# Patient Record
Sex: FEMALE | Race: BLACK OR AFRICAN AMERICAN | NOT HISPANIC OR LATINO | Employment: FULL TIME | URBAN - METROPOLITAN AREA
[De-identification: names, ages, dates, MRNs, and addresses within clinical notes are randomized per-mention and may not be internally consistent; named-entity substitution may affect disease eponyms.]

---

## 2017-03-09 PROCEDURE — 99283 EMERGENCY DEPT VISIT LOW MDM: CPT

## 2017-03-09 PROCEDURE — 99283 EMERGENCY DEPT VISIT LOW MDM: CPT | Mod: ,,, | Performed by: EMERGENCY MEDICINE

## 2017-03-10 ENCOUNTER — HOSPITAL ENCOUNTER (EMERGENCY)
Facility: HOSPITAL | Age: 57
Discharge: HOME | End: 2017-03-10
Attending: EMERGENCY MEDICINE
Payer: COMMERCIAL

## 2017-03-10 VITALS
WEIGHT: 184 LBS | OXYGEN SATURATION: 100 % | RESPIRATION RATE: 18 BRPM | HEART RATE: 86 BPM | TEMPERATURE: 99 F | BODY MASS INDEX: 36.12 KG/M2 | HEIGHT: 60 IN | DIASTOLIC BLOOD PRESSURE: 77 MMHG | SYSTOLIC BLOOD PRESSURE: 152 MMHG

## 2017-03-10 DIAGNOSIS — G44.219 EPISODIC TENSION-TYPE HEADACHE, NOT INTRACTABLE: ICD-10-CM

## 2017-03-10 DIAGNOSIS — R03.0 ELEVATED BLOOD PRESSURE READING: Primary | ICD-10-CM

## 2017-03-10 NOTE — DISCHARGE INSTRUCTIONS
Tension Headache    A muscle tension headache is a very common cause of head pain. Its also called a stress headache. When some people are under stress, they tense the muscles of their shoulder, neck, and scalp without knowing it. If this tension lasts long enough, a headache can occur. A tension headache can be quite painful. It can last for hours or even days.  Home care  Follow these tips when caring for yourself at home:  · Dont drive yourself home if you were given pain medicine for your headache. Instead, have someone else drive you home. Try to sleep when you get home. You should feel much better when you wake up.  · Put heat on the back of your neck to help ease neck spasm.  · Drink only clear liquids or eat a light diet until your symptoms get better. This will help you avoid nausea or vomiting.  How to prevent headaches  · Figure out what is causing stress in your life. Learn new ways to handle your stress. Ideas include regular exercise, biofeedback, self-hypnosis, yoga, and meditation. Talk with your healthcare provider to find out more information about managing stress. Many books and digital media are also available on this subject.  · Take time out at the first sign of a tension headache, if possible. Take yourself out of the stressful situation. Find a quiet, comfortable place to sit or lie down and let yourself relax. Heat and deep massage of the tight areas in the neck and shoulders may help ease muscle spasm. You may also get relief from a medicine like ibuprofen or a prescribed muscle relaxant.  Follow-up care  Follow up with your healthcare provider, or as advised. Talk with your provider if you have frequent headaches. He or she can figure out a treatment plan. Ask if you can have medicine to take at home the next time you get a bad headache. This may keep you from having to visit the emergency department in the future. You may need to see a headache specialist (neurologist) if you continue  to have headaches.  When to seek medical advice  Call your healthcare provider right away if any of these occur:  · Your head pain gets worse during sexual intercourse or strenuous activity  · Your head pain doesnt get better within 24 hours  · You arent able to keep liquids down (repeated vomiting)  · Fever of 100.4ºF (38ºC) or higher, or as directed by your healthcare provider  · Stiff neck  · Extreme drowsiness, confusion, or fainting  · Dizziness or dizziness with spinning sensation (vertigo)  · Weakness in an arm or leg or one side of your face  · You have difficulty speaking  · Your vision changes  Date Last Reviewed: 8/1/2016  © 6320-7679 [employer]. [address] [place] [address], [place] [address]. All rights reserved. This information is not intended as a substitute for professional medical care. Always follow your healthcare professional's instructions.

## 2017-03-10 NOTE — ED TRIAGE NOTES
"Pt presents to ed c/o " head pressure" and htn at home. She reports bp at home of 155/122 and 187/87. She denies h/a, n/v/d, dizziness, and fatigue.    "

## 2017-03-10 NOTE — ED AVS SNAPSHOT
[facility]  [address]  [facility] [address]               Jyoti Blue   3/10/2017 12:29 AM   ED    Description:  Female : 1960   Department:  [facility]           Your Care was Coordinated By:     Provider Role From To    Olaf Gonzalez III, MD Attending Provider 03/10/17 0041 --      Reason for Visit     Hypertension           Diagnoses this Visit        Comments    Elevated blood pressure reading    -  Primary     Episodic tension-type headache, not intractable           ED Disposition     None           To Do List           Follow-up Information     Follow up with Hawa Sullivan MD. Schedule an appointment as soon as possible for a visit in 3 days.    Specialty:  Internal Medicine    Contact information:    [address]  [facility] [address]  [phone]        [facility]     [facility] Nurse Care Line -  Assistance  Registered nurses in the [facility] provide clinical advisement, health education, appointment booking, and other advisory services.  Call for this free service at [phone].             Medications           Message regarding Medications     Verify the changes and/or additions to your medication regime listed below are the same as discussed with your clinician today.  If any of these changes or additions are incorrect, please notify your healthcare provider.             Verify that the below list of medications is an accurate representation of the medications you are currently taking.  If none reported, the list may be blank. If incorrect, please contact your healthcare provider. Carry this list with you in case of emergency.           Current Medications     pravastatin (PRAVACHOL) 40 MG tablet Take 1 tablet (40 mg total) by mouth once daily.    tramadol (ULTRAM) 50 mg tablet Take 1 tablet (50 mg total) by mouth daily as needed for Pain.           Clinical Reference  Information           Your Vitals Were     BP Pulse Temp Resp Height Weight    186/80 74 98.2 °F (36.8 °C) (Oral) 18 5' (1.524 m) 83.5 kg (184 lb)    SpO2 BMI             100% 35.94 kg/m2         Allergies as of 3/10/2017     No Known Allergies      Immunizations Administered on Date of Encounter - 3/10/2017     None      ED Micro, Lab, POCT     None      ED Imaging Orders     None        Discharge Instructions         Tension Headache    A muscle tension headache is a very common cause of head pain. Its also called a stress headache. When some people are under stress, they tense the muscles of their shoulder, neck, and scalp without knowing it. If this tension lasts long enough, a headache can occur. A tension headache can be quite painful. It can last for hours or even days.  Home care  Follow these tips when caring for yourself at home:  · Dont drive yourself home if you were given pain medicine for your headache. Instead, have someone else drive you home. Try to sleep when you get home. You should feel much better when you wake up.  · Put heat on the back of your neck to help ease neck spasm.  · Drink only clear liquids or eat a light diet until your symptoms get better. This will help you avoid nausea or vomiting.  How to prevent headaches  · Figure out what is causing stress in your life. Learn new ways to handle your stress. Ideas include regular exercise, biofeedback, self-hypnosis, yoga, and meditation. Talk with your healthcare provider to find out more information about managing stress. Many books and digital media are also available on this subject.  · Take time out at the first sign of a tension headache, if possible. Take yourself out of the stressful situation. Find a quiet, comfortable place to sit or lie down and let yourself relax. Heat and deep massage of the tight areas in the neck and shoulders may help ease muscle spasm. You may also get relief from a medicine like ibuprofen or a prescribed  muscle relaxant.  Follow-up care  Follow up with your healthcare provider, or as advised. Talk with your provider if you have frequent headaches. He or she can figure out a treatment plan. Ask if you can have medicine to take at home the next time you get a bad headache. This may keep you from having to visit the emergency department in the future. You may need to see a headache specialist (neurologist) if you continue to have headaches.  When to seek medical advice  Call your healthcare provider right away if any of these occur:  · Your head pain gets worse during sexual intercourse or strenuous activity  · Your head pain doesnt get better within 24 hours  · You arent able to keep liquids down (repeated vomiting)  · Fever of 100.4ºF (38ºC) or higher, or as directed by your healthcare provider  · Stiff neck  · Extreme drowsiness, confusion, or fainting  · Dizziness or dizziness with spinning sensation (vertigo)  · Weakness in an arm or leg or one side of your face  · You have difficulty speaking  · Your vision changes  Date Last Reviewed: 8/1/2016  © 8544-6971 [employer]. [address] [place] [address]. All rights reserved. This information is not intended as a substitute for professional medical care. Always follow your healthcare professional's instructions.          Discharge References/Attachments     HIGH BLOOD PRESSURE (HYPERTENSION), DISCHARGE INSTRUCTIONS (ENGLISH)      [facility] Sign-Up     Activating your [facility] account is as easy as 1-2-3!     1) Visit [email or web address], select Sign Up Now, enter this activation code and your date of birth, then select Next.  [email or web address]  Expires: 4/24/2017 12:57 AM      2) Create a username and password to use when you visit [facility] in the future and select a security question in case you lose your password and select Next.    3) Enter your e-mail address and click Sign Up!    Additional Information  If you have questions, please  e-mail [email or web address] or call [phone] to talk to our [facility] staff. Remember, [facility] is NOT to be used for urgent needs. For medical emergencies, dial 911.          [facility] complies with applicable Federal civil rights laws and does not discriminate on the basis of race, color, national origin, age, disability, or sex.        Language Assistance Services     ATTENTION: Language assistance services are available, free of charge. Please call [phone].      ATENCIÓN: Si habla español, tiene a easley disposición servicios gratuitos de asistencia lingüística. Llame al [phone].     CHÚ Ý: N?u b?n nói Ti?ng Vi?t, có các d?ch v? h? tr? ngôn ng? mi?n phí dành cho b?n. G?i s? [phone].

## 2017-03-10 NOTE — ED PROVIDER NOTES
"Encounter Date: 3/9/2017    SCRIBE #1 NOTE: I, Karolinatessa Yanez, am scribing for, and in the presence of, Dr. Gonzalez.       History     Chief Complaint   Patient presents with    Hypertension     pt states taking BP at home and there were 3 numbers on top and bottom, denies HTN, CP, SOB     Review of patient's allergies indicates:  No Known Allergies  HPI Comments: Time seen by provider: 12:49 AM    This is a 56 y.o. female with a PMHx of HLD, anemia, and allergy and a PSHx of breast surgery and ovary surgery who presents with complaint of hypertension with a mild frontal headache earlier today (now resolved). She states she recorded her blood pressure to be around 155/122, whereas usually it is around 120/88. On arrival to ED, patient's blood pressure was 186/80. She is not on blood pressure medication. Patient notes she ate crawfish and gumbo recently. Patient denies visual changes, chest pain, shortness of breath, extremity weakness, dysuria. She additionally denies a history of HTN.    The history is provided by the patient.     Past Medical History:   Diagnosis Date    Allergy     Anemia     Hyperlipidemia      Past Surgical History:   Procedure Laterality Date    BREAST SURGERY      DILATION AND CURETTAGE OF UTERUS      ovary removed      OVARY SURGERY       Family History   Problem Relation Age of Onset    Stroke Mother     Heart disease Father     Acne Neg Hx     Eczema Neg Hx     Psoriasis Neg Hx      Social History   Substance Use Topics    Smoking status: Former Smoker    Smokeless tobacco: Never Used      Comment: quit smoking in 1980s    Alcohol use 0.0 oz/week     0 Standard drinks or equivalent per week      Comment: "every now and then"     Review of Systems   Eyes: Negative for visual disturbance.   Respiratory: Negative for shortness of breath.    Cardiovascular: Negative for chest pain.   Genitourinary: Negative for dysuria.   Neurological: Positive for headaches (resolved). Negative " for weakness.       Physical Exam   Initial Vitals   BP Pulse Resp Temp SpO2   03/09/17 2051 03/09/17 2051 03/09/17 2051 03/09/17 2051 03/09/17 2051   186/80 74 18 98.2 °F (36.8 °C) 100 %     Physical Exam    Nursing note and vitals reviewed.  Constitutional: She appears well-developed and well-nourished.   Cardiovascular: Normal rate and regular rhythm. Exam reveals no gallop and no friction rub.    No murmur heard.  Pulmonary/Chest: Breath sounds normal. No respiratory distress. She has no wheezes. She has no rhonchi. She has no rales.   Abdominal: Soft. She exhibits no distension. There is no tenderness. There is no rebound and no guarding.   Neurological: She is alert and oriented to person, place, and time. She has normal strength. No cranial nerve deficit or sensory deficit.         ED Course   Procedures  Labs Reviewed - No data to display          Medical Decision Making:   History:   Old Medical Records: I decided to obtain old medical records.  Initial Assessment:   Patient with a very mild frontal headache that sounds like a tension headache that has completely resolved. Patient found to be hypertensive at home. Her usual blood pressures are completely normal. I will discharge home with no intervention and monitoring of blood pressure. She will follow up with her primary care.                    ED Course     Clinical Impression:   The primary encounter diagnosis was Elevated blood pressure reading. A diagnosis of Episodic tension-type headache, not intractable was also pertinent to this visit.    Disposition:   Disposition: Discharged  Condition: Stable       Olaf Gonzalez III, MD  03/10/17 0149

## 2017-04-24 ENCOUNTER — LAB VISIT (OUTPATIENT)
Dept: LAB | Facility: HOSPITAL | Age: 57
End: 2017-04-24
Attending: FAMILY MEDICINE
Payer: COMMERCIAL

## 2017-04-24 ENCOUNTER — TELEPHONE (OUTPATIENT)
Dept: INTERNAL MEDICINE | Facility: CLINIC | Age: 57
End: 2017-04-24

## 2017-04-24 ENCOUNTER — OFFICE VISIT (OUTPATIENT)
Dept: INTERNAL MEDICINE | Facility: CLINIC | Age: 57
End: 2017-04-24
Payer: COMMERCIAL

## 2017-04-24 VITALS
DIASTOLIC BLOOD PRESSURE: 66 MMHG | BODY MASS INDEX: 37.44 KG/M2 | WEIGHT: 190.69 LBS | HEIGHT: 60 IN | OXYGEN SATURATION: 99 % | HEART RATE: 61 BPM | SYSTOLIC BLOOD PRESSURE: 124 MMHG

## 2017-04-24 DIAGNOSIS — M17.11 OSTEOARTHRITIS OF RIGHT KNEE, UNSPECIFIED OSTEOARTHRITIS TYPE: ICD-10-CM

## 2017-04-24 DIAGNOSIS — R73.03 PREDIABETES: ICD-10-CM

## 2017-04-24 DIAGNOSIS — Z13.21 ENCOUNTER FOR VITAMIN DEFICIENCY SCREENING: ICD-10-CM

## 2017-04-24 DIAGNOSIS — Z00.00 ANNUAL PHYSICAL EXAM: Primary | ICD-10-CM

## 2017-04-24 DIAGNOSIS — H61.23 BILATERAL IMPACTED CERUMEN: ICD-10-CM

## 2017-04-24 DIAGNOSIS — E78.5 HYPERLIPIDEMIA, UNSPECIFIED HYPERLIPIDEMIA TYPE: ICD-10-CM

## 2017-04-24 DIAGNOSIS — Z00.00 ANNUAL PHYSICAL EXAM: ICD-10-CM

## 2017-04-24 LAB
25(OH)D3+25(OH)D2 SERPL-MCNC: 12 NG/ML
ALBUMIN SERPL BCP-MCNC: 3.8 G/DL
ALP SERPL-CCNC: 87 U/L
ALT SERPL W/O P-5'-P-CCNC: 16 U/L
ANION GAP SERPL CALC-SCNC: 10 MMOL/L
AST SERPL-CCNC: 17 U/L
BASOPHILS # BLD AUTO: 0.02 K/UL
BASOPHILS NFR BLD: 0.5 %
BILIRUB SERPL-MCNC: 0.3 MG/DL
BUN SERPL-MCNC: 14 MG/DL
CALCIUM SERPL-MCNC: 9.6 MG/DL
CHLORIDE SERPL-SCNC: 107 MMOL/L
CHOLEST/HDLC SERPL: 3.7 {RATIO}
CO2 SERPL-SCNC: 24 MMOL/L
CREAT SERPL-MCNC: 0.7 MG/DL
DIFFERENTIAL METHOD: ABNORMAL
EOSINOPHIL # BLD AUTO: 0.1 K/UL
EOSINOPHIL NFR BLD: 1.4 %
ERYTHROCYTE [DISTWIDTH] IN BLOOD BY AUTOMATED COUNT: 15.2 %
EST. GFR  (AFRICAN AMERICAN): >60 ML/MIN/1.73 M^2
EST. GFR  (NON AFRICAN AMERICAN): >60 ML/MIN/1.73 M^2
GLUCOSE SERPL-MCNC: 94 MG/DL
HCT VFR BLD AUTO: 40.2 %
HDL/CHOLESTEROL RATIO: 26.9 %
HDLC SERPL-MCNC: 227 MG/DL
HDLC SERPL-MCNC: 61 MG/DL
HGB BLD-MCNC: 13.1 G/DL
LDLC SERPL CALC-MCNC: 154.4 MG/DL
LYMPHOCYTES # BLD AUTO: 1.5 K/UL
LYMPHOCYTES NFR BLD: 34.5 %
MCH RBC QN AUTO: 28.3 PG
MCHC RBC AUTO-ENTMCNC: 32.6 %
MCV RBC AUTO: 87 FL
MONOCYTES # BLD AUTO: 0.3 K/UL
MONOCYTES NFR BLD: 7.9 %
NEUTROPHILS # BLD AUTO: 2.4 K/UL
NEUTROPHILS NFR BLD: 55.7 %
NONHDLC SERPL-MCNC: 166 MG/DL
PLATELET # BLD AUTO: 273 K/UL
PMV BLD AUTO: 10.6 FL
POTASSIUM SERPL-SCNC: 4.6 MMOL/L
PROT SERPL-MCNC: 7.7 G/DL
RBC # BLD AUTO: 4.63 M/UL
SODIUM SERPL-SCNC: 141 MMOL/L
TRIGL SERPL-MCNC: 58 MG/DL
TSH SERPL DL<=0.005 MIU/L-ACNC: 0.97 UIU/ML
WBC # BLD AUTO: 4.32 K/UL

## 2017-04-24 PROCEDURE — 82306 VITAMIN D 25 HYDROXY: CPT

## 2017-04-24 PROCEDURE — 84443 ASSAY THYROID STIM HORMONE: CPT

## 2017-04-24 PROCEDURE — 80053 COMPREHEN METABOLIC PANEL: CPT

## 2017-04-24 PROCEDURE — 36415 COLL VENOUS BLD VENIPUNCTURE: CPT

## 2017-04-24 PROCEDURE — 83036 HEMOGLOBIN GLYCOSYLATED A1C: CPT

## 2017-04-24 PROCEDURE — 80061 LIPID PANEL: CPT

## 2017-04-24 PROCEDURE — 85025 COMPLETE CBC W/AUTO DIFF WBC: CPT

## 2017-04-24 PROCEDURE — 99396 PREV VISIT EST AGE 40-64: CPT | Mod: S$GLB,,, | Performed by: FAMILY MEDICINE

## 2017-04-24 PROCEDURE — 99999 PR PBB SHADOW E&M-EST. PATIENT-LVL IV: CPT | Mod: PBBFAC,,, | Performed by: FAMILY MEDICINE

## 2017-04-24 RX ORDER — CYCLOBENZAPRINE HCL 10 MG
TABLET ORAL
COMMUNITY
Start: 2017-01-21 | End: 2020-08-12 | Stop reason: SDUPTHER

## 2017-04-24 RX ORDER — MELOXICAM 7.5 MG/1
7.5 TABLET ORAL DAILY
Qty: 30 TABLET | Refills: 0 | Status: SHIPPED | OUTPATIENT
Start: 2017-04-24 | End: 2017-04-24 | Stop reason: SDUPTHER

## 2017-04-24 RX ORDER — PRAVASTATIN SODIUM 40 MG/1
40 TABLET ORAL DAILY
Qty: 90 TABLET | Refills: 3 | Status: SHIPPED | OUTPATIENT
Start: 2017-04-24 | End: 2018-06-12 | Stop reason: SDUPTHER

## 2017-04-24 RX ORDER — MELOXICAM 7.5 MG/1
TABLET ORAL
Qty: 90 TABLET | Refills: 0 | Status: SHIPPED | OUTPATIENT
Start: 2017-04-24 | End: 2019-09-03

## 2017-04-24 NOTE — MR AVS SNAPSHOT
[facility]  [address]  [place] [address]  Phone: [phone]  Fax: [phone]                  Jyoti Blue   2017 11:30 AM   Office Visit    Description:  Female : 1960   Provider:  Francie Yip MD   Department:  [facility]           Reason for Visit     Establish Care     Annual Exam           Diagnoses this Visit        Comments    Annual physical exam    -  Primary     Hyperlipidemia, unspecified hyperlipidemia type         Prediabetes         Encounter for vitamin deficiency screening         Osteoarthritis of right knee, unspecified osteoarthritis type         Bilateral impacted cerumen                To Do List           Goals (5 Years of Data)              Today    3/10/17    3/9/17    Blood Pressure < 140/90   124/66  124/66  124/66      Follow-Up and Disposition     Return in about 6 months (around 10/24/2017), or if symptoms worsen or fail to improve.       These Medications        Disp Refills Start End    pravastatin (PRAVACHOL) 40 MG tablet 90 tablet 3 2017     Take 1 tablet (40 mg total) by mouth once daily. - Oral    Pharmacy: [facility] [address] [place] [address] [place] [facility] Ph #: [phone]       Notes to Pharmacy: **Patient requests 90 days supply**    meloxicam (MOBIC) 7.5 MG tablet 30 tablet 0 2017     Take 1 tablet (7.5 mg total) by mouth once daily. - Oral    Ph[facility] [address] [facility] Ph #: [phone]         [facility]     [facility] Call Nurse Care Line -  Assistance  Unless otherwise directed by your provider, please contact [facility], our nurse care line that is available for  assistance.     Registered nurses in the [facility] provide: appointment scheduling, clinical advisement, health education, and other advisory  services.  Call: [phone] (toll free)               Medications           Message regarding Medications     Verify the changes and/or additions to your medication regime listed below are the same as discussed with your clinician today.  If any of these changes or additions are incorrect, please notify your healthcare provider.        START taking these NEW medications        Refills    meloxicam (MOBIC) 7.5 MG tablet 0    Sig: Take 1 tablet (7.5 mg total) by mouth once daily.    Class: Normal    Route: Oral      STOP taking these medications     tramadol (ULTRAM) 50 mg tablet Take 1 tablet (50 mg total) by mouth daily as needed for Pain.           Verify that the below list of medications is an accurate representation of the medications you are currently taking.  If none reported, the list may be blank. If incorrect, please contact your healthcare provider. Carry this list with you in case of emergency.           Current Medications     pravastatin (PRAVACHOL) 40 MG tablet Take 1 tablet (40 mg total) by mouth once daily.    cyclobenzaprine (FLEXERIL) 10 MG tablet     meloxicam (MOBIC) 7.5 MG tablet Take 1 tablet (7.5 mg total) by mouth once daily.           Clinical Reference Information           Your Vitals Were     BP Pulse Height Weight SpO2 BMI    124/66 61 5' (1.524 m) 86.5 kg (190 lb 11.2 oz) 99% 37.24 kg/m2      Blood Pressure          Most Recent Value    BP  124/66      Allergies as of 4/24/2017     No Known Allergies      Immunizations Administered on Date of Encounter - 4/24/2017     None      Orders Placed During Today's Visit      Normal Orders This Visit    Ambulatory Referral to ENT     Ambulatory referral to Nutrition Services     Ambulatory Referral to Optometry     Ambulatory Referral to Orthopedics     Future Labs/Procedures Expected by Expires    CBC auto differential  4/24/2017 6/23/2018    Comprehensive metabolic panel  4/24/2017 6/23/2018    Lipid panel  4/24/2017 6/23/2018    TSH   4/24/2017 6/23/2018    Vitamin D  4/24/2017 6/23/2018    Hemoglobin A1c  3/25/2018 (Approximate) 6/23/2018      [facility] Sign-Up     Activating your [facility] account is as easy as 1-2-3!     1) Visit [email or web address], select Sign Up Now, enter this activation code and your date of birth, then select Next.  [email or web address]  Expires: 6/8/2017 12:33 PM      2) Create a username and password to use when you visit [facility] in the future and select a security question in case you lose your password and select Next.    3) Enter your e-mail address and click Sign Up!    Additional Information  If you have questions, please e-mail [email or web address] or call [phone] to talk to our [facility] staff. Remember, [facility] is NOT to be used for urgent needs. For medical emergencies, dial 911.         Language Assistance Services     ATTENTION: Language assistance services are available, free of charge. Please call [phone].      ATENCIÓN: Si habla español, tiene a easley disposición servicios gratuitos de asistencia lingüística. Llame al [phone].     SAÚL Ý: N?u b?n nói Ti?ng Vi?t, có các d?ch v? h? tr? ngôn ng? mi?n phí dành cho b?n. G?i s? [phone].         [facility] complies with applicable Federal civil rights laws and does not discriminate on the basis of race, color, national origin, age, disability, or sex.

## 2017-04-24 NOTE — LETTER
April 27, 2017    Jyoti Kaylie Mirza [address]  [place] [address]             [facility]  [address]  [place] [address]  Phone: [phone]  Fax: [phone] Dear Mrs Blue:    Please give us a call at the office at your convenience. We have been unable to reach you with your results.     If you have any questions or concerns, please don't hesitate to call.    Sincerely,        Kristie Peterson

## 2017-04-24 NOTE — PROGRESS NOTES
Subjective:       Patient ID: Jyoti Blue is a 56 y.o. female.    Chief Complaint: Establish Care and Annual Exam    HPI 57 y/o female with HLPD, OA of R knee, is here to establish care.  She is doing well, denies she is not doing any dedicated exercise, she plans to restart walking, she does have a seated bike in storage.  Denies f/n/v, occasional indigestion takes prn Tums, denies d/constipation/cp/sob/urinary sx.  GYN: followed by Dr. Quita Bess, mmg and pelvic utd and normal  R knee pain: started hurting about 2014, has mild DJD on previous x-ray, she had one injection in the past, not done PT, she had been given tramadol in the past and was taking it 1-2 times a month, she reports she takes Ibuprofen or Aleve for mild flare ups, takes about 2 pills about 2-3 times a month, the pain in her knee is in the front, cannot describe the pain  OTC: aleve or ibuprofen  Colonoscopy done in 2011 was normal  Eye exam due  Dental utd  Vaccines: tdap due    Review of Systems   Constitutional: Negative for activity change, appetite change, fatigue and fever.   Respiratory: Negative for cough and shortness of breath.    Cardiovascular: Negative for chest pain, palpitations and leg swelling.   Gastrointestinal: Negative for constipation, diarrhea, nausea and vomiting.   Genitourinary: Negative for difficulty urinating and dysuria.   Skin: Negative for rash.   Neurological: Negative for dizziness and light-headedness.   Psychiatric/Behavioral: Negative for sleep disturbance.       Objective:      /66  Pulse 61  Ht 5' (1.524 m)  Wt 86.5 kg (190 lb 11.2 oz)  SpO2 99%  BMI 37.24 kg/m2  Physical Exam   Constitutional: She appears well-developed and well-nourished.   HENT:   Head: Normocephalic and atraumatic.   Mouth/Throat: No oropharyngeal exudate.   Neck: Normal range of motion. Neck supple. No thyromegaly present.   Cardiovascular: Normal rate, regular rhythm and normal heart sounds.    Pulmonary/Chest:  Effort normal and breath sounds normal. No respiratory distress.   Abdominal: Soft. Bowel sounds are normal. She exhibits distension.   Musculoskeletal: Normal range of motion. She exhibits no edema, tenderness or deformity.   Crepitus bilateral knees   Lymphadenopathy:     She has no cervical adenopathy.   Neurological: She is alert.   Skin: Skin is warm and dry.   Psychiatric: She has a normal mood and affect.   Nursing note and vitals reviewed.      Assessment:       1. Annual physical exam    2. Hyperlipidemia, unspecified hyperlipidemia type    3. Prediabetes    4. Encounter for vitamin deficiency screening    5. Osteoarthritis of right knee, unspecified osteoarthritis type    6. Bilateral impacted cerumen        Plan:   Jyoti was seen today for establish care and annual exam.    Diagnoses and all orders for this visit:    Annual physical exam  -     TSH; Future    Hyperlipidemia, unspecified hyperlipidemia type  -     pravastatin (PRAVACHOL) 40 MG tablet; Take 1 tablet (40 mg total) by mouth once daily.  -     CBC auto differential; Future  -     Comprehensive metabolic panel; Future  -     Lipid panel; Future  -     Ambulatory Referral to Optometry    Prediabetes  -     Ambulatory referral to Nutrition Services  -     Hemoglobin A1c; Future  -     Ambulatory Referral to Optometry    Encounter for vitamin deficiency screening  -     Vitamin D; Future    Osteoarthritis of right knee, unspecified osteoarthritis type  -     Ambulatory Referral to Orthopedics  -     meloxicam (MOBIC) 7.5 MG tablet; Take 1 tablet (7.5 mg total) by mouth once daily.    Bilateral impacted cerumen  -     Ambulatory Referral to ENT

## 2017-04-24 NOTE — LETTER
04/24/2017                 [facility]  [address]  [facility] [address]  Phone: [phone]  Fax: [phone]   04/24/2017    Patient: Jyoti Blue   YOB: 1960   Date of Visit: 4/24/2017       To Whom it May Concern:    Jyoti Blue was seen in my clinic on 4/24/2017, she completed labs and vaccines as well.     If you have any questions or concerns, please don't hesitate to call.    Sincerely,         Francie Yip MD

## 2017-04-25 LAB
ESTIMATED AVG GLUCOSE: 134 MG/DL
HBA1C MFR BLD HPLC: 6.3 %

## 2017-04-25 NOTE — TELEPHONE ENCOUNTER
Please let her know that her blood sugar is still in the prediabetic range.  At this point we could start a medication called metformin to prevent diabetes.  Also, I encouraged her to attend are prediabetes education class.  Her vitamin D level is deficient, I recommend over-the-counter vit D3 2000 international units daily.  Please verify if she has been taking her Pravachol daily, her cholesterol has increased a little from last year.  Find out if she had tried any other cholesterol meds.

## 2017-05-05 ENCOUNTER — HOSPITAL ENCOUNTER (OUTPATIENT)
Dept: RADIOLOGY | Facility: HOSPITAL | Age: 57
Discharge: HOME | End: 2017-05-05
Attending: NURSE PRACTITIONER
Payer: COMMERCIAL

## 2017-05-05 ENCOUNTER — NUTRITION (OUTPATIENT)
Dept: NUTRITION | Facility: CLINIC | Age: 57
End: 2017-05-05
Payer: COMMERCIAL

## 2017-05-05 ENCOUNTER — OFFICE VISIT (OUTPATIENT)
Dept: ORTHOPEDICS | Facility: CLINIC | Age: 57
End: 2017-05-05
Payer: COMMERCIAL

## 2017-05-05 ENCOUNTER — OFFICE VISIT (OUTPATIENT)
Dept: OPTOMETRY | Facility: CLINIC | Age: 57
End: 2017-05-05
Payer: COMMERCIAL

## 2017-05-05 VITALS — HEIGHT: 60 IN | WEIGHT: 192 LBS | BODY MASS INDEX: 37.69 KG/M2

## 2017-05-05 VITALS — HEIGHT: 60 IN | BODY MASS INDEX: 37.69 KG/M2 | WEIGHT: 192 LBS

## 2017-05-05 DIAGNOSIS — M25.561 CHRONIC PAIN OF RIGHT KNEE: Primary | ICD-10-CM

## 2017-05-05 DIAGNOSIS — M17.11 PRIMARY OSTEOARTHRITIS OF RIGHT KNEE: ICD-10-CM

## 2017-05-05 DIAGNOSIS — H52.4 BILATERAL PRESBYOPIA: ICD-10-CM

## 2017-05-05 DIAGNOSIS — R73.03 PREDIABETES: Primary | ICD-10-CM

## 2017-05-05 DIAGNOSIS — Z71.3 DIETARY COUNSELING: ICD-10-CM

## 2017-05-05 DIAGNOSIS — E78.5 HYPERLIPIDEMIA, UNSPECIFIED HYPERLIPIDEMIA TYPE: ICD-10-CM

## 2017-05-05 DIAGNOSIS — H26.9 CORTICAL CATARACT OF BOTH EYES: ICD-10-CM

## 2017-05-05 DIAGNOSIS — G89.29 CHRONIC PAIN OF RIGHT KNEE: Primary | ICD-10-CM

## 2017-05-05 DIAGNOSIS — M25.561 CHRONIC PAIN OF RIGHT KNEE: ICD-10-CM

## 2017-05-05 DIAGNOSIS — G89.29 CHRONIC PAIN OF RIGHT KNEE: ICD-10-CM

## 2017-05-05 DIAGNOSIS — E66.9 OBESITY (BMI 30-39.9): ICD-10-CM

## 2017-05-05 PROCEDURE — 1160F RVW MEDS BY RX/DR IN RCRD: CPT | Mod: S$GLB,,, | Performed by: NURSE PRACTITIONER

## 2017-05-05 PROCEDURE — 73564 X-RAY EXAM KNEE 4 OR MORE: CPT | Mod: TC,RT

## 2017-05-05 PROCEDURE — 73562 X-RAY EXAM OF KNEE 3: CPT | Mod: 26,59,LT, | Performed by: RADIOLOGY

## 2017-05-05 PROCEDURE — 99999 PR PBB SHADOW E&M-EST. PATIENT-LVL III: CPT | Mod: PBBFAC,,, | Performed by: NURSE PRACTITIONER

## 2017-05-05 PROCEDURE — 99999 PR PBB SHADOW E&M-EST. PATIENT-LVL II: CPT | Mod: 25,PBBFAC,, | Performed by: OPTOMETRIST

## 2017-05-05 PROCEDURE — 97802 MEDICAL NUTRITION INDIV IN: CPT | Mod: S$GLB,,, | Performed by: DIETITIAN, REGISTERED

## 2017-05-05 PROCEDURE — 73564 X-RAY EXAM KNEE 4 OR MORE: CPT | Mod: 26,RT,, | Performed by: RADIOLOGY

## 2017-05-05 PROCEDURE — 92015 DETERMINE REFRACTIVE STATE: CPT | Mod: S$GLB,,, | Performed by: OPTOMETRIST

## 2017-05-05 PROCEDURE — 92004 COMPRE OPH EXAM NEW PT 1/>: CPT | Mod: S$GLB,,, | Performed by: OPTOMETRIST

## 2017-05-05 PROCEDURE — 99213 OFFICE O/P EST LOW 20 MIN: CPT | Mod: S$GLB,,, | Performed by: NURSE PRACTITIONER

## 2017-05-05 PROCEDURE — 99999 PR PBB SHADOW E&M-EST. PATIENT-LVL II: CPT | Mod: PBBFAC,,,

## 2017-05-05 RX ORDER — HYALURONATE SODIUM 20 MG/2 ML
20 SYRINGE (ML) INTRAARTICULAR WEEKLY
Status: COMPLETED | OUTPATIENT
Start: 2017-05-15 | End: 2017-05-29

## 2017-05-05 NOTE — PATIENT INSTRUCTIONS
CATARACT    Symptoms and Signs:  A cataract starts out small, and at first has little effect on your vision. You may notice that your vision is blurred a little, like looking through a cloudy piece of glass or viewing an impressionist painting. A cataract may make light from the sun or a lamp seem too bright or glaring. Or you may notice when you drive at night that the oncoming headlights cause more glare than before. Colors may not appear as bright as they once did.  The type of cataract you have will affect exactly which symptoms you experience and how soon they will occur. When a nuclear cataract first develops it can bring about a temporary improvement in your near vision, called second sight. Unfortunately, the improved vision is short-lived and will disappear as the cataract worsens. Meanwhile, a sub-capsular cataract may not produce any symptoms until it's well-developed.    Causes:  No one knows for sure why the eye's lens changes as we age, forming cataracts. Researchers are gradually identifying factors that may cause cataracts - and information that may help to prevent them.  Many studies suggest that exposure to ultraviolet light is associated with cataracts, so eye care practitioners recommend wearing sunglasses and a wide-brimmed hat to lessen your exposure.  Other studies suggest people with diabetes are at risk for developing a cataract.   Some eye care practitioners believe that a diet high in antioxidants, such as beta-carotene (vitamin A), selenium and vitamins C and E, may forestall cataracts.  The most important of these is probably vitamin C; it might be helpful to supplement the diet with an extra Vitamin C tablet.  Meanwhile, eating a lot of salt may increase your risk.  Other risk factors include cigarette smoke, air pollution and heavy alcohol consumption.  We simply recommend that you be careful to use sunglasses and to take Vitamin C.    Treatment:  When symptoms begin to appear, we can  improve your vision for a while using new glasses, strong bifocals, magnification, appropriate lighting or other visual aids.  This is true in your case; your cataract does not impact your vision very much at this time. If you experience any of the symptoms we described you can return at any time. Otherwise it is fine to see you in 1 year.    Amanda Brower, OD

## 2017-05-05 NOTE — LETTER
May 5, 2017      Francie Yip MD  [address]  [place] [address]           [facility]  [address]  [facility] [address]  Phone: [phone]          Patient: Jyoti Blue   MR Number: [id number]   YOB: 1960   Date of Visit: 5/5/2017       Dear Dr. Francie Yip:    Thank you for referring Jyoti Blue to me for evaluation. Attached you will find relevant portions of my assessment and plan of care.    If you have questions, please do not hesitate to call me. I look forward to following Jyoti Blue along with you.    Sincerely,    Kesha Hendrix, NP    Enclosure  CC:  No Recipients    If you would like to receive this communication electronically, please contact [email or web address] or [phone] to request more information on [employer] Link access.    For providers and/or their staff who would like to refer a patient to [facility], please contact us through our one-stop-shop provider referral line, [facility], at [phone].    If you feel you have received this communication in error or would no longer like to receive these types of communications, please e-mail [email or web address]

## 2017-05-05 NOTE — PATIENT INSTRUCTIONS
1200 calorie, low fat, low cholesterol, high fiber diet.  Exercise 30 minutes per day, 3-5 days per week.

## 2017-05-05 NOTE — MR AVS SNAPSHOT
[facility]  [address]  [facility] [address]  Phone: [phone]                  Jyoti Blue   2017 8:40 AM   Office Visit    Description:  Female : 1960   Provider:  Amanda Brower OD   Department:  [facility]           Reason for Visit     Diabetic Eye Exam                To Do List           Future Appointments        Provider Department Dept Phone    2017 9:30 AM DIETITIAN, GENERAL INT MED [facility] Nutrition [phone]    2017 11:00 AM Kesha Hendrix NP [facility] Orthopedics [phone]    2017 1:00 PM AUDIOGRAM, AUDIO [facility] Audiology [phone]    2017 2:15 PM Donald May III, MD [facility] Otorhinolaryngology [phone]      Goals (5 Years of Data)              4/24/17    3/10/17    3/9/17    Blood Pressure < 140/90   124/66  124/66  124/66      Follow-Up and Disposition     Return in about 1 year (around 2018) for annual eye examination.      [facility]     [facility] On Call Nurse Care Line -  Assistance  Unless otherwise directed by your provider, please contact [facility], our nurse care line that is available for  assistance.     Registered nurses in the [facility] On Call Center provide: appointment scheduling, clinical advisement, health education, and other advisory services.  Call: [phone] (toll free)               Medications           Message regarding Medications     Verify the changes and/or additions to your medication regime listed below are the same as discussed with your clinician today.  If any of these changes or additions are incorrect, please notify your healthcare provider.             Verify that the below list of medications is an accurate representation of the medications you are currently taking.  If none reported, the list may be blank. If incorrect, please contact your healthcare provider. Carry this list with you in case of emergency.            Current Medications     cyclobenzaprine (FLEXERIL) 10 MG tablet     meloxicam (MOBIC) 7.5 MG tablet TAKE 1 TABLET(7.5 MG) BY MOUTH EVERY DAY    pravastatin (PRAVACHOL) 40 MG tablet Take 1 tablet (40 mg total) by mouth once daily.           Clinical Reference Information           Allergies as of 5/5/2017     No Known Allergies      Immunizations Administered on Date of Encounter - 5/5/2017     None      [facility] Sign-Up     Activating your [facility] account is as easy as 1-2-3!     1) Visit [email or web address], select Sign Up Now, enter this activation code and your date of birth, then select Next.  [email or web address]  Expires: 6/8/2017 12:33 PM      2) Create a username and password to use when you visit [facility] in the future and select a security question in case you lose your password and select Next.    3) Enter your e-mail address and click Sign Up!    Additional Information  If you have questions, please e-mail [email or web address] or call [phone] to talk to our [facility] staff. Remember, [facility] is NOT to be used for urgent needs. For medical emergencies, dial 911.         Instructions    CATARACT    Symptoms and Signs:  A cataract starts out small, and at first has little effect on your vision. You may notice that your vision is blurred a little, like looking through a cloudy piece of glass or viewing an impressionist painting. A cataract may make light from the sun or a lamp seem too bright or glaring. Or you may notice when you drive at night that the oncoming headlights cause more glare than before. Colors may not appear as bright as they once did.  The type of cataract you have will affect exactly which symptoms you experience and how soon they will occur. When a nuclear cataract first develops it can bring about a temporary improvement in your near vision, called second sight. Unfortunately, the improved vision is short-lived and will disappear as the cataract worsens. Meanwhile, a  sub-capsular cataract may not produce any symptoms until it's well-developed.    Causes:  No one knows for sure why the eye's lens changes as we age, forming cataracts. Researchers are gradually identifying factors that may cause cataracts - and information that may help to prevent them.  Many studies suggest that exposure to ultraviolet light is associated with cataracts, so eye care practitioners recommend wearing sunglasses and a wide-brimmed hat to lessen your exposure.  Other studies suggest people with diabetes are at risk for developing a cataract.   Some eye care practitioners believe that a diet high in antioxidants, such as beta-carotene (vitamin A), selenium and vitamins C and E, may forestall cataracts.  The most important of these is probably vitamin C; it might be helpful to supplement the diet with an extra Vitamin C tablet.  Meanwhile, eating a lot of salt may increase your risk.  Other risk factors include cigarette smoke, air pollution and heavy alcohol consumption.  We simply recommend that you be careful to use sunglasses and to take Vitamin C.    Treatment:  When symptoms begin to appear, we can improve your vision for a while using new glasses, strong bifocals, magnification, appropriate lighting or other visual aids.  This is true in your case; your cataract does not impact your vision very much at this time. If you experience any of the symptoms we described you can return at any time. Otherwise it is fine to see you in 1 year.    Amanda Brower OD        Language Assistance Services     ATTENTION: Language assistance services are available, free of charge. Please call [phone].      ATENCIÓN: Si habla yocastaañol, tiene a easley disposición servicios gratuitos de asistencia lingüística. Llame al [phone].     [facility] Ý: N?u b?n nói Ti?ng Vi?t, có các d?ch v? h? tr? ngôn ng? mi?n phí dành cho b?n. G?i s? [phone].         [facility] complies with applicable Federal civil  rights laws and does not discriminate on the basis of race, color, national origin, age, disability, or sex.

## 2017-05-05 NOTE — PROGRESS NOTES
Referring Physician:Francie Yip MD     Reason for visit:  Chief Complaint   Patient presents with    Hyperglycemia    Obesity    Hyperlipidemia    Nutrition Counseling        :1960     Allergies Reviewed  Meds Reviewed    Anthropometrics  Weight:87.1 kg (192 lb 0.3 oz)  Height:5' (1.524 m)  BMI:Body mass index is 37.5 kg/(m^2).   IBW:   45.5 kg    Meds:  Outpatient Medications Prior to Visit   Medication Sig Dispense Refill    cyclobenzaprine (FLEXERIL) 10 MG tablet       meloxicam (MOBIC) 7.5 MG tablet TAKE 1 TABLET(7.5 MG) BY MOUTH EVERY DAY 90 tablet 0    pravastatin (PRAVACHOL) 40 MG tablet Take 1 tablet (40 mg total) by mouth once daily. 90 tablet 3     No facility-administered medications prior to visit.          Labs:   HgbA1c  6.3   Chol  227       Estimated Nutrition Needs:   1365 Kcals/day( 30 kcal/kg IBW),    45 g protein( 1.0 g/kg IBW)     Diet Hx:   Note:  Pt seen by Optometry this am prior to this encounter, and had eyes dilated.  Pt states she follows regular diet; likes fried chicken, uses real butter/half and half.  Snacks:  Nuts, popcorn, avoids sweets.    Exercise:  None at this time; states she plans to resume walking regimen at [facility].      Breakfast:   Grits, sausage neva or oatmeal or Egg White Delight.  Decaf coffee with blue pack sweetener and Pet milk or half & half.  On weekends may drink fruit juice.  Lunch:   Fried chicken; [employer] Italian (salami, ham, pastrami) sandwich; tuna or crab salad; [employer] grilled chicken sandwich.  Lemon water or sweet tea or tea with blue pack sweetener.  Dinner:   Last night:  BBQ shrimp in butter with Slovenian bread slices.  Crystal Light.    Assessment:   Pt attentive and asked relevant questions about foods recommended & to avoid; sample meal plan and menus.  Pt states she will review handouts/information in more detail once her eyesight returns to normal after eye exam this morning, and will call me if she has any nutrition-related  questions/concerns at that time.    Nutrition Diagnosis:   Food- and nutrition-related knowledge deficit RT lack of prior exposure to information AEB dx Prediabetes/elevated cholesterol.    Recommendations:   1200 calorie, low fat, low cholesterol, high fiber diet.  Exercise 30 minutes per day, 3-5 days per week.  Handouts provided and reviewed:  Cardiac Nutrition Therapy; 1200 Calorie Sample 5-Day Menus; Weight Loss Tips; Label Reading Tips for Weight Management; Cooking Tips for Weight Management; Get Fit Shopping List; Eating Right for a Healthy Weight; Eat Fit Plan...Anytime/Anywhere; Health and Nutrition-Related Websites; Servings of Carbohydrates for Meal Planning; Walking Works; Building a Healthy Plate;  Heart Healthy Eating:  Shopping Tips and Label Reading Tips; Prediabetes Management Guide    Consultation Time:20 minutes.    Follow Up:  Pt provided with dietitian contact number and advised to call with questions or make future appointment if further intervention needed.

## 2017-05-05 NOTE — PROGRESS NOTES
SUBJECTIVE:     Chief Complaint & History of Present Illness:  Jyoti Blue is a 56 y.o. year old female here with a history of intermittent right knee pain which started years ago.  There is not a history of trauma.  The pain is located in the medial aspect of the knee.  The pain is described as achy.  There is not radiation.  There is not catching or locking.  Aggravating factors include rising after sitting and prolonged weight bearing.  Associated symptoms include crepitus sensation.  There is not numbness or tingling of the lower extremity.   Previous treatments include Mobic and steroid inj which have provided adequate relief.  There is not a history of previous injury or surgery to the knee.  The patient does not use an assistive device.    Review of patient's allergies indicates:  No Known Allergies      Current Outpatient Prescriptions   Medication Sig Dispense Refill    meloxicam (MOBIC) 7.5 MG tablet TAKE 1 TABLET(7.5 MG) BY MOUTH EVERY DAY 90 tablet 0    pravastatin (PRAVACHOL) 40 MG tablet Take 1 tablet (40 mg total) by mouth once daily. 90 tablet 3    cyclobenzaprine (FLEXERIL) 10 MG tablet        Current Facility-Administered Medications   Medication Dose Route Frequency Provider Last Rate Last Dose    [START ON 5/15/2017] EUFLEXXA 10 mg/mL(mw 2.4 -3.6 million) injection Syrg 20 mg  20 mg Intra-articular Weekly Kesha Hendrix NP           Past Medical History:   Diagnosis Date    Allergy     Anemia     Hyperlipidemia        Past Surgical History:   Procedure Laterality Date    BREAST SURGERY      L sided lumpectomy, was told it was benign    DILATION AND CURETTAGE OF UTERUS      ovary removed      OVARY SURGERY         Review of Systems:  Review of Systems   Constitution: Negative for chills, fever, weakness and malaise/fatigue.   Cardiovascular: Negative for chest pain.   Hematologic/Lymphatic: Negative for bleeding problem.   Skin: Negative for color change, flushing, poor  wound healing and rash.   Musculoskeletal: Positive for arthritis and joint pain. Negative for falls, gout, joint swelling, muscle cramps, muscle weakness, myalgias and stiffness.   Neurological: Negative for dizziness, light-headedness, loss of balance, numbness and paresthesias.   Psychiatric/Behavioral: Negative for altered mental status.         OBJECTIVE:     PHYSICAL EXAM:  Height: 5' (152.4 cm) Weight: 87.1 kg (192 lb 0.3 oz)  General    Nursing note reviewed.  Constitutional: She is oriented to person, place, and time. She appears well-developed and well-nourished. No distress.   HENT:   Head: Atraumatic.   Nose: Nose normal.   Eyes: Conjunctivae and EOM are normal. Right eye exhibits no discharge. Left eye exhibits no discharge.   Pulmonary/Chest: Effort normal. No respiratory distress.   Neurological: She is alert and oriented to person, place, and time.   Psychiatric: She has a normal mood and affect. Her behavior is normal. Judgment and thought content normal.           Right Knee Exam     Inspection   Erythema: absent  Swelling: absent  Effusion: effusion  Deformity: deformity  Bruising: absent    Tenderness   The patient is experiencing no tenderness.         Crepitus   The patient has crepitus of the patella.    Range of Motion   Extension: normal   Flexion: normal     Other   Meniscal Cyst: absent  Popliteal (Baker's) Cyst: absent  Sensation: normal    Muscle Strength   Right Lower Extremity   Quadriceps:  5/5   Hamstrin/5     Vascular Exam     Right Pulses    Posterior Tibial:      2+        Edema  Right Lower Leg: absent     RADIOGRAPHS:  Xray obtained of the right knee and reviewed by myself shows moderate DJD, medial joint space with osteophyte formation. No fracture or dislocation.     ASSESSMENT/PLAN:     Plan: We discussed with the patient at length all the different treatment options available for arthrosis of the knee including anti-inflammatories, acetaminophen, rest, ice, knee  strengthening exercise, occasional cortisone injections for temporary relief, Viscosupplimentation injections, arthroscopic debridement osteotomy, and finally knee arthroplasty. I discussed with the risks vs benefits of treatment options as well as explained to the patient knee anatomy and physiology. She states she doesn't like to take medicine, so she only does Mobic if she really needs it. She states she was told by her PCP about a gel injection. I discussed Euflexxa with her and she wishes to try this.     Instructed pt to call office if they have any future questions/concerns or to schedule apt. She will return for the 1st Euflexxa injection.

## 2017-05-05 NOTE — PROGRESS NOTES
HPI     Diabetic Eye Exam    Additional comments: pre diabetic            Comments   Ms. Jyoti Blue was referred by Francie Yip MD for   prediabetes and hyperlipidemia.  Patient states no complaints about eyes, vision, or +1.50 to +2.00 OTC   reading glasses today. She requests refraction today.    (-)drops  (-)flashes  (-)floaters  (-)diplopia    Diabetic no pre diabetic   Hemoglobin A1C       Date                     Value               Ref Range             Status                04/24/2017               6.3 (H)             4.5 - 6.2 %           Final                 08/08/2016               6.2                 4.5 - 6.2 %           Final                 12/05/2015               6.2                 4.5 - 6.2 %           Final              OCULAR HISTORY  Last Eye Exam over 10 years ago   (-)eye surgery   (-)diagnosed or treated for any eye conditions or diseases none     FAMILY HISTORY  (-)Glaucoma none          Last edited by Amanda Brower, OD on 5/5/2017  9:31 AM. (History)            Assessment /Plan     For exam results, see Encounter Report.    Prediabetes  Hyperlipidemia, unspecified hyperlipidemia type   No ocular manifestations. Monitor yearly.    Cortical cataract of both eyes   Asymptomatic. Not visually significant OU. Monitor.      Bilateral presbyopia   New glasses prescription released, adaptation expected.  Distance glasses optional. Okay to continue OTC readers or order prescription reading glasses. Discussed lens options.  Eyeglass Final Rx     Eyeglass Final Rx      Sphere Cylinder Axis Add   Right -0.25 Sphere  +2.00   Left -0.50 +1.00 070 +2.00       Expiration Date:  5/6/2018                 RTC 1 year

## 2017-05-15 ENCOUNTER — OFFICE VISIT (OUTPATIENT)
Dept: ORTHOPEDICS | Facility: CLINIC | Age: 57
End: 2017-05-15
Payer: COMMERCIAL

## 2017-05-15 VITALS
BODY MASS INDEX: 37.62 KG/M2 | HEART RATE: 74 BPM | HEIGHT: 60 IN | WEIGHT: 191.63 LBS | DIASTOLIC BLOOD PRESSURE: 70 MMHG | SYSTOLIC BLOOD PRESSURE: 133 MMHG

## 2017-05-15 DIAGNOSIS — G89.29 CHRONIC PAIN OF RIGHT KNEE: Primary | ICD-10-CM

## 2017-05-15 DIAGNOSIS — M17.11 PRIMARY OSTEOARTHRITIS OF RIGHT KNEE: ICD-10-CM

## 2017-05-15 DIAGNOSIS — M25.561 CHRONIC PAIN OF RIGHT KNEE: Primary | ICD-10-CM

## 2017-05-15 PROCEDURE — 99499 UNLISTED E&M SERVICE: CPT | Mod: S$GLB,,, | Performed by: NURSE PRACTITIONER

## 2017-05-15 PROCEDURE — 99999 PR PBB SHADOW E&M-EST. PATIENT-LVL III: CPT | Mod: PBBFAC,,, | Performed by: NURSE PRACTITIONER

## 2017-05-15 PROCEDURE — 20610 DRAIN/INJ JOINT/BURSA W/O US: CPT | Mod: RT,S$GLB,, | Performed by: NURSE PRACTITIONER

## 2017-05-15 RX ADMIN — Medication 20 MG: at 03:05

## 2017-05-15 NOTE — PROGRESS NOTES
Jyoti Blue is a 55 yo female who is known to me in clinic. She has right knee DJD and chronic right knee pain. She is present today for Euflexxa infection #1. She has tried steroid previously. She denies any changes in medical status from last visit. She denies any questions or concerns.     PROCEDURE:  I have explained the risks, benefits, and alternatives of the procedure in detail.  The patient voices understanding and all questions have been answered. Allergies, Medications, past medical and surgical history were reviewed .    Examination shows no fever, malaise, erythema, edema, warmth or ecchymosis.   The patient agrees to proceed as planned. So after I performed a sterile prep of the skin in the normal fashion the right knee is injected using a 22 gauge needle from the anterolateral approach with 2cc of Euflexxa solution. The patient is reminded that it can take 6 - 8 weeks to see all the affects of this treatment, they must complete all three injections to see all the affects and the treatment can not be repeated any earlier than six months. Recommended pt rest knee for rest of today and should use ice to help alleviate flair up of pain. Patient verbalized understanding.    Instructed pt to call office if they have any future questions/concerns or to schedule apt. Patient will return to see next week for Euflexxa #2

## 2017-05-22 ENCOUNTER — OFFICE VISIT (OUTPATIENT)
Dept: ORTHOPEDICS | Facility: CLINIC | Age: 57
End: 2017-05-22
Payer: COMMERCIAL

## 2017-05-22 VITALS
WEIGHT: 191.69 LBS | BODY MASS INDEX: 37.63 KG/M2 | HEIGHT: 60 IN | SYSTOLIC BLOOD PRESSURE: 124 MMHG | HEART RATE: 78 BPM | DIASTOLIC BLOOD PRESSURE: 78 MMHG

## 2017-05-22 DIAGNOSIS — G89.29 CHRONIC PAIN OF RIGHT KNEE: Primary | ICD-10-CM

## 2017-05-22 DIAGNOSIS — M17.11 PRIMARY OSTEOARTHRITIS OF RIGHT KNEE: ICD-10-CM

## 2017-05-22 DIAGNOSIS — M25.561 CHRONIC PAIN OF RIGHT KNEE: Primary | ICD-10-CM

## 2017-05-22 PROCEDURE — 20610 DRAIN/INJ JOINT/BURSA W/O US: CPT | Mod: RT,S$GLB,, | Performed by: NURSE PRACTITIONER

## 2017-05-22 PROCEDURE — 99499 UNLISTED E&M SERVICE: CPT | Mod: S$GLB,,, | Performed by: NURSE PRACTITIONER

## 2017-05-22 PROCEDURE — 99999 PR PBB SHADOW E&M-EST. PATIENT-LVL III: CPT | Mod: PBBFAC,,, | Performed by: NURSE PRACTITIONER

## 2017-05-22 RX ADMIN — Medication 20 MG: at 09:05

## 2017-05-29 ENCOUNTER — OFFICE VISIT (OUTPATIENT)
Dept: ORTHOPEDICS | Facility: CLINIC | Age: 57
End: 2017-05-29
Payer: COMMERCIAL

## 2017-05-29 VITALS
BODY MASS INDEX: 37.66 KG/M2 | DIASTOLIC BLOOD PRESSURE: 70 MMHG | SYSTOLIC BLOOD PRESSURE: 131 MMHG | HEART RATE: 74 BPM | HEIGHT: 60 IN | WEIGHT: 191.81 LBS

## 2017-05-29 DIAGNOSIS — M25.561 CHRONIC PAIN OF RIGHT KNEE: ICD-10-CM

## 2017-05-29 DIAGNOSIS — G89.29 CHRONIC PAIN OF RIGHT KNEE: ICD-10-CM

## 2017-05-29 DIAGNOSIS — M17.11 PRIMARY OSTEOARTHRITIS OF RIGHT KNEE: Primary | ICD-10-CM

## 2017-05-29 PROCEDURE — 20610 DRAIN/INJ JOINT/BURSA W/O US: CPT | Mod: RT,S$GLB,, | Performed by: NURSE PRACTITIONER

## 2017-05-29 PROCEDURE — 99499 UNLISTED E&M SERVICE: CPT | Mod: S$GLB,,, | Performed by: NURSE PRACTITIONER

## 2017-05-29 PROCEDURE — 99999 PR PBB SHADOW E&M-EST. PATIENT-LVL III: CPT | Mod: PBBFAC,,, | Performed by: NURSE PRACTITIONER

## 2017-05-29 RX ADMIN — Medication 20 MG: at 03:05

## 2017-05-29 NOTE — PROGRESS NOTES
"Jyoti Blue is a 55 yo female who is known to me in clinic. She has right knee DJD and chronic right knee pain. She is present today for Euflexxa infection #2. She has tried steroid previously. She denies any changes in medical status from last visit. She denies any questions or concerns.  She states the right knee has no pain now. She states she still has joint crepitus. I discussed with her that this is not something that will go away, she says "maybe a need a screw in my knee", I discussed that this is not something to fix surgically. She verbalized understanding. I discussed that we want to relieve pain in the knee and that crepitus is normal with arthritic changes.      PROCEDURE:  I have explained the risks, benefits, and alternatives of the procedure in detail.  The patient voices understanding and all questions have been answered. Allergies, Medications, past medical and surgical history were reviewed .    Examination shows no fever, malaise, erythema, edema, warmth or ecchymosis.   The patient agrees to proceed as planned. So after I performed a sterile prep of the skin in the normal fashion the right knee is injected using a 22 gauge needle from the anterolateral approach with 2cc of Euflexxa solution. The patient is reminded that it can take 6 - 8 weeks to see all the affects of this treatment, they must complete all three injections to see all the affects and the treatment can not be repeated any earlier than six months. Recommended pt rest knee for rest of today and should use ice to help alleviate flair up of pain. Patient verbalized understanding.    Instructed pt to call office if they have any future questions/concerns or to schedule apt. Patient will return PRN.  "

## 2017-12-18 ENCOUNTER — OFFICE VISIT (OUTPATIENT)
Dept: INTERNAL MEDICINE | Facility: CLINIC | Age: 57
End: 2017-12-18
Payer: COMMERCIAL

## 2017-12-18 VITALS
BODY MASS INDEX: 44.49 KG/M2 | WEIGHT: 197.75 LBS | HEART RATE: 96 BPM | SYSTOLIC BLOOD PRESSURE: 120 MMHG | TEMPERATURE: 98 F | DIASTOLIC BLOOD PRESSURE: 78 MMHG | HEIGHT: 56 IN

## 2017-12-18 DIAGNOSIS — J40 BRONCHITIS: Primary | ICD-10-CM

## 2017-12-18 PROCEDURE — 99214 OFFICE O/P EST MOD 30 MIN: CPT | Mod: 25,S$GLB,, | Performed by: FAMILY MEDICINE

## 2017-12-18 PROCEDURE — 99999 PR PBB SHADOW E&M-EST. PATIENT-LVL III: CPT | Mod: PBBFAC,,, | Performed by: FAMILY MEDICINE

## 2017-12-18 PROCEDURE — 96372 THER/PROPH/DIAG INJ SC/IM: CPT | Mod: S$GLB,,, | Performed by: FAMILY MEDICINE

## 2017-12-18 RX ORDER — AMOXICILLIN AND CLAVULANATE POTASSIUM 875; 125 MG/1; MG/1
1 TABLET, FILM COATED ORAL EVERY 12 HOURS
Qty: 20 TABLET | Refills: 0 | Status: SHIPPED | OUTPATIENT
Start: 2017-12-18 | End: 2017-12-28

## 2017-12-18 RX ORDER — BENZONATATE 200 MG/1
200 CAPSULE ORAL 3 TIMES DAILY PRN
Qty: 30 CAPSULE | Refills: 0 | Status: SHIPPED | OUTPATIENT
Start: 2017-12-18 | End: 2017-12-28

## 2017-12-18 RX ORDER — TRIAMCINOLONE ACETONIDE 40 MG/ML
40 INJECTION, SUSPENSION INTRA-ARTICULAR; INTRAMUSCULAR
Status: COMPLETED | OUTPATIENT
Start: 2017-12-18 | End: 2017-12-18

## 2017-12-18 RX ORDER — ALBUTEROL SULFATE 90 UG/1
2 AEROSOL, METERED RESPIRATORY (INHALATION) EVERY 6 HOURS PRN
Qty: 18 G | Refills: 0 | Status: SHIPPED | OUTPATIENT
Start: 2017-12-18 | End: 2019-09-03

## 2017-12-18 RX ORDER — FLUTICASONE PROPIONATE 50 MCG
2 SPRAY, SUSPENSION (ML) NASAL DAILY
Qty: 16 G | Refills: 11 | Status: SHIPPED | OUTPATIENT
Start: 2017-12-18 | End: 2018-01-17

## 2017-12-18 RX ADMIN — TRIAMCINOLONE ACETONIDE 40 MG: 40 INJECTION, SUSPENSION INTRA-ARTICULAR; INTRAMUSCULAR at 04:12

## 2017-12-18 NOTE — PROGRESS NOTES
Subjective:       Patient ID: Jyoti Blue is a 57 y.o. female.    Chief Complaint: Nasal Congestion; Cough; Fever; Chest Congestion; and Generalized Body Aches    HPI 57-year-old -American female presents to clinic today accompanied by her daughter secondary to a complaint of subjective fever and chills, fatigue, nasal congestion, postnasal drip, runny nose, sinus pressure, sore throat, chest congestion, cough, nausea, generalized body aches, and headaches worsening since Saturday.  She has been using Allegra without relief.  Review of Systems   Constitutional: Positive for chills, fatigue and fever. Negative for appetite change.   HENT: Positive for congestion, postnasal drip, rhinorrhea, sinus pressure and sore throat. Negative for ear pain, hearing loss and tinnitus.    Eyes: Negative for redness, itching and visual disturbance.   Respiratory: Positive for cough and chest tightness. Negative for shortness of breath.    Cardiovascular: Negative for chest pain and palpitations.   Gastrointestinal: Positive for nausea. Negative for abdominal pain, constipation, diarrhea and vomiting.   Genitourinary: Negative for decreased urine volume, difficulty urinating, dysuria, frequency, hematuria and urgency.   Musculoskeletal: Positive for myalgias. Negative for back pain, neck pain and neck stiffness.   Skin: Negative for rash.   Neurological: Positive for headaches. Negative for dizziness and light-headedness.   Psychiatric/Behavioral: Negative.        Objective:      Physical Exam   Constitutional: She is oriented to person, place, and time. She appears well-developed and well-nourished. No distress.   HENT:   Head: Normocephalic and atraumatic.   Right Ear: External ear normal. A middle ear effusion is present.   Left Ear: External ear normal. A middle ear effusion is present.   Nose: Mucosal edema and rhinorrhea present.   Mouth/Throat: Oropharynx is clear and moist. No oropharyngeal exudate.   Eyes:  Conjunctivae and EOM are normal. Pupils are equal, round, and reactive to light. Right eye exhibits no discharge. Left eye exhibits no discharge. No scleral icterus.   Neck: Normal range of motion. Neck supple. No JVD present. No tracheal deviation present. No thyromegaly present.   Cardiovascular: Normal rate, regular rhythm, normal heart sounds and intact distal pulses.  Exam reveals no gallop and no friction rub.    No murmur heard.  Pulmonary/Chest: Effort normal. No stridor. No respiratory distress. She has wheezes. She has no rales.   Abdominal: Soft. Bowel sounds are normal. She exhibits no distension and no mass. There is no tenderness. There is no rebound and no guarding.   Musculoskeletal: Normal range of motion. She exhibits no edema or tenderness.   Lymphadenopathy:     She has no cervical adenopathy.   Neurological: She is alert and oriented to person, place, and time.   Skin: Skin is warm and dry. No rash noted. She is not diaphoretic. No erythema. No pallor.   Psychiatric: She has a normal mood and affect. Her behavior is normal. Judgment and thought content normal.   Nursing note and vitals reviewed.      Assessment:       1. Bronchitis        Plan:       Bronchitis  -     triamcinolone acetonide injection 40 mg; Inject 1 mL (40 mg total) into the muscle one time.  -     amoxicillin-clavulanate 875-125mg (AUGMENTIN) 875-125 mg per tablet; Take 1 tablet by mouth every 12 (twelve) hours.  Dispense: 20 tablet; Refill: 0  -     fluticasone (FLONASE) 50 mcg/actuation nasal spray; 2 sprays by Each Nare route once daily.  Dispense: 16 g; Refill: 11  -     benzonatate (TESSALON) 200 MG capsule; Take 1 capsule (200 mg total) by mouth 3 (three) times daily as needed for Cough.  Dispense: 30 capsule; Refill: 0  -     albuterol 90 mcg/actuation inhaler; Inhale 2 puffs into the lungs every 6 (six) hours as needed for Wheezing or Shortness of Breath. Rescue  Dispense: 18 g; Refill: 0     Tylenol and ibuprofen as  needed for fever or pain.  Saltwater or Listerine gargle as needed for sore throat.  Over-the-counter Claritin nightly.  Return to clinic as needed if symptoms persist or worsen.

## 2018-06-12 DIAGNOSIS — Z00.00 ANNUAL PHYSICAL EXAM: ICD-10-CM

## 2018-06-12 DIAGNOSIS — R73.03 PREDIABETES: ICD-10-CM

## 2018-06-12 DIAGNOSIS — E78.5 HYPERLIPIDEMIA, UNSPECIFIED HYPERLIPIDEMIA TYPE: ICD-10-CM

## 2018-06-13 DIAGNOSIS — E78.5 HYPERLIPIDEMIA, UNSPECIFIED HYPERLIPIDEMIA TYPE: ICD-10-CM

## 2018-06-13 RX ORDER — PRAVASTATIN SODIUM 40 MG/1
TABLET ORAL
Qty: 30 TABLET | Refills: 0 | Status: SHIPPED | OUTPATIENT
Start: 2018-06-13 | End: 2018-07-23 | Stop reason: SDUPTHER

## 2018-06-13 RX ORDER — PRAVASTATIN SODIUM 40 MG/1
TABLET ORAL
Qty: 30 TABLET | Refills: 0 | OUTPATIENT
Start: 2018-06-13

## 2018-07-10 ENCOUNTER — PATIENT OUTREACH (OUTPATIENT)
Dept: ADMINISTRATIVE | Facility: HOSPITAL | Age: 58
End: 2018-07-10

## 2018-07-10 NOTE — PROGRESS NOTES
Patient mailed a Release of Information to be signed in efforts to obtain medical records completed outside [facility]. Instructions provided to be signed and completed with information regarding where records are located.     Health Maintenance Due   Topic Date Due    Mammogram  07/13/2016    Pap Smear with HPV Cotest  08/27/2017

## 2018-07-21 ENCOUNTER — LAB VISIT (OUTPATIENT)
Dept: LAB | Facility: HOSPITAL | Age: 58
End: 2018-07-21
Attending: FAMILY MEDICINE
Payer: COMMERCIAL

## 2018-07-21 DIAGNOSIS — Z00.00 ANNUAL PHYSICAL EXAM: ICD-10-CM

## 2018-07-21 LAB
BILIRUB UR QL STRIP: NEGATIVE
CLARITY UR REFRACT.AUTO: CLEAR
COLOR UR AUTO: YELLOW
GLUCOSE UR QL STRIP: NEGATIVE
HGB UR QL STRIP: NEGATIVE
KETONES UR QL STRIP: NEGATIVE
LEUKOCYTE ESTERASE UR QL STRIP: NEGATIVE
NITRITE UR QL STRIP: NEGATIVE
PH UR STRIP: 5 [PH] (ref 5–8)
PROT UR QL STRIP: NEGATIVE
SP GR UR STRIP: 1.02 (ref 1–1.03)
URN SPEC COLLECT METH UR: NORMAL
UROBILINOGEN UR STRIP-ACNC: NEGATIVE EU/DL

## 2018-07-21 PROCEDURE — 81003 URINALYSIS AUTO W/O SCOPE: CPT

## 2018-07-23 ENCOUNTER — OFFICE VISIT (OUTPATIENT)
Dept: INTERNAL MEDICINE | Facility: CLINIC | Age: 58
End: 2018-07-23
Payer: COMMERCIAL

## 2018-07-23 ENCOUNTER — TELEPHONE (OUTPATIENT)
Dept: INTERNAL MEDICINE | Facility: CLINIC | Age: 58
End: 2018-07-23

## 2018-07-23 ENCOUNTER — OFFICE VISIT (OUTPATIENT)
Dept: OPTOMETRY | Facility: CLINIC | Age: 58
End: 2018-07-23
Payer: COMMERCIAL

## 2018-07-23 VITALS
DIASTOLIC BLOOD PRESSURE: 64 MMHG | WEIGHT: 195.31 LBS | HEIGHT: 56 IN | HEART RATE: 85 BPM | BODY MASS INDEX: 43.93 KG/M2 | SYSTOLIC BLOOD PRESSURE: 120 MMHG | OXYGEN SATURATION: 98 %

## 2018-07-23 DIAGNOSIS — Z00.00 ANNUAL PHYSICAL EXAM: Primary | ICD-10-CM

## 2018-07-23 DIAGNOSIS — Z78.0 POST-MENOPAUSAL: ICD-10-CM

## 2018-07-23 DIAGNOSIS — E78.5 HYPERLIPIDEMIA, UNSPECIFIED HYPERLIPIDEMIA TYPE: ICD-10-CM

## 2018-07-23 DIAGNOSIS — R73.03 PREDIABETES: ICD-10-CM

## 2018-07-23 DIAGNOSIS — H26.9 CORTICAL CATARACT OF BOTH EYES: ICD-10-CM

## 2018-07-23 DIAGNOSIS — H52.4 BILATERAL PRESBYOPIA: Primary | ICD-10-CM

## 2018-07-23 PROCEDURE — 92015 DETERMINE REFRACTIVE STATE: CPT | Mod: S$GLB,,, | Performed by: OPTOMETRIST

## 2018-07-23 PROCEDURE — 99999 PR PBB SHADOW E&M-EST. PATIENT-LVL III: CPT | Mod: PBBFAC,,, | Performed by: FAMILY MEDICINE

## 2018-07-23 PROCEDURE — 99999 PR PBB SHADOW E&M-EST. PATIENT-LVL II: CPT | Mod: PBBFAC,,, | Performed by: OPTOMETRIST

## 2018-07-23 PROCEDURE — 99396 PREV VISIT EST AGE 40-64: CPT | Mod: S$GLB,,, | Performed by: FAMILY MEDICINE

## 2018-07-23 PROCEDURE — 92014 COMPRE OPH EXAM EST PT 1/>: CPT | Mod: S$GLB,,, | Performed by: OPTOMETRIST

## 2018-07-23 RX ORDER — PRAVASTATIN SODIUM 40 MG/1
40 TABLET ORAL DAILY
Qty: 90 TABLET | Refills: 1 | Status: SHIPPED | OUTPATIENT
Start: 2018-07-23 | End: 2018-12-10 | Stop reason: SDUPTHER

## 2018-07-23 NOTE — PROGRESS NOTES
HPI     Ms. Jyoti Blue is here for a routine annual eye exam ([facility]).       She reports clear distance vision without correction, but near blur with   +1.25 OTC readers. She requests refraction today.    (-)drops  (-)flashes  (-)floaters  (-)diplopia    Diabetic: pre diabetic   Hemoglobin A1C       Date                     Value               Ref Range             Status           07/21/2018               5.9 (H)             4.0 - 5.6 %         Final  04/24/2017               6.3 (H)             4.5 - 6.2 %         Final  08/08/2016               6.2                 4.5 - 6.2 %         Final    OCULAR HISTORY  Last Eye Exam 05/05/17 with Dr. Brower  (-)eye surgery   Cataracts OU    FAMILY HISTORY  (-)Glaucoma none       Last edited by Amanda Brower, OD on 7/23/2018 11:39 AM. (History)            Assessment /Plan     For exam results, see Encounter Report.    Bilateral presbyopia   Minimal refractive error OU. Distance glasses optional. Okay to continue with OTC readers, but increase power.  Eyeglass Final Rx     Eyeglass Final Rx       Sphere Cylinder Axis Add    Right Haigler Sphere  +2.00    Left -0.25 +0.50 085 +2.00    Expiration Date:  7/24/2019            Cortical cataract of both eyes   Stable. Cause of mildly reduced best-corrected visual acuity OD and OS, but asymptomatic. Monitor.      RTC 1 year

## 2018-07-23 NOTE — PROGRESS NOTES
"Subjective:       Patient ID: Jyoti Blue is a 58 y.o. female.    Chief Complaint: Annual Exam    HPI  57 y/o female with HLPD, OA of R knee, is here for annual exam.  Her weight is up 5 pounds, reports she is not exercising, has changed her diet some.  Denies f/n/v, occasional indigestion takes prn Tums, denies d/constipation/cp/sob/urinary sx. Sleeping well.   GYN: followed by Dr. Quita Bess, mmg and pelvic due in september  OTC: aleve or ibuprofen  Colonoscopy done in 2011 was normal  Eye exam utd  Dental utd  Vaccines: utd  Labs reviewed with patient    Review of Systems   Constitutional: Negative for activity change, appetite change, fatigue and fever.   Respiratory: Negative for cough and shortness of breath.    Cardiovascular: Negative for chest pain, palpitations and leg swelling.   Gastrointestinal: Negative for constipation, diarrhea, nausea and vomiting.   Genitourinary: Negative for difficulty urinating and dysuria.   Skin: Negative for rash.   Neurological: Negative for dizziness and light-headedness.   Psychiatric/Behavioral: Negative for sleep disturbance.       Objective:      /64 (BP Location: Left arm, Patient Position: Sitting, BP Method: Large (Manual))   Pulse 85   Ht 4' 8" (1.422 m)   Wt 88.6 kg (195 lb 5.2 oz)   SpO2 98%   BMI 43.79 kg/m²   Physical Exam   Constitutional: She appears well-developed and well-nourished.   HENT:   Head: Normocephalic and atraumatic.   Mouth/Throat: No oropharyngeal exudate.   Neck: Normal range of motion. Neck supple. No thyromegaly present.   Cardiovascular: Normal rate, regular rhythm and normal heart sounds.    Pulmonary/Chest: Effort normal and breath sounds normal. No respiratory distress.   Abdominal: Soft. Bowel sounds are normal. She exhibits no distension. There is no tenderness.   Musculoskeletal: She exhibits no edema.   Lymphadenopathy:     She has no cervical adenopathy.   Neurological: She is alert.   Skin: Skin is warm and dry. "   Psychiatric: She has a normal mood and affect.   Nursing note and vitals reviewed.      Assessment:       1. Annual physical exam    2. Hyperlipidemia, unspecified hyperlipidemia type    3. Prediabetes    4. BMI 40.0-44.9, adult        Plan:   Jyoti was seen today for annual exam.    Diagnoses and all orders for this visit:    Annual physical exam    Hyperlipidemia, unspecified hyperlipidemia type  -     pravastatin (PRAVACHOL) 40 MG tablet; Take 1 tablet (40 mg total) by mouth once daily.  -     Lipid panel; Future    Prediabetes  -     Hemoglobin A1c; Future    BMI 40.0-44.9, adult    she has already done medical fitness, discussed health

## 2018-07-26 ENCOUNTER — TELEPHONE (OUTPATIENT)
Dept: INTERNAL MEDICINE | Facility: CLINIC | Age: 58
End: 2018-07-26

## 2018-07-26 NOTE — TELEPHONE ENCOUNTER
..Patient referred by Dr. Yip for Health coaching (weight loss, lifestyle changes).  Called patient and gave an explanation about Health Coaching program and invited participation.   Patient states she would like to participate.  Set appointment for 8/1/18 at 1530.   Gave direct contact number to call if he/ she has any questions [phone].   Nikole Cuellar RN  Health

## 2018-07-26 NOTE — TELEPHONE ENCOUNTER
----- Message from Francie Yip MD sent at 7/23/2018  1:10 PM CDT -----  Please help with weight loss goals.

## 2018-08-01 ENCOUNTER — CLINICAL SUPPORT (OUTPATIENT)
Dept: INTERNAL MEDICINE | Facility: CLINIC | Age: 58
End: 2018-08-01
Payer: COMMERCIAL

## 2018-08-01 VITALS — WEIGHT: 194.25 LBS | BODY MASS INDEX: 43.54 KG/M2

## 2018-08-01 NOTE — PROGRESS NOTES
Date:  8/1/18                    Time: 1530  Initial Health  Contact - [x]Clinic visit  []  Phone Visit  ASSEMENT: Information obtained through combination of chart review prior to seeing patient, patient self-report.   Primary Referral diagnosis/reason for referral:     Weight loss      (See physician progress note for complete list of diagnoses.)  PCP:   Dr. Yip     Referent :  [x] PCP       [] Transition Navigator    []  E.BRO    []  APRN  []  Other:     Supports:   Family       Preferred Learning Style  (may be a combination)  [x]  Visual (pictures/ video)     [x] Auditory/ Listening   [x]  Reading    [x]  Hands-on/ Demonstration   [x]  1:1    [x]  Group        []  Alone       []  No preference   []  Combination  []  Other:         Presenting issues from patients point of view:  [x]  Improve nutrition/increase healthy choices.                    [x]  Improve /maintain current functioning.  [x]  Obtain and maintain healthy blood pressure.                  []  Stop smoking.  [x]  Improve weight management.                                       [x]  Lower cholesterol.  [x]  Improve blood glucose management.                            [x]  Improve breathing.  [x]  Increase energy level.                                                      [x]  Increase/maintain independence.   []  Improve sleep.                                                                []  Decrease stress.  []  Improve pain management.                                             []  Improve mood.  []  Other:                  Pt. Education Level:      3 yr college    Disease specific education services attended:  no       Patient Employed:  [x]yes    [] No  Where [employer] _____________________  Days and Hours:      5 days 35 hours          Current Self-help Behaviors  []  Checks glucose level        times a day.  [x]  Tries to modify meals so more healthy.  []  Exercises by        [x]  Takes BP        times a       (insert  frequency)  [x]  Weighs self   2 x week      (how often)  [x]  Takes all medications as prescribed.  [x]  Rarely misses health care appointments.  [x]  Sleeps 8 hours without waking more than twice.  []  Consistently wears/uses functioning aids as recommended  (ie:  Contacts [] , glasses [] , hearing  Aid [] , prosthesis [] ,  Walker [] ,  Wheelchair []  etc.)  []  Regularly engages in stress management activities I.e.:  [x]  Quit smoking 1987 smoked for 7-8yrs  [x]  Purposefully educates self about health issues.  []  Pt did bring glucose log with him/her.  []  Other  (explain)           []  Comments:       []  Reported Blood Sugar Readings:          Health screenings   Last PCP visit:  18     GYN/Pap:     17                        MM-1-17                                                        Colon: 10-14-11   Lipids: 18   CMP: 18   HgA1C: 18            Eye Exam:  18     Strengths:  [x]  Confident                       [x]  Determined/persistent           [x]  Enthusiastic         [x]  Good problem solving           [x]  Good self-control                   [x]  Hard working        [x]  Hopeful/optimistic                  [x]  Intelligent                                [x]  Self aware  [x]  Creative                                 [x]  Flexible          [x]  Sense of humor                     [x]  Open/willing          [x]  Spiritual                                  [x] Values health    [x]  Successful overcoming difficult challenges in the past.     [x]  Pos support network  [x]   Health literate (The degree to which individuals have the capacity to obtain, process, and    understand basic health information and services needed to make appropriate health decisions.- Dept. of Health and Human services.)  []   Other Comments:             Change Markers  Scale 0-10 with 10 representing most Ready 0 least ready, 10 most important 0 least important 10 most confident 0  least confident.     [] NA at this time.   Readiness:   10  ;  Importance:    10 ;  Confidence:   10     INTERVENTIONS:  [x] Given an explanation about health coaching program and invited participation.  [x] Listened reflectively; provided support, encouragement, and validation; respected  and maintained patient self-direction; explored pros/cons of change; personalized health risks of maintaining the status quo; and facilitated recognition of discrepancy between current behaviors and patients goals and values.  [x] Assessed stage of change and employed appropriate motivational interviewing strategies to facilitate movement toward the next stage of change and healthy behavior modification.  [x] Normalized occurrence of relapse, helped patient recognize outcome of new self-learning as a result of the relapse such as triggers, and helped focus on factors to reduce chances of returning to previous behaviors .  [x] Used readiness scale ratings to guide assessment of importance and confidence of successfully reaching goals.  [x] Assisted patient to develop goal, action plan, and address potential barriers to goal     achievement.  [] Patient was given a new (insert glucose meter or other supplies), taught to use, and      successfully demonstrated ability to carry out essential steps of process.    [] Rx for ( monitor/supplies, pen needles, etc.) was obtained.  [x] Provided educational review, written materials/handouts (Meal Planning Counting Carbs, Healthy Plate, 10 Rules for Eating Safely for Life, 10 Tips to Cutting Your Cravings, Probiotics).   [x] Topics discussed/provided:    GI of foods and how it affects your metabolism and helps you burn fat, carb counting    [x] Facilitated completion of needed exams and screenings by reviewing Diabetic/Health Maintenance Report Card with patient.  [x] Provided pt with my business card.  [x] Informed of/reviewed how to access health  and after hours assistance.  [x]  Discussed, planned, and scheduled future contacts.  [x]Challenges/Barriers identified discussed as identified by patient.  [x] Needs identified by this Health :    Education and support     [] Other:            For additional care management support patient was referred to:        []  Community resources for                        []  Medication assistance programs               []  Dietician              []  Diabetes  Boot Camp                                        []  Mental health services                           []                  []  Diabetes [place]                                              []  Home health services                                []  Complex case management              []  PCP/covering provider due to                 []  Emergency Dept.                                  []  GYN              []  Optometrist/ Ophthalmologist                          []  Podiatrist                                                      [x]  N/A        []  Other:           RESPONSE/IMPRESSION  Behavior is consistent with the following stage of change:  []  Pre-contemplation  []  Contemplation  [x]  Preparation  []  Action  []  Maintenance  []  Relapse    Level of participation:  []  Refused to participate  []  Guarded / resistant  []  Passive participant  [x]  Active participant/interactive  [x]  Interested/receptive  [x]  Self-motivated  []  Other          Goal developed by patient today:  10/10  Being ready for changing my lifestyle  Plan better meal plans  Prepare healthy foods  Consistency  Awareness do's and don'ts  No bread  No blues, yellows and no sugar  No potatoes pasta   No sodas, crystal light   No coffee  More water and lemon    Plan (needed actions to accomplish goal):          [x] Pt will work on action plan as stated above.        [x] Pt will follow up with Health  on   8/8/18  At 1200.    [x] Health  will remain available.  [x] Health  will maintain regular  contacts with patient to educate, support, encourage; help problem-solve; assist with development of self-advocacy/increasing independent health management; and provide resources as needed.  [] Pt has declined Health  Program at this time. Provided pt with Health  Program information should they decide to participate at a later time.        [] Pt to facilitate contact with Health        [] Health  to facilitate contact with patient.        [] Other:          Notes:   Met with patient she is interested in losing weight to get down to 130 lbs.   Her highest weight was 197 lb.   Her best reasons are to lose for her health, have healthy lifestyle, can feel on body feels trapped, appearance and extra weight keeps tired.  Goals set by patient and will continue to work with patient to assist to reach her goals.      Best Method of Contact:  [x] email:   [x] Phone:   [] Mail  [] Office     Nikole Cuellar RN

## 2018-08-08 ENCOUNTER — CLINICAL SUPPORT (OUTPATIENT)
Dept: INTERNAL MEDICINE | Facility: CLINIC | Age: 58
End: 2018-08-08
Payer: COMMERCIAL

## 2018-08-08 VITALS — WEIGHT: 194.44 LBS | BODY MASS INDEX: 43.59 KG/M2

## 2018-08-08 NOTE — PROGRESS NOTES
Health  Follow-Up Note   [x] Office  [] Phone  Notes from previous session reviewed.   [x] Previous Session Goals unchanged.   [] Patient/Caregiver Change in Goals.  Goals added or changed by Patient/Caregiver since program participation:  1.     Keep on same track  2. Being more mindfull  3. Eliminate sodas and artificial sweetners     Additional/Changed support that patient/caregiver has experienced/sought?  (Indicate readiness, support from family, friends, others, community groups, etc)  1.  none    Additional/Changed obstacles that could prevent patient/caregiver from reaching their goals?  1.  going to wedding and another event where lots of food    Feedback provided:  1.  Praised for effort and determintion    Diagnostic values/Desriptors for follow-up as needed for chronic condition(s)   Weight: 88.2 kg 194.45 gain 0.22 lb down on her home scale by 4-5 lbs     Interventions:   1. Health  listened reflectively, validated thoughts and feelings, offered support and encouragement.   2. Allowed patient to express themselves in a non-biased atmosphere.  3. Health  assisted pt to problem-solve obstacles such as being in a challenging environment and dealing with these challenges.   4. Motivational Interviewed interventions utilized (OARS).   5. Patient responded favorably to interventions and remained actively engaged in the session.   6. Health  will remain available and connected for patient by phone and/or office visits.   7. Positive reinforcement, emotional support and encouragement provided.   8. Focused Education: MI    Plan:  [x] Pt will work on goals as stated above.   [x] Pt will contact Health  for any questions, concerns or needs.  [x] Pt will follow up with Health  in office on   8/15/18 at 1700.     [] Pt will follow up with Health  on phone in:        [x] Health  will remain available.   [] Health  will contact patient by phone in:        [] Health   will consult:        [] Health  will inform Provider via EPIC messaging.     Impression:  1. Behavior is consistent with    Action   Stage of Change.   2. Participation level:       [x] Receptive      [x] Interactive      [] Guarded and Resistant      [x] Self Motivated      [] Refused/Declined to participate   3. [x] Pt voiced understanding of all information presented.       [] Pt voiced needing further information/education. This will be arranged.       [x] Pt would benefit from further education/information as identified by this health . This will be arranged.     Nikole Cuellar RN HC

## 2018-08-15 ENCOUNTER — CLINICAL SUPPORT (OUTPATIENT)
Dept: INTERNAL MEDICINE | Facility: CLINIC | Age: 58
End: 2018-08-15
Payer: COMMERCIAL

## 2018-08-15 VITALS — BODY MASS INDEX: 43.5 KG/M2 | WEIGHT: 194 LBS

## 2018-08-15 NOTE — PROGRESS NOTES
Health  Follow-Up Note   [x] Office  [] Phone  Notes from previous session reviewed.   [x] Previous Session Goals unchanged.   [] Patient/Caregiver Change in Goals.  Goals added or changed by Patient/Caregiver since program participation:  1.     Continue same plan  2. Buy new scale     Additional/Changed support that patient/caregiver has experienced/sought?  (Indicate readiness, support from family, friends, others, community groups, etc)  1.  Fiance' with her today    Additional/Changed obstacles that could prevent patient/caregiver from reaching their goals?  1.  Temptations all around at work  2. Eating out making healthy choices    Feedback provided:  1.  Praised for continued effort and determination    Diagnostic values/Desriptors for follow-up as needed for chronic condition(s)   Weight: 88 kg 194 lb down 0.44 lb    Interventions:   1. Health  listened reflectively, validated thoughts and feelings, offered support and encouragement.   2. Allowed patient to express themselves in a non-biased atmosphere.  3. Health  assisted pt to problem-solve obstacles such as being in a challenging environment and dealing with these challenges.   4. Motivational Interviewed interventions utilized (OARS).   5. Patient responded favorably to interventions and remained actively engaged in the session.   6. Health  will remain available and connected for patient by phone and/or office visits.   7. Positive reinforcement, emotional support and encouragement provided.   8. Focused Education: MI    Plan:  [x] Pt will work on goals as stated above.   [x] Pt will contact Health  for any questions, concerns or needs.  [x] Pt will follow up with Health  in office on  8/23/18 at 1700      [] Pt will follow up with Health  on phone in:        [x] Health  will remain available.   [] Health  will contact patient by phone in:        [] Health  will consult:        [] Health  will  inform Provider via EPIC messaging.     Impression:  1. Behavior is consistent with    Action   Stage of Change.   2. Participation level:       [x] Receptive      [x] Interactive      [] Guarded and Resistant      [x] Self Motivated      [] Refused/Declined to participate   3. [x] Pt voiced understanding of all information presented.       [] Pt voiced needing further information/education. This will be arranged.       [x] Pt would benefit from further education/information as identified by this health . This will be arranged.     Nikole Cuellar RN HC

## 2018-11-26 ENCOUNTER — PATIENT OUTREACH (OUTPATIENT)
Dept: ADMINISTRATIVE | Facility: HOSPITAL | Age: 58
End: 2018-11-26

## 2018-11-26 NOTE — PROGRESS NOTES
Contacted the patient to schedule overdue mammogram    Patient informed that she has completed her mammogram this year at [facility].     updated , records requested.

## 2018-12-08 ENCOUNTER — LAB VISIT (OUTPATIENT)
Dept: LAB | Facility: HOSPITAL | Age: 58
End: 2018-12-08
Attending: FAMILY MEDICINE
Payer: COMMERCIAL

## 2018-12-08 DIAGNOSIS — E78.5 HYPERLIPIDEMIA, UNSPECIFIED HYPERLIPIDEMIA TYPE: ICD-10-CM

## 2018-12-08 DIAGNOSIS — R73.03 PREDIABETES: ICD-10-CM

## 2018-12-08 LAB
CHOLEST SERPL-MCNC: 212 MG/DL
CHOLEST/HDLC SERPL: 3.8 {RATIO}
ESTIMATED AVG GLUCOSE: 123 MG/DL
HBA1C MFR BLD HPLC: 5.9 %
HDLC SERPL-MCNC: 56 MG/DL
HDLC SERPL: 26.4 %
LDLC SERPL CALC-MCNC: 144.2 MG/DL
NONHDLC SERPL-MCNC: 156 MG/DL
TRIGL SERPL-MCNC: 59 MG/DL

## 2018-12-08 PROCEDURE — 83036 HEMOGLOBIN GLYCOSYLATED A1C: CPT

## 2018-12-08 PROCEDURE — 80061 LIPID PANEL: CPT

## 2018-12-08 PROCEDURE — 36415 COLL VENOUS BLD VENIPUNCTURE: CPT

## 2018-12-10 ENCOUNTER — OFFICE VISIT (OUTPATIENT)
Dept: INTERNAL MEDICINE | Facility: CLINIC | Age: 58
End: 2018-12-10
Payer: COMMERCIAL

## 2018-12-10 VITALS
SYSTOLIC BLOOD PRESSURE: 130 MMHG | BODY MASS INDEX: 38.79 KG/M2 | HEIGHT: 60 IN | HEART RATE: 81 BPM | OXYGEN SATURATION: 98 % | WEIGHT: 197.56 LBS | DIASTOLIC BLOOD PRESSURE: 62 MMHG

## 2018-12-10 DIAGNOSIS — R73.03 PREDIABETES: Primary | ICD-10-CM

## 2018-12-10 DIAGNOSIS — E78.5 HYPERLIPIDEMIA, UNSPECIFIED HYPERLIPIDEMIA TYPE: ICD-10-CM

## 2018-12-10 PROCEDURE — 99214 OFFICE O/P EST MOD 30 MIN: CPT | Mod: 25,S$GLB,, | Performed by: FAMILY MEDICINE

## 2018-12-10 PROCEDURE — 3008F BODY MASS INDEX DOCD: CPT | Mod: CPTII,S$GLB,, | Performed by: FAMILY MEDICINE

## 2018-12-10 PROCEDURE — 90686 IIV4 VACC NO PRSV 0.5 ML IM: CPT | Mod: S$GLB,,, | Performed by: FAMILY MEDICINE

## 2018-12-10 PROCEDURE — 99999 PR PBB SHADOW E&M-EST. PATIENT-LVL IV: CPT | Mod: PBBFAC,,, | Performed by: FAMILY MEDICINE

## 2018-12-10 PROCEDURE — 90471 IMMUNIZATION ADMIN: CPT | Mod: S$GLB,,, | Performed by: FAMILY MEDICINE

## 2018-12-10 RX ORDER — PRAVASTATIN SODIUM 40 MG/1
40 TABLET ORAL DAILY
Qty: 90 TABLET | Refills: 1 | Status: SHIPPED | OUTPATIENT
Start: 2018-12-10 | End: 2019-03-27 | Stop reason: SDUPTHER

## 2018-12-10 NOTE — PROGRESS NOTES
After obtaining consent, and per orders of Dr. Yip, injection of fluzone Lot [id number] 6/30/19 given in the RD by RICA DA SILVA. Patient tolerated well and band aid applied. Patient instructed to remain in clinic for 15 minutes afterwards, and to report any adverse reaction to me immediately.

## 2018-12-10 NOTE — LETTER
December 10, 2018      [facility]/ [facility]  [address] [address]  Phone: [phone]  Fax: [phone]       Patient: Jyoti Blue   YOB: 1960  Date of Visit: 12/10/2018    To Whom It May Concern:    Deena Blue  was at [facility] on 12/10/2018. She may return to work/school on 12/10/2018 with no restrictions. If you have any questions or concerns, or if I can be of further assistance, please do not hesitate to contact me.    Sincerely,    Tj Trotter MA

## 2018-12-10 NOTE — PROGRESS NOTES
Subjective:       Patient ID: Jyoti Blue is a 58 y.o. female.    Chief Complaint: Follow-up    HPI  59 y/o female with HLPD, OA of R knee, Prediabetes is here for follow up.  Her weight is up 2 more pounds, reports she is not doing dedicated exercise, she is active around [place] [facility], trying to eat healthy.  Denies f/n/v, occasional indigestion takes Tums about 1 night a week, denies d/constipation/cp/sob/urinary sx. Sleeping well.   GYN: followed by Dr. Quita Bess, mmg and pelvic utd 9/2018   Prediabetes: a1c 5.7 12/2018, discussed metformin  HLPD: tolerating Pravastatin  OTC: aleve or ibuprofen  Colonoscopy done in 2011 was normal  Eye exam utd  Dental utd  Vaccines: utd    Review of Systems   Constitutional: Negative for activity change, appetite change, fatigue and fever.   Respiratory: Negative for cough and shortness of breath.    Cardiovascular: Negative for chest pain, palpitations and leg swelling.   Gastrointestinal: Negative for constipation, diarrhea, nausea and vomiting.   Genitourinary: Negative for difficulty urinating and dysuria.   Skin: Negative for rash.   Neurological: Negative for dizziness and light-headedness.   Psychiatric/Behavioral: Negative for sleep disturbance.       Objective:      /62 (BP Location: Left arm, Patient Position: Sitting, BP Method: Large (Manual))   Pulse 81   Ht 5' (1.524 m)   Wt 89.6 kg (197 lb 8.5 oz)   SpO2 98%   BMI 38.58 kg/m²   Physical Exam   Constitutional: She appears well-developed and well-nourished.   HENT:   Head: Normocephalic and atraumatic.   Mouth/Throat: No oropharyngeal exudate.   Neck: Normal range of motion. Neck supple. No thyromegaly present.   Cardiovascular: Normal rate, regular rhythm and normal heart sounds.   Pulmonary/Chest: Effort normal and breath sounds normal. No respiratory distress.   Musculoskeletal: She exhibits no edema.   Lymphadenopathy:     She has no cervical adenopathy.   Neurological: She is alert.   Skin: Skin  is warm and dry.   Psychiatric: She has a normal mood and affect.   Nursing note and vitals reviewed.      Assessment:       1. Prediabetes    2. Hyperlipidemia, unspecified hyperlipidemia type        Plan:   Jyoti was seen today for follow-up.    Diagnoses and all orders for this visit:    Prediabetes  -     Hemoglobin A1c; Future    Hyperlipidemia, unspecified hyperlipidemia type  -     Comprehensive metabolic panel; Future  -     pravastatin (PRAVACHOL) 40 MG tablet; Take 1 tablet (40 mg total) by mouth once daily.    Other orders  -     Influenza - Quadrivalent (3 years & older) (PF)

## 2018-12-11 ENCOUNTER — TELEPHONE (OUTPATIENT)
Dept: INTERNAL MEDICINE | Facility: CLINIC | Age: 58
End: 2018-12-11

## 2018-12-11 NOTE — TELEPHONE ENCOUNTER
----- Message from Francie Yip MD sent at 12/10/2018  3:00 PM CST -----  Please call her to re-start her visits with you.  Thanks,  Francie

## 2018-12-12 ENCOUNTER — TELEPHONE (OUTPATIENT)
Dept: INTERNAL MEDICINE | Facility: CLINIC | Age: 58
End: 2018-12-12

## 2019-01-15 ENCOUNTER — TELEPHONE (OUTPATIENT)
Dept: INTERNAL MEDICINE | Facility: CLINIC | Age: 59
End: 2019-01-15

## 2019-01-15 DIAGNOSIS — I77.9 BILATERAL CAROTID ARTERY DISEASE, UNSPECIFIED TYPE: ICD-10-CM

## 2019-01-16 ENCOUNTER — TELEPHONE (OUTPATIENT)
Dept: INTERNAL MEDICINE | Facility: CLINIC | Age: 59
End: 2019-01-16

## 2019-01-16 PROBLEM — I77.9 BILATERAL CAROTID ARTERY DISEASE: Status: ACTIVE | Noted: 2019-01-16

## 2019-01-16 NOTE — TELEPHONE ENCOUNTER
Please let him know that I received his MRI from [facility] brain and spine.  It does show that he has significant carotid artery disease. I recommend we do a carotid artery ultrasound to further evaluate.  Please help set this up.  All of

## 2019-01-16 NOTE — TELEPHONE ENCOUNTER
Informed pt of MRI results. Pt verbalized understanding. Pt is schedule for US on 1/18/19. Pt has no further questions or concerns.

## 2019-01-18 ENCOUNTER — CLINICAL SUPPORT (OUTPATIENT)
Dept: CARDIOLOGY | Facility: CLINIC | Age: 59
End: 2019-01-18
Attending: FAMILY MEDICINE
Payer: COMMERCIAL

## 2019-01-18 DIAGNOSIS — I77.9 BILATERAL CAROTID ARTERY DISEASE, UNSPECIFIED TYPE: ICD-10-CM

## 2019-01-18 LAB
LEFT ARM DIASTOLIC BLOOD PRESSURE: 70 MMHG
LEFT ARM SYSTOLIC BLOOD PRESSURE: 131 MMHG
LEFT CBA DIAS: 22 CM/S
LEFT CBA SYS: 97 CM/S
LEFT CCA DIST DIAS: 23 CM/S
LEFT CCA DIST SYS: 102 CM/S
LEFT CCA MID DIAS: 21 CM/S
LEFT CCA MID SYS: 90 CM/S
LEFT CCA PROX DIAS: 21 CM/S
LEFT CCA PROX SYS: 115 CM/S
LEFT ECA DIAS: 13 CM/S
LEFT ECA SYS: 108 CM/S
LEFT ICA DIST DIAS: 43 CM/S
LEFT ICA DIST SYS: 114 CM/S
LEFT ICA MID DIAS: 31 CM/S
LEFT ICA MID SYS: 107 CM/S
LEFT ICA PROX DIAS: 31 CM/S
LEFT ICA PROX SYS: 92 CM/S
LEFT VERTEBRAL DIAS: 15 CM/S
LEFT VERTEBRAL SYS: 61 CM/S
OHS CV CAROTID RIGHT ICA EDV HIGHEST: 34
OHS CV CAROTID ULTRASOUND LEFT ICA/CCA RATIO: 0.99
OHS CV CAROTID ULTRASOUND RIGHT ICA/CCA RATIO: 0.8
OHS CV PV CAROTID LEFT HIGHEST CCA: 115
OHS CV PV CAROTID LEFT HIGHEST ICA: 114
OHS CV PV CAROTID RIGHT HIGHEST CCA: 141
OHS CV PV CAROTID RIGHT HIGHEST ICA: 113
OHS CV US CAROTID LEFT HIGHEST EDV: 43
RIGHT ARM DIASTOLIC BLOOD PRESSURE: 62 MMHG
RIGHT ARM SYSTOLIC BLOOD PRESSURE: 130 MMHG
RIGHT CBA DIAS: 23 CM/S
RIGHT CBA SYS: 87 CM/S
RIGHT CCA DIST DIAS: 27 CM/S
RIGHT CCA DIST SYS: 94 CM/S
RIGHT CCA MID DIAS: 24 CM/S
RIGHT CCA MID SYS: 110 CM/S
RIGHT CCA PROX DIAS: 24 CM/S
RIGHT CCA PROX SYS: 141 CM/S
RIGHT ECA DIAS: 37 CM/S
RIGHT ECA SYS: 231 CM/S
RIGHT ICA DIST DIAS: 34 CM/S
RIGHT ICA DIST SYS: 113 CM/S
RIGHT ICA MID DIAS: 23 CM/S
RIGHT ICA MID SYS: 68 CM/S
RIGHT ICA PROX DIAS: 22 CM/S
RIGHT ICA PROX SYS: 69 CM/S
RIGHT VERTEBRAL DIAS: 17 CM/S
RIGHT VERTEBRAL SYS: 64 CM/S

## 2019-01-18 PROCEDURE — 93880 EXTRACRANIAL BILAT STUDY: CPT | Mod: S$GLB,,, | Performed by: INTERNAL MEDICINE

## 2019-01-18 PROCEDURE — 93880 CV US DOPPLER CAROTID (CUPID ONLY): ICD-10-PCS | Mod: S$GLB,,, | Performed by: INTERNAL MEDICINE

## 2019-01-22 ENCOUNTER — TELEPHONE (OUTPATIENT)
Dept: INTERNAL MEDICINE | Facility: CLINIC | Age: 59
End: 2019-01-22

## 2019-01-22 DIAGNOSIS — I77.9 BILATERAL CAROTID ARTERY DISEASE, UNSPECIFIED TYPE: Primary | ICD-10-CM

## 2019-01-22 NOTE — TELEPHONE ENCOUNTER
Please let her know her carotid ultrasound showed carotid artery disease on both sides and some carotid artery stenosis greater on the right than the left.  I recommend we get an opinion from vascular Medicine.  Please help with appointment.

## 2019-01-22 NOTE — TELEPHONE ENCOUNTER
----- Message from Chelsea Hutson sent at 1/22/2019  2:34 PM CST -----  Contact: pt   Name of Who is Calling: MARIBEL DEVI [[id number]]       What is the request in detail:  Patient is returning a call in regards to her US results.... Please contact to further discuss and advise.       Can the clinic reply by [facility]: no       What Number to Call Back if not in [facility]: [phone]

## 2019-01-28 ENCOUNTER — OFFICE VISIT (OUTPATIENT)
Dept: VASCULAR SURGERY | Facility: CLINIC | Age: 59
End: 2019-01-28
Payer: COMMERCIAL

## 2019-01-28 VITALS
WEIGHT: 196.19 LBS | SYSTOLIC BLOOD PRESSURE: 144 MMHG | DIASTOLIC BLOOD PRESSURE: 68 MMHG | HEART RATE: 68 BPM | HEIGHT: 60 IN | BODY MASS INDEX: 38.52 KG/M2 | TEMPERATURE: 98 F

## 2019-01-28 DIAGNOSIS — I65.23 BILATERAL CAROTID ARTERY STENOSIS: Primary | ICD-10-CM

## 2019-01-28 PROCEDURE — 99999 PR PBB SHADOW E&M-EST. PATIENT-LVL III: ICD-10-PCS | Mod: PBBFAC,,, | Performed by: SURGERY

## 2019-01-28 PROCEDURE — 99999 PR PBB SHADOW E&M-EST. PATIENT-LVL III: CPT | Mod: PBBFAC,,, | Performed by: SURGERY

## 2019-01-28 PROCEDURE — 99244 OFF/OP CNSLTJ NEW/EST MOD 40: CPT | Mod: S$GLB,,, | Performed by: SURGERY

## 2019-01-28 PROCEDURE — 99244 PR OFFICE CONSULTATION,LEVEL IV: ICD-10-PCS | Mod: S$GLB,,, | Performed by: SURGERY

## 2019-01-28 NOTE — LETTER
January 29, 2019      Francie Yip MD  [address]  [address]  [place] [address]           [facility] - Vascular Surgery  [address]  [place] [address]  Phone: [phone]  Fax: [phone]          Patient: Jyoti Blue   MR Number: [id number]   YOB: 1960   Date of Visit: 1/28/2019       Dear Dr. Francie Yip:    Thank you for referring Jyoti Blue to me for evaluation. Attached you will find relevant portions of my assessment and plan of care.    If you have questions, please do not hesitate to call me. I look forward to following Jyoti Blue along with you.    Sincerely,    Chantell Rainey MD    Enclosure  CC:  No Recipients    If you would like to receive this communication electronically, please contact [email or web address] or [phone] to request more information on [facility] Link access.    For providers and/or their staff who would like to refer a patient to [facility], please contact us through our one-stop-shop provider referral line, [facility], at [phone].    If you feel you have received this communication in error or would no longer like to receive these types of communications, please e-mail [email or web address]

## 2019-01-28 NOTE — PROGRESS NOTES
Patient ID: Jyoti Blue is a 58 y.o. female.    I. HISTORY     Chief Complaint: Consult      HPI Patient is a 59 yo F who presents to clinic as a referral from her PCP for bilateral carotid disease. She originally got a xray of her neck from an orthopedist because of a fall. Due to findings she was referring to her PCP who continued the workup with a bilateral US. Today she denies headaches, episodes of TIA, hx of stroke like symptoms.    Tobacco: 0.5 ppd for 3 years    Review of Systems   Constitution: Negative for weakness and malaise/fatigue.   HENT: Negative for congestion and hearing loss.    Eyes: Negative for blurred vision, vision loss in left eye and vision loss in right eye.   Cardiovascular: Positive for palpitations. Negative for chest pain.   Respiratory: Negative for cough and shortness of breath.    Musculoskeletal: Positive for neck pain (R lower). Negative for muscle weakness.   Neurological: Negative for dizziness, focal weakness, headaches, light-headedness and numbness.       II. PHYSICAL EXAM   Right Arm BP - Sittin/68 (19 1544)  Left Arm BP - Sittin/81 (19 1544)  Pulse: 68  Temp: 98.2 °F (36.8 °C)  Body mass index is 38.32 kg/m².      Physical Exam   Constitutional: She is oriented to person, place, and time. She appears well-developed and well-nourished. No distress.   HENT:   Head: Normocephalic and atraumatic.   Eyes: Conjunctivae and EOM are normal.   Neck: Normal range of motion. Neck supple.   Cardiovascular: Normal rate and intact distal pulses.   Pulmonary/Chest: Effort normal. No respiratory distress.   Musculoskeletal: Normal range of motion. She exhibits no edema.   Neurological: She is alert and oriented to person, place, and time. No cranial nerve deficit.   Skin: Skin is warm and dry.       III. ASSESSMENT & PLAN (MEDICAL DECISION MAKING)     1. Bilateral carotid artery stenosis        Imaging Results:  Carotid Duplex:  Cardiology, 2019  R L     <50%  PSV 113cm/s   <50%  PSV 114cm/s     Assessment/Diagnosis and Plan:    Patient is a 57 yo F who presents to clinic as a referral from her PCP for bilateral carotid disease.     US without evidence of significant carotid disease.  Follow up as needed.       Vascular Surgery Staff  I have seen and examined the patient and reviewed the residents note. I agree with their assessment and plan.    Jyoti Blue is a 58 y.o. female referred from Dr. Francie Yip for evaluation and management of asymptomatic carotid stenosis.   Denies any history of arm or leg numbness or weakness, facial droop, vision changes or slurred speech. No significant complaints today.  Some abnormality noted on imaging after a recent fall which prompted the carotid duplex.    No intervention warranted for mild bilateral carotid stenosis.  Must abstain from smoking.  Needs tight BP management and aggressive cardiac risk factor modification  Continue daily Statin and recommend starting daily 81mg ASA.    Repeat Carotid duplex in 5 years.    Chantell Rainey MD FACS [facility]  Vascular & Endovascular Surgery

## 2019-03-25 ENCOUNTER — LAB VISIT (OUTPATIENT)
Dept: LAB | Facility: HOSPITAL | Age: 59
End: 2019-03-25
Attending: FAMILY MEDICINE
Payer: COMMERCIAL

## 2019-03-25 DIAGNOSIS — E78.5 HYPERLIPIDEMIA, UNSPECIFIED HYPERLIPIDEMIA TYPE: ICD-10-CM

## 2019-03-25 DIAGNOSIS — R73.03 PREDIABETES: ICD-10-CM

## 2019-03-25 LAB
ALBUMIN SERPL BCP-MCNC: 3.9 G/DL (ref 3.5–5.2)
ALP SERPL-CCNC: 73 U/L (ref 55–135)
ALT SERPL W/O P-5'-P-CCNC: 14 U/L (ref 10–44)
ANION GAP SERPL CALC-SCNC: 11 MMOL/L (ref 8–16)
AST SERPL-CCNC: 16 U/L (ref 10–40)
BILIRUB SERPL-MCNC: 0.4 MG/DL (ref 0.1–1)
BUN SERPL-MCNC: 15 MG/DL (ref 6–20)
CALCIUM SERPL-MCNC: 9.8 MG/DL (ref 8.7–10.5)
CHLORIDE SERPL-SCNC: 104 MMOL/L (ref 95–110)
CO2 SERPL-SCNC: 26 MMOL/L (ref 23–29)
CREAT SERPL-MCNC: 0.8 MG/DL (ref 0.5–1.4)
EST. GFR  (AFRICAN AMERICAN): >60 ML/MIN/1.73 M^2
EST. GFR  (NON AFRICAN AMERICAN): >60 ML/MIN/1.73 M^2
ESTIMATED AVG GLUCOSE: 126 MG/DL (ref 68–131)
GLUCOSE SERPL-MCNC: 100 MG/DL (ref 70–110)
HBA1C MFR BLD HPLC: 6 % (ref 4–5.6)
POTASSIUM SERPL-SCNC: 4.2 MMOL/L (ref 3.5–5.1)
PROT SERPL-MCNC: 7.6 G/DL (ref 6–8.4)
SODIUM SERPL-SCNC: 141 MMOL/L (ref 136–145)

## 2019-03-25 PROCEDURE — 80053 COMPREHEN METABOLIC PANEL: CPT

## 2019-03-25 PROCEDURE — 83036 HEMOGLOBIN GLYCOSYLATED A1C: CPT

## 2019-03-25 PROCEDURE — 36415 COLL VENOUS BLD VENIPUNCTURE: CPT

## 2019-03-26 ENCOUNTER — TELEPHONE (OUTPATIENT)
Dept: INTERNAL MEDICINE | Facility: CLINIC | Age: 59
End: 2019-03-26

## 2019-03-26 NOTE — TELEPHONE ENCOUNTER
Please let her know that her blood work looks good overall.  Her hemoglobin A1c has increased a little since our previous check.  Please offer the send her a copy of her labs.

## 2019-03-26 NOTE — TELEPHONE ENCOUNTER
Informed pt of lab results. Pt verbalized understanding. Pt states that she is dieting and watching what she eats. Pt has no further questions or concerns.

## 2019-03-27 ENCOUNTER — OFFICE VISIT (OUTPATIENT)
Dept: INTERNAL MEDICINE | Facility: CLINIC | Age: 59
End: 2019-03-27
Payer: COMMERCIAL

## 2019-03-27 VITALS
WEIGHT: 185.31 LBS | BODY MASS INDEX: 36.38 KG/M2 | OXYGEN SATURATION: 98 % | DIASTOLIC BLOOD PRESSURE: 60 MMHG | SYSTOLIC BLOOD PRESSURE: 106 MMHG | HEART RATE: 79 BPM | HEIGHT: 60 IN

## 2019-03-27 DIAGNOSIS — R73.03 PREDIABETES: ICD-10-CM

## 2019-03-27 DIAGNOSIS — E78.5 HYPERLIPIDEMIA, UNSPECIFIED HYPERLIPIDEMIA TYPE: Primary | ICD-10-CM

## 2019-03-27 DIAGNOSIS — I65.23 BILATERAL CAROTID ARTERY STENOSIS: ICD-10-CM

## 2019-03-27 DIAGNOSIS — E55.9 VITAMIN D DEFICIENCY: ICD-10-CM

## 2019-03-27 DIAGNOSIS — Z00.00 ANNUAL PHYSICAL EXAM: ICD-10-CM

## 2019-03-27 PROCEDURE — 99999 PR PBB SHADOW E&M-EST. PATIENT-LVL III: ICD-10-PCS | Mod: PBBFAC,,, | Performed by: FAMILY MEDICINE

## 2019-03-27 PROCEDURE — 99214 PR OFFICE/OUTPT VISIT, EST, LEVL IV, 30-39 MIN: ICD-10-PCS | Mod: S$GLB,,, | Performed by: FAMILY MEDICINE

## 2019-03-27 PROCEDURE — 3008F BODY MASS INDEX DOCD: CPT | Mod: CPTII,S$GLB,, | Performed by: FAMILY MEDICINE

## 2019-03-27 PROCEDURE — 3008F PR BODY MASS INDEX (BMI) DOCUMENTED: ICD-10-PCS | Mod: CPTII,S$GLB,, | Performed by: FAMILY MEDICINE

## 2019-03-27 PROCEDURE — 99214 OFFICE O/P EST MOD 30 MIN: CPT | Mod: S$GLB,,, | Performed by: FAMILY MEDICINE

## 2019-03-27 PROCEDURE — 99999 PR PBB SHADOW E&M-EST. PATIENT-LVL III: CPT | Mod: PBBFAC,,, | Performed by: FAMILY MEDICINE

## 2019-03-27 RX ORDER — PRAVASTATIN SODIUM 40 MG/1
40 TABLET ORAL DAILY
Qty: 90 TABLET | Refills: 1 | Status: SHIPPED | OUTPATIENT
Start: 2019-03-27 | End: 2019-09-03 | Stop reason: SDUPTHER

## 2019-03-27 RX ORDER — ASCORBIC ACID 125 MG
5 TABLET,CHEWABLE ORAL DAILY
COMMUNITY

## 2019-03-27 RX ORDER — ASPIRIN 81 MG/1
81 TABLET ORAL DAILY
Qty: 90 TABLET | Refills: 1 | Status: SHIPPED | OUTPATIENT
Start: 2019-03-27 | End: 2023-06-23

## 2019-03-27 NOTE — PROGRESS NOTES
Subjective:       Patient ID: Jyoti Blue is a 58 y.o. female.    Chief Complaint: Hyperlipidemia    HPI  57 y/o female with HLPD, OA of R knee, Prediabetes is here for follow up HLPD.    Her weight is down 12 pounds, she has given up soda and carbs, she is eating healthy in general. She has not started exercising but plans to get back.  She is feeling good in general, denies f/n/v/d/constipation/cp/sob/urinary sx. Sleeping well.     GYN: followed by Dr. Quita Bess, mmg and pelvic utd 9/2018   Prediabetes: a1c 6.0 3/2019, discussed metformin  HLPD: tolerating Pravastatin  OTC: aleve or ibuprofen  Colonoscopy done in 2011 was normal  Eye exam utd  Dental utd  Vaccines: utd    Review of Systems   Constitutional: Negative for activity change, appetite change, fatigue and fever.   Respiratory: Negative for cough and shortness of breath.    Cardiovascular: Negative for chest pain, palpitations and leg swelling.   Gastrointestinal: Negative for constipation, diarrhea, nausea and vomiting.   Genitourinary: Negative for difficulty urinating and dysuria.   Skin: Negative for rash.   Neurological: Negative for dizziness and light-headedness.   Psychiatric/Behavioral: Negative for sleep disturbance.       Objective:      /60 (BP Location: Left arm, Patient Position: Sitting, BP Method: X-Large (Manual))   Pulse 79   Ht 5' (1.524 m)   Wt 84.1 kg (185 lb 4.8 oz)   SpO2 98%   BMI 36.19 kg/m²   Physical Exam   Constitutional: She appears well-developed and well-nourished.   HENT:   Head: Normocephalic and atraumatic.   Mouth/Throat: No oropharyngeal exudate.   Neck: Normal range of motion. Neck supple. No thyromegaly present.   Cardiovascular: Normal rate, regular rhythm and normal heart sounds.   Pulmonary/Chest: Effort normal and breath sounds normal. No respiratory distress.   Musculoskeletal: She exhibits no edema.   Lymphadenopathy:     She has no cervical adenopathy.   Neurological: She is alert.   Skin:  Skin is warm and dry.   Psychiatric: She has a normal mood and affect.   Nursing note and vitals reviewed.      Assessment:       1. Hyperlipidemia, unspecified hyperlipidemia type    2. Bilateral carotid artery stenosis    3. Prediabetes    4. Annual physical exam    5. Vitamin D deficiency        Plan:   Jyoti was seen today for hyperlipidemia.    Diagnoses and all orders for this visit:    Hyperlipidemia, unspecified hyperlipidemia type  -     Comprehensive metabolic panel; Future  -     Lipid panel; Future  -     pravastatin (PRAVACHOL) 40 MG tablet; Take 1 tablet (40 mg total) by mouth once daily.    Bilateral carotid artery stenosis  -     aspirin (ECOTRIN) 81 MG EC tablet; Take 1 tablet (81 mg total) by mouth once daily.  -     pravastatin (PRAVACHOL) 40 MG tablet; Take 1 tablet (40 mg total) by mouth once daily.    Prediabetes  -     Hemoglobin A1c; Future    Annual physical exam  -     Vitamin D; Future  -     Hemoglobin A1c; Future  -     Comprehensive metabolic panel; Future  -     TSH; Future  -     CBC auto differential; Future  -     Lipid panel; Future    Vitamin D deficiency  -     Vitamin D; Future

## 2019-03-27 NOTE — PATIENT INSTRUCTIONS
Taking Aspirin for Atherosclerosis       Aspirin is a medicine often prescribed to treat atherosclerosis. This condition affects your arteries. These are the blood vessels that carry blood away from your heart. Having atherosclerosis means youre at greater risk of a heart attack or stroke. Aspirin can help prevent these from happening.  How atherosclerosis affects your arteries   A fatty material (plaque) can build up in your arteries. This makes it harder for blood to flow through them. A blood clot can then form on the plaque. This may block the artery, cutting off blood flow. This can cause conditions such as coronary artery disease (CAD) and peripheral arterial disease (PAD):  · CAD occurs when plaque builds up in the coronary artery. This artery supplies the heart with oxygen-rich blood.  · PAD occurs when plaque forms in leg arteries.  The same things that cause CAD and PAD can also cause plaque to form in other arteries in your body, such as those in the brain. When plaque occurs in any of these arteries, it raises your risk of heart attack or stroke.  What aspirin does  Aspirin is a blood-thinner (antiplatelet medicine). It helps keep blood clots from forming. This reduces the risk of blockage. Aspirin can be taken daily if you are at high risk of or have already had a heart attack or stroke. It is also used after a procedure called a stent placement. This is when a tiny wire mesh tube, or stent, is placed in an artery to keep it open. Aspirin helps prevent blood clots from forming on the stent.  Taking aspirin safely  Tell your healthcare provider about any medicines you are taking. This includes:  · All prescription medicines  · Over-the-counter medicines  · Herbs, vitamins, and other supplements  Also mention if you have a history of ulcers or bleeding problems. Ask whether you will need to stop taking aspirin before having surgery or dental work. Always take medicines as directed.  Tips for taking  aspirin  · Have a routine. For example, take aspirin with the same meal each day.  · Dont take more than prescribed. A low dose gives the same benefit as a higher one, with a lower risk of side effects.  · Dont skip doses. Aspirin needs to be taken each day to work well.  · Keep track of what you take. A pillbox with days of the week can help, especially if you take several medicines. Or use a list or chart to keep track.  When to call your healthcare provider  Side effects of aspirin are not usually serious. If you do have problems, changing your dose may help. Call your healthcare provider if you have any of the following:  · Excessive bruising (some bruising is normal)  · Nosebleeds, bleeding gums, or other excessive bleeding  · An upset stomach or stomach pain   Date Last Reviewed: 6/1/2016  © 4677-8762 [employer]. [address] [place] [address], [place] [address]. All rights reserved. This information is not intended as a substitute for professional medical care. Always follow your healthcare professional's instructions.

## 2019-08-31 ENCOUNTER — LAB VISIT (OUTPATIENT)
Dept: LAB | Facility: HOSPITAL | Age: 59
End: 2019-08-31
Attending: FAMILY MEDICINE
Payer: COMMERCIAL

## 2019-08-31 DIAGNOSIS — E55.9 VITAMIN D DEFICIENCY: ICD-10-CM

## 2019-08-31 DIAGNOSIS — E78.5 HYPERLIPIDEMIA, UNSPECIFIED HYPERLIPIDEMIA TYPE: ICD-10-CM

## 2019-08-31 DIAGNOSIS — Z00.00 ANNUAL PHYSICAL EXAM: ICD-10-CM

## 2019-08-31 DIAGNOSIS — R73.03 PREDIABETES: ICD-10-CM

## 2019-08-31 LAB
25(OH)D3+25(OH)D2 SERPL-MCNC: 26 NG/ML (ref 30–96)
ALBUMIN SERPL BCP-MCNC: 3.8 G/DL (ref 3.5–5.2)
ALP SERPL-CCNC: 83 U/L (ref 55–135)
ALT SERPL W/O P-5'-P-CCNC: 15 U/L (ref 10–44)
ANION GAP SERPL CALC-SCNC: 10 MMOL/L (ref 8–16)
AST SERPL-CCNC: 19 U/L (ref 10–40)
BASOPHILS # BLD AUTO: 0.01 K/UL (ref 0–0.2)
BASOPHILS NFR BLD: 0.3 % (ref 0–1.9)
BILIRUB SERPL-MCNC: 0.4 MG/DL (ref 0.1–1)
BUN SERPL-MCNC: 15 MG/DL (ref 6–20)
CALCIUM SERPL-MCNC: 10 MG/DL (ref 8.7–10.5)
CHLORIDE SERPL-SCNC: 105 MMOL/L (ref 95–110)
CHOLEST SERPL-MCNC: 175 MG/DL (ref 120–199)
CHOLEST/HDLC SERPL: 2.9 {RATIO} (ref 2–5)
CO2 SERPL-SCNC: 25 MMOL/L (ref 23–29)
CREAT SERPL-MCNC: 0.7 MG/DL (ref 0.5–1.4)
DIFFERENTIAL METHOD: ABNORMAL
EOSINOPHIL # BLD AUTO: 0.1 K/UL (ref 0–0.5)
EOSINOPHIL NFR BLD: 1.5 % (ref 0–8)
ERYTHROCYTE [DISTWIDTH] IN BLOOD BY AUTOMATED COUNT: 14.8 % (ref 11.5–14.5)
EST. GFR  (AFRICAN AMERICAN): >60 ML/MIN/1.73 M^2
EST. GFR  (NON AFRICAN AMERICAN): >60 ML/MIN/1.73 M^2
ESTIMATED AVG GLUCOSE: 123 MG/DL (ref 68–131)
GLUCOSE SERPL-MCNC: 92 MG/DL (ref 70–110)
HBA1C MFR BLD HPLC: 5.9 % (ref 4–5.6)
HCT VFR BLD AUTO: 38.2 % (ref 37–48.5)
HDLC SERPL-MCNC: 60 MG/DL (ref 40–75)
HDLC SERPL: 34.3 % (ref 20–50)
HGB BLD-MCNC: 12.5 G/DL (ref 12–16)
LDLC SERPL CALC-MCNC: 106.2 MG/DL (ref 63–159)
LYMPHOCYTES # BLD AUTO: 1.3 K/UL (ref 1–4.8)
LYMPHOCYTES NFR BLD: 39.3 % (ref 18–48)
MCH RBC QN AUTO: 28.5 PG (ref 27–31)
MCHC RBC AUTO-ENTMCNC: 32.7 G/DL (ref 32–36)
MCV RBC AUTO: 87 FL (ref 82–98)
MONOCYTES # BLD AUTO: 0.3 K/UL (ref 0.3–1)
MONOCYTES NFR BLD: 9.3 % (ref 4–15)
NEUTROPHILS # BLD AUTO: 1.7 K/UL (ref 1.8–7.7)
NEUTROPHILS NFR BLD: 49.6 % (ref 38–73)
NONHDLC SERPL-MCNC: 115 MG/DL
PLATELET # BLD AUTO: 238 K/UL (ref 150–350)
PMV BLD AUTO: 12 FL (ref 9.2–12.9)
POTASSIUM SERPL-SCNC: 4.3 MMOL/L (ref 3.5–5.1)
PROT SERPL-MCNC: 7.4 G/DL (ref 6–8.4)
RBC # BLD AUTO: 4.39 M/UL (ref 4–5.4)
SODIUM SERPL-SCNC: 140 MMOL/L (ref 136–145)
TRIGL SERPL-MCNC: 44 MG/DL (ref 30–150)
TSH SERPL DL<=0.005 MIU/L-ACNC: 1.23 UIU/ML (ref 0.4–4)
WBC # BLD AUTO: 3.33 K/UL (ref 3.9–12.7)

## 2019-08-31 PROCEDURE — 83036 HEMOGLOBIN GLYCOSYLATED A1C: CPT

## 2019-08-31 PROCEDURE — 36415 COLL VENOUS BLD VENIPUNCTURE: CPT

## 2019-08-31 PROCEDURE — 80053 COMPREHEN METABOLIC PANEL: CPT

## 2019-08-31 PROCEDURE — 85025 COMPLETE CBC W/AUTO DIFF WBC: CPT

## 2019-08-31 PROCEDURE — 84443 ASSAY THYROID STIM HORMONE: CPT

## 2019-08-31 PROCEDURE — 82306 VITAMIN D 25 HYDROXY: CPT

## 2019-08-31 PROCEDURE — 80061 LIPID PANEL: CPT

## 2019-09-03 ENCOUNTER — OFFICE VISIT (OUTPATIENT)
Dept: INTERNAL MEDICINE | Facility: CLINIC | Age: 59
End: 2019-09-03
Payer: COMMERCIAL

## 2019-09-03 VITALS
WEIGHT: 173 LBS | OXYGEN SATURATION: 98 % | BODY MASS INDEX: 33.96 KG/M2 | HEIGHT: 60 IN | HEART RATE: 75 BPM | SYSTOLIC BLOOD PRESSURE: 130 MMHG | DIASTOLIC BLOOD PRESSURE: 60 MMHG

## 2019-09-03 DIAGNOSIS — I65.23 BILATERAL CAROTID ARTERY STENOSIS: ICD-10-CM

## 2019-09-03 DIAGNOSIS — M17.11 PRIMARY OSTEOARTHRITIS OF RIGHT KNEE: ICD-10-CM

## 2019-09-03 DIAGNOSIS — E55.9 VITAMIN D DEFICIENCY: ICD-10-CM

## 2019-09-03 DIAGNOSIS — E78.5 HYPERLIPIDEMIA, UNSPECIFIED HYPERLIPIDEMIA TYPE: ICD-10-CM

## 2019-09-03 DIAGNOSIS — R73.03 PREDIABETES: ICD-10-CM

## 2019-09-03 DIAGNOSIS — Z00.00 ANNUAL PHYSICAL EXAM: Primary | ICD-10-CM

## 2019-09-03 PROCEDURE — 99999 PR PBB SHADOW E&M-EST. PATIENT-LVL III: CPT | Mod: PBBFAC,,, | Performed by: FAMILY MEDICINE

## 2019-09-03 PROCEDURE — 99396 PREV VISIT EST AGE 40-64: CPT | Mod: 25,S$GLB,, | Performed by: FAMILY MEDICINE

## 2019-09-03 PROCEDURE — 90471 FLU VACCINE (QUAD) GREATER THAN OR EQUAL TO 3YO PRESERVATIVE FREE IM: ICD-10-PCS | Mod: S$GLB,,, | Performed by: FAMILY MEDICINE

## 2019-09-03 PROCEDURE — 90686 IIV4 VACC NO PRSV 0.5 ML IM: CPT | Mod: S$GLB,,, | Performed by: FAMILY MEDICINE

## 2019-09-03 PROCEDURE — 90471 IMMUNIZATION ADMIN: CPT | Mod: S$GLB,,, | Performed by: FAMILY MEDICINE

## 2019-09-03 PROCEDURE — 90686 FLU VACCINE (QUAD) GREATER THAN OR EQUAL TO 3YO PRESERVATIVE FREE IM: ICD-10-PCS | Mod: S$GLB,,, | Performed by: FAMILY MEDICINE

## 2019-09-03 PROCEDURE — 99396 PR PREVENTIVE VISIT,EST,40-64: ICD-10-PCS | Mod: 25,S$GLB,, | Performed by: FAMILY MEDICINE

## 2019-09-03 PROCEDURE — 99999 PR PBB SHADOW E&M-EST. PATIENT-LVL III: ICD-10-PCS | Mod: PBBFAC,,, | Performed by: FAMILY MEDICINE

## 2019-09-03 RX ORDER — DICLOFENAC SODIUM 10 MG/G
2 GEL TOPICAL 4 TIMES DAILY
Qty: 100 G | Refills: 0 | Status: SHIPPED | OUTPATIENT
Start: 2019-09-03 | End: 2023-03-13 | Stop reason: SDUPTHER

## 2019-09-03 RX ORDER — NAPROXEN 500 MG/1
500 TABLET ORAL DAILY PRN
Qty: 30 TABLET | Refills: 0 | Status: SHIPPED | OUTPATIENT
Start: 2019-09-03 | End: 2020-03-03 | Stop reason: SDUPTHER

## 2019-09-03 RX ORDER — PRAVASTATIN SODIUM 40 MG/1
40 TABLET ORAL DAILY
Qty: 90 TABLET | Refills: 1 | Status: SHIPPED | OUTPATIENT
Start: 2019-09-03 | End: 2020-03-03 | Stop reason: SDUPTHER

## 2019-09-03 NOTE — PROGRESS NOTES
Subjective:       Patient ID: Jyoti Blue is a 59 y.o. female.    Chief Complaint: Annual Exam    HPI  60 y/o female with HLPD, OA of R knee, Prediabetes is here for annual exam.     Her weight is down 22 pounds in the past year.  She drinks mainly water, limits her carbs, trying to eat smaller portions. She is walking every other day for 30 minutes.  She feels good, she denies f/n/v/d/constipation/cp/sob/urinary sx. Sleeping well.   She gets R knee pain on and off for 10 years, pain is achy and comes and goes, she denies knee swelling, she has been to ortho and had multiple injections, she repots taking Naproxen 1-2 days a month helps.     GYN: followed by Dr. Quita Bess, mmg due DIS and pelvic due  Prediabetes: a1c 5.9 8/2019, discussed metformin  Vitamin D def: not on supplement  HLPD:  Pravastatin 40 mg daily  OTC: aleve or ibuprofen  Colonoscopy done in 2011 was normal  Eye exam utd  Dental utd  Vaccines: utd    Labs reviewed 8/2019    Review of Systems   Constitutional: Negative for activity change, appetite change, fatigue and fever.   Respiratory: Negative for cough and shortness of breath.    Cardiovascular: Negative for chest pain, palpitations and leg swelling.   Gastrointestinal: Negative for constipation, diarrhea, nausea and vomiting.   Genitourinary: Negative for difficulty urinating and dysuria.   Musculoskeletal: Positive for arthralgias.   Skin: Negative for rash.   Neurological: Negative for dizziness and light-headedness.   Psychiatric/Behavioral: Negative for sleep disturbance.       Objective:      /60 (BP Location: Left arm, Patient Position: Sitting, BP Method: Medium (Manual))   Pulse 75   Ht 5' (1.524 m)   Wt 78.5 kg (173 lb)   SpO2 98%   BMI 33.79 kg/m²   Physical Exam   Constitutional: She appears well-developed and well-nourished.   HENT:   Head: Normocephalic and atraumatic.   Mouth/Throat: No oropharyngeal exudate.   Neck: Normal range of motion. Neck supple. No  thyromegaly present.   Cardiovascular: Normal rate, regular rhythm and normal heart sounds.   Pulmonary/Chest: Effort normal and breath sounds normal. No respiratory distress.   Musculoskeletal: She exhibits no edema.   Lymphadenopathy:     She has no cervical adenopathy.   Neurological: She is alert.   Skin: Skin is warm and dry.   Psychiatric: She has a normal mood and affect.   Nursing note and vitals reviewed.      Assessment:       1. Annual physical exam    2. Hyperlipidemia, unspecified hyperlipidemia type    3. Bilateral carotid artery stenosis    4. Prediabetes    5. Primary osteoarthritis of right knee    6. Vitamin D deficiency        Plan:   Jyoti was seen today for annual exam.    Diagnoses and all orders for this visit:    Annual physical exam    Hyperlipidemia, unspecified hyperlipidemia type  -     pravastatin (PRAVACHOL) 40 MG tablet; Take 1 tablet (40 mg total) by mouth once daily.    Bilateral carotid artery stenosis  -     pravastatin (PRAVACHOL) 40 MG tablet; Take 1 tablet (40 mg total) by mouth once daily.    Prediabetes  -     Hemoglobin A1c; Future    Primary osteoarthritis of right knee  -     diclofenac sodium (VOLTAREN) 1 % Gel; Apply 2 g topically 4 (four) times daily.  -     naproxen (NAPROSYN) 500 MG tablet; Take 1 tablet (500 mg total) by mouth daily as needed.    Vitamin D deficiency  -     Vitamin D; Future

## 2019-09-03 NOTE — PROGRESS NOTES
After obtaining consent, and per orders of Dr. Yip, injection of FLUARIX Lot [id number] Exp 06/30/2020 given in the RD by Kristie Peterson. Patient tolerated well and band aid applied. Patient instructed to remain in clinic for 15 minutes afterwards, and to report any adverse reaction to me immediately.

## 2019-09-11 ENCOUNTER — TELEPHONE (OUTPATIENT)
Dept: INTERNAL MEDICINE | Facility: CLINIC | Age: 59
End: 2019-09-11

## 2019-09-11 NOTE — TELEPHONE ENCOUNTER
----- Message from Sammie Charles sent at 9/11/2019  9:06 AM CDT -----  Contact: MARIBEL DEVI   Name of Who is Calling: MARIBEL DEVI       What is the request in detail: Patient is requesting something be called in for her she has a cough,cold and chest congestion issues going on and she states she a milky yellow and green mucus going in she wants to see if some medication  can be called in for her     Can the clinic reply by [facility]: no      What Number to Call Back if not in [facility]: [phone]

## 2019-09-11 NOTE — TELEPHONE ENCOUNTER
Pt c/o cough and and chest congestion with milky yellow green mucus. Pt is requesting cough medication. I was able to schedule pt for 9/12/19 with Dr. Dickerson to discuss.

## 2019-09-12 ENCOUNTER — OFFICE VISIT (OUTPATIENT)
Dept: INTERNAL MEDICINE | Facility: CLINIC | Age: 59
End: 2019-09-12
Payer: COMMERCIAL

## 2019-09-12 VITALS
SYSTOLIC BLOOD PRESSURE: 130 MMHG | HEART RATE: 84 BPM | BODY MASS INDEX: 34.06 KG/M2 | HEIGHT: 60 IN | DIASTOLIC BLOOD PRESSURE: 62 MMHG | WEIGHT: 173.5 LBS | OXYGEN SATURATION: 97 %

## 2019-09-12 DIAGNOSIS — J06.9 VIRAL URI WITH COUGH: Primary | ICD-10-CM

## 2019-09-12 LAB
CTP QC/QA: YES
S PYO RRNA THROAT QL PROBE: NEGATIVE

## 2019-09-12 PROCEDURE — 99999 PR PBB SHADOW E&M-EST. PATIENT-LVL III: CPT | Mod: PBBFAC,,, | Performed by: FAMILY MEDICINE

## 2019-09-12 PROCEDURE — 3008F PR BODY MASS INDEX (BMI) DOCUMENTED: ICD-10-PCS | Mod: CPTII,S$GLB,, | Performed by: FAMILY MEDICINE

## 2019-09-12 PROCEDURE — 99213 PR OFFICE/OUTPT VISIT, EST, LEVL III, 20-29 MIN: ICD-10-PCS | Mod: S$GLB,,, | Performed by: FAMILY MEDICINE

## 2019-09-12 PROCEDURE — 87880 STREP A ASSAY W/OPTIC: CPT | Mod: QW,S$GLB,, | Performed by: FAMILY MEDICINE

## 2019-09-12 PROCEDURE — 3008F BODY MASS INDEX DOCD: CPT | Mod: CPTII,S$GLB,, | Performed by: FAMILY MEDICINE

## 2019-09-12 PROCEDURE — 87880 POCT RAPID STREP A: ICD-10-PCS | Mod: QW,S$GLB,, | Performed by: FAMILY MEDICINE

## 2019-09-12 PROCEDURE — 99213 OFFICE O/P EST LOW 20 MIN: CPT | Mod: S$GLB,,, | Performed by: FAMILY MEDICINE

## 2019-09-12 PROCEDURE — 99999 PR PBB SHADOW E&M-EST. PATIENT-LVL III: ICD-10-PCS | Mod: PBBFAC,,, | Performed by: FAMILY MEDICINE

## 2019-09-12 RX ORDER — BENZONATATE 200 MG/1
200 CAPSULE ORAL 3 TIMES DAILY PRN
Qty: 30 CAPSULE | Refills: 1 | Status: SHIPPED | OUTPATIENT
Start: 2019-09-12 | End: 2019-09-22

## 2019-09-12 RX ORDER — METHYLPREDNISOLONE 4 MG/1
TABLET ORAL
Qty: 21 TABLET | Refills: 0 | Status: SHIPPED | OUTPATIENT
Start: 2019-09-12 | End: 2019-10-03

## 2019-09-12 RX ORDER — PROMETHAZINE HYDROCHLORIDE AND DEXTROMETHORPHAN HYDROBROMIDE 6.25; 15 MG/5ML; MG/5ML
5 SYRUP ORAL EVERY 6 HOURS PRN
Qty: 180 ML | Refills: 0 | Status: SHIPPED | OUTPATIENT
Start: 2019-09-12 | End: 2019-09-22

## 2019-09-12 NOTE — PROGRESS NOTES
[facility]  [facility] Family Medicine/ Internal Medicine  Clinic Note      Subjective:       Patient ID: Jyoti Blue is a 59 y.o. female.    Chief Complaint: Cough; Nasal Congestion; Sore Throat; and Sinusitis    Patient is here today for sick visit.  She has been ill for the last 3-4 days, not yet getting better.  She describes ear pressure, nasal congestion, sore throat, and increasing cough.  She notes the cough is productive of green sputum, with some post-tussive gagging, no emesis.  She describes cough paroxysms, but no respiratory distress.  Her pertussis immunization is UTD.  She cites several ill contacts at home and at work.  URI    This is a new problem. Episode onset: 3-4 days. The problem has been unchanged. There has been no fever (subjective fever, chills). Associated symptoms include congestion, coughing, headaches, nausea, a plugged ear sensation, rhinorrhea, sinus pain (right-sided) and a sore throat. Pertinent negatives include no diarrhea, rash, swollen glands, vomiting or wheezing. She has tried decongestant for the symptoms. The treatment provided mild relief.     Review of Systems   Constitutional: Positive for chills and fatigue. Negative for fever.   HENT: Positive for congestion, postnasal drip, rhinorrhea, sinus pain (right-sided) and sore throat.    Respiratory: Positive for cough. Negative for shortness of breath and wheezing.    Gastrointestinal: Positive for nausea. Negative for diarrhea and vomiting.   Skin: Negative for rash.   Neurological: Positive for headaches. Negative for dizziness.       Objective:      /62 (BP Location: Left arm, Patient Position: Sitting, BP Method: Large (Manual))   Pulse 84   Ht 5' (1.524 m)   Wt 78.7 kg (173 lb 8 oz)   SpO2 97%   BMI 33.88 kg/m²   Physical Exam   Constitutional: She is oriented to person, place, and time. She appears well-developed and well-nourished. No distress.   HENT:   Head: Normocephalic and  atraumatic.   Right Ear: Ear canal normal.   Left Ear: Ear canal normal.   Nose: Rhinorrhea present. No mucosal edema. Right sinus exhibits maxillary sinus tenderness and frontal sinus tenderness. Left sinus exhibits no maxillary sinus tenderness and no frontal sinus tenderness.   Mouth/Throat: Mucous membranes are normal. Posterior oropharyngeal erythema present. No posterior oropharyngeal edema.   Bilateral cerumen impaction, EACs occluded   Neck: Normal range of motion.   Cardiovascular: Normal rate and regular rhythm.   No murmur heard.  Pulmonary/Chest: Effort normal and breath sounds normal. No tachypnea. No respiratory distress. She has no wheezes. She has no rhonchi. She has no rales.   Abdominal: Soft. Bowel sounds are normal. She exhibits no distension. There is no tenderness.   Musculoskeletal: Normal range of motion.   Lymphadenopathy:     She has no cervical adenopathy.   Neurological: She is alert and oriented to person, place, and time.   Skin: Skin is warm and dry. No rash noted.   Psychiatric: She has a normal mood and affect.   Vitals reviewed.      Assessment:       1. Viral URI with cough        Plan:     1. Viral URI with cough  - history and exam findings reviewed, reassurance provided, rapid testing today is negative for Strep   - - POCT Rapid Strep A   - differential reviewed, presentation is very consistent with acute URI versus developing sinusitis but at this point likely viral  - supportive care measures reviewed, have recommended increased oral fluids and judicious use of OTC cough-cold remedies, handout provided  - treatment options reviewed, have counseled that antibiotics are not indicated at this time, and the patient expressed understanding  - - will provide with steroid course for additional symptom relief, patient advised of potential side effects and expressed understanding, will also provide with trial of safe cough suppressants and dosing instructions reviewed  - - -  promethazine-dextromethorphan (PROMETHAZINE-DM) 6.25-15 mg/5 mL Syrp; Take 5 mLs by mouth every 6 (six) hours as needed (cough).  Dispense: 180 mL; Refill: 0  - - - benzonatate (TESSALON) 200 MG capsule; Take 1 capsule (200 mg total) by mouth 3 (three) times daily as needed.  Dispense: 30 capsule; Refill: 1  - - - methylPREDNISolone (MEDROL DOSEPACK) 4 mg tablet; use as directed  Dispense: 21 tablet; Refill: 0  - questions answered, warning signs reviewed, patient is comfortable with plan to monitor condition and was encouraged to call the office for any concerns or worsening of condition     - Follow up next week if needed, for follow-up illness.     Tarik Dickerson MD  9/12/2019          Medication List with Changes/Refills   New Medications    BENZONATATE (TESSALON) 200 MG CAPSULE    Take 1 capsule (200 mg total) by mouth 3 (three) times daily as needed.    METHYLPREDNISOLONE (MEDROL DOSEPACK) 4 MG TABLET    use as directed    PROMETHAZINE-DEXTROMETHORPHAN (PROMETHAZINE-DM) 6.25-15 MG/5 ML SYRP    Take 5 mLs by mouth every 6 (six) hours as needed (cough).   Current Medications    ASPIRIN (ECOTRIN) 81 MG EC TABLET    Take 1 tablet (81 mg total) by mouth once daily.    CHOLECALCIFEROL, VITAMIN D3, (VITAMIN D3) 1,000 UNIT CHEW    Take 5 Int'l Units by mouth.    CYCLOBENZAPRINE (FLEXERIL) 10 MG TABLET    as needed.     DICLOFENAC SODIUM (VOLTAREN) 1 % GEL    Apply 2 g topically 4 (four) times daily.    NAPROXEN (NAPROSYN) 500 MG TABLET    Take 1 tablet (500 mg total) by mouth daily as needed.    PRAVASTATIN (PRAVACHOL) 40 MG TABLET    Take 1 tablet (40 mg total) by mouth once daily.

## 2019-09-12 NOTE — LETTER
September 12, 2019      [facility] Family Medicine/ Internal Medicine  [address] [address]  Phone: [phone]  Fax: [phone]       Patient: Jyoti Blue   YOB: 1960  Date of Visit: 09/12/2019    To Whom It May Concern:    Deena Blue  was at [facility] on 09/12/2019. She may return to work/school on 9/16/19 with no restrictions. If you have any questions or concerns, or if I can be of further assistance, please do not hesitate to contact me.    Sincerely,    Abigail Jacob LPN

## 2020-02-29 ENCOUNTER — LAB VISIT (OUTPATIENT)
Dept: LAB | Facility: HOSPITAL | Age: 60
End: 2020-02-29
Attending: FAMILY MEDICINE
Payer: COMMERCIAL

## 2020-02-29 DIAGNOSIS — E55.9 VITAMIN D DEFICIENCY: ICD-10-CM

## 2020-02-29 DIAGNOSIS — R73.03 PREDIABETES: ICD-10-CM

## 2020-02-29 LAB
25(OH)D3+25(OH)D2 SERPL-MCNC: 30 NG/ML (ref 30–96)
ESTIMATED AVG GLUCOSE: 126 MG/DL (ref 68–131)
HBA1C MFR BLD HPLC: 6 % (ref 4–5.6)

## 2020-02-29 PROCEDURE — 82306 VITAMIN D 25 HYDROXY: CPT

## 2020-02-29 PROCEDURE — 83036 HEMOGLOBIN GLYCOSYLATED A1C: CPT

## 2020-02-29 PROCEDURE — 36415 COLL VENOUS BLD VENIPUNCTURE: CPT

## 2020-03-03 ENCOUNTER — OFFICE VISIT (OUTPATIENT)
Dept: INTERNAL MEDICINE | Facility: CLINIC | Age: 60
End: 2020-03-03
Payer: COMMERCIAL

## 2020-03-03 VITALS
BODY MASS INDEX: 33.83 KG/M2 | OXYGEN SATURATION: 100 % | HEART RATE: 81 BPM | SYSTOLIC BLOOD PRESSURE: 118 MMHG | DIASTOLIC BLOOD PRESSURE: 60 MMHG | HEIGHT: 60 IN | TEMPERATURE: 98 F | WEIGHT: 172.31 LBS

## 2020-03-03 DIAGNOSIS — M17.11 PRIMARY OSTEOARTHRITIS OF RIGHT KNEE: ICD-10-CM

## 2020-03-03 DIAGNOSIS — R09.82 PND (POST-NASAL DRIP): ICD-10-CM

## 2020-03-03 DIAGNOSIS — E78.5 HYPERLIPIDEMIA, UNSPECIFIED HYPERLIPIDEMIA TYPE: ICD-10-CM

## 2020-03-03 DIAGNOSIS — R73.03 PREDIABETES: Primary | ICD-10-CM

## 2020-03-03 DIAGNOSIS — I65.23 BILATERAL CAROTID ARTERY STENOSIS: ICD-10-CM

## 2020-03-03 DIAGNOSIS — J30.9 ALLERGIC RHINITIS, UNSPECIFIED SEASONALITY, UNSPECIFIED TRIGGER: ICD-10-CM

## 2020-03-03 PROCEDURE — 99214 PR OFFICE/OUTPT VISIT, EST, LEVL IV, 30-39 MIN: ICD-10-PCS | Mod: S$GLB,,, | Performed by: FAMILY MEDICINE

## 2020-03-03 PROCEDURE — 99999 PR PBB SHADOW E&M-EST. PATIENT-LVL III: ICD-10-PCS | Mod: PBBFAC,,, | Performed by: FAMILY MEDICINE

## 2020-03-03 PROCEDURE — 99214 OFFICE O/P EST MOD 30 MIN: CPT | Mod: S$GLB,,, | Performed by: FAMILY MEDICINE

## 2020-03-03 PROCEDURE — 99999 PR PBB SHADOW E&M-EST. PATIENT-LVL III: CPT | Mod: PBBFAC,,, | Performed by: FAMILY MEDICINE

## 2020-03-03 PROCEDURE — 3008F BODY MASS INDEX DOCD: CPT | Mod: CPTII,S$GLB,, | Performed by: FAMILY MEDICINE

## 2020-03-03 PROCEDURE — 3008F PR BODY MASS INDEX (BMI) DOCUMENTED: ICD-10-PCS | Mod: CPTII,S$GLB,, | Performed by: FAMILY MEDICINE

## 2020-03-03 RX ORDER — NAPROXEN 500 MG/1
500 TABLET ORAL DAILY PRN
Qty: 30 TABLET | Refills: 0 | Status: SHIPPED | OUTPATIENT
Start: 2020-03-03 | End: 2020-11-18 | Stop reason: SDUPTHER

## 2020-03-03 RX ORDER — PRAVASTATIN SODIUM 40 MG/1
40 TABLET ORAL DAILY
Qty: 90 TABLET | Refills: 1 | Status: SHIPPED | OUTPATIENT
Start: 2020-03-03 | End: 2020-08-12 | Stop reason: SDUPTHER

## 2020-03-03 RX ORDER — AZELASTINE 1 MG/ML
1 SPRAY, METERED NASAL 2 TIMES DAILY
Qty: 30 ML | Refills: 0 | Status: SHIPPED | OUTPATIENT
Start: 2020-03-03 | End: 2020-03-25 | Stop reason: SDUPTHER

## 2020-03-03 RX ORDER — FLUTICASONE PROPIONATE 50 MCG
1 SPRAY, SUSPENSION (ML) NASAL DAILY
Qty: 16 G | Refills: 6 | Status: SHIPPED | OUTPATIENT
Start: 2020-03-03 | End: 2020-03-25 | Stop reason: SDUPTHER

## 2020-03-03 NOTE — PROGRESS NOTES
Subjective:       Patient ID: Jyoti Blue is a 59 y.o. female.    Chief Complaint: Annual Exam and scratchy throat    HPI  60 y/o female with HLPD, OA of R knee, Prediabetes is here for follow up.     She has maintained her weight loss.  She limits her carbs, eating smaller portions, water only. She is doing stationary bike for 20 min 3 days a week, she is doing some walking.  She feels good in general, she has a mild sore throat that started on Saturday, she has pnd, sneezing, itchy eyes, she denies sinus pressure/nasal congestion.  She is taking otc allergy med that helps some.  She denies f/n/v/d/constipation/cp/sob/urinary sx. Sleeping well.      ENT Dr. Terrell, gets cerumen impaction and has to get her ears cleaned every 6 months  R knee pain on and off for 10 years: pain is achy and comes and goes, she denies knee swelling, she has been to ortho and had multiple injections, she repots taking Naproxen 1-2 days a month helps and diclofenac helps some  GYN: followed by Dr. Quita Bess, mmg utd DIS and pelvic utd, reports was done in 11/2019  Prediabetes: a1c 6.0 2/2020, discussed metformin, plans to try diet and exercise for 3 months  Vitamin D def: Vitamin D3 2,000 IU daily gummies  HLPD, Mild carotid disease: repeat Carotid US in 2024; asa 81 mg daily,  Pravastatin 40 mg daily  OTC: asa 81 mg daily  Colonoscopy done in 2011 was normal  Eye exam due  Dental utd  Vaccines: utd    Review of Systems   Constitutional: Negative for activity change, appetite change, fatigue and fever.   HENT: Positive for postnasal drip, rhinorrhea, sneezing and sore throat.    Eyes: Positive for itching.   Respiratory: Negative for cough and shortness of breath.    Cardiovascular: Negative for chest pain, palpitations and leg swelling.   Gastrointestinal: Negative for constipation, diarrhea, nausea and vomiting.   Genitourinary: Negative for difficulty urinating and dysuria.   Skin: Negative for rash.   Neurological: Negative  for dizziness and light-headedness.   Psychiatric/Behavioral: Negative for sleep disturbance.       Objective:      /60 (BP Location: Left arm, Patient Position: Sitting, BP Method: Large (Manual))   Pulse 81   Temp 98.2 °F (36.8 °C) (Oral)   Ht 5' (1.524 m)   Wt 78.1 kg (172 lb 4.6 oz)   SpO2 100%   BMI 33.65 kg/m²   Physical Exam   Constitutional: She appears well-developed and well-nourished.   HENT:   Head: Normocephalic and atraumatic.   Mouth/Throat: No oropharyngeal exudate.   Mild posterior pharyngeal erythema  Inflamed nasal turbinates bilaterally  L TM cerumen impaction   Neck: Normal range of motion. Neck supple. No thyromegaly present.   Cardiovascular: Normal rate, regular rhythm and normal heart sounds.   Pulmonary/Chest: Effort normal and breath sounds normal. No respiratory distress.   Musculoskeletal: She exhibits no edema.   Lymphadenopathy:     She has no cervical adenopathy.   Neurological: She is alert.   Skin: Skin is warm and dry.   Psychiatric: She has a normal mood and affect.   Nursing note and vitals reviewed.      Assessment:       1. Prediabetes    2. Primary osteoarthritis of right knee    3. Hyperlipidemia, unspecified hyperlipidemia type    4. Bilateral carotid artery stenosis    5. Allergic rhinitis, unspecified seasonality, unspecified trigger    6. PND (post-nasal drip)        Plan:   Jyoti was seen today for annual exam and scratchy throat.    Diagnoses and all orders for this visit:    Prediabetes  -     Hemoglobin A1c; Future    Primary osteoarthritis of right knee  -     naproxen (NAPROSYN) 500 MG tablet; Take 1 tablet (500 mg total) by mouth daily as needed.    Hyperlipidemia, unspecified hyperlipidemia type  -     pravastatin (PRAVACHOL) 40 MG tablet; Take 1 tablet (40 mg total) by mouth once daily.    Bilateral carotid artery stenosis  -     pravastatin (PRAVACHOL) 40 MG tablet; Take 1 tablet (40 mg total) by mouth once daily.    Allergic rhinitis, unspecified  seasonality, unspecified trigger  -     fluticasone propionate (FLONASE) 50 mcg/actuation nasal spray; 1 spray (50 mcg total) by Each Nostril route once daily.  -     azelastine (ASTELIN) 137 mcg (0.1 %) nasal spray; 1 spray (137 mcg total) by Nasal route 2 (two) times daily.    PND (post-nasal drip)  -     fluticasone propionate (FLONASE) 50 mcg/actuation nasal spray; 1 spray (50 mcg total) by Each Nostril route once daily.  -     azelastine (ASTELIN) 137 mcg (0.1 %) nasal spray; 1 spray (137 mcg total) by Nasal route 2 (two) times daily.

## 2020-03-24 ENCOUNTER — TELEPHONE (OUTPATIENT)
Dept: INTERNAL MEDICINE | Facility: CLINIC | Age: 60
End: 2020-03-24

## 2020-03-24 NOTE — TELEPHONE ENCOUNTER
----- Message from Cee Durand sent at 3/24/2020  3:43 PM CDT -----  Contact: MARIBEL DEVI [[id number]]    Type:  Patient Returning Call    Who Called: MARIBEL DEVI [[id number]]    Who Left Message for Patient: Jordyn     Does the patient know what this is regarding?: Y    Can the clinic reply in [facility]: No    Best Call Back Number: [phone]    Additional Information: N/A

## 2020-03-24 NOTE — TELEPHONE ENCOUNTER
----- Message from Perla Sanchez sent at 3/24/2020  1:55 PM CDT -----  Contact: MARIBEL DEVI  Name of Who is Calling: MARIBEL DEVI      What is the request in detail: Would like to speak to staff in regards to having a sinus infection and is requesting penicillin to treat it. Please advise.       Can the clinic reply by [facility]: No      What Number [facility] Back [facility]: [phone]

## 2020-03-25 ENCOUNTER — OFFICE VISIT (OUTPATIENT)
Dept: INTERNAL MEDICINE | Facility: CLINIC | Age: 60
End: 2020-03-25
Payer: COMMERCIAL

## 2020-03-25 VITALS
SYSTOLIC BLOOD PRESSURE: 138 MMHG | HEART RATE: 68 BPM | BODY MASS INDEX: 33.58 KG/M2 | OXYGEN SATURATION: 98 % | DIASTOLIC BLOOD PRESSURE: 70 MMHG | HEIGHT: 60 IN | WEIGHT: 171.06 LBS | TEMPERATURE: 98 F

## 2020-03-25 DIAGNOSIS — R09.82 PND (POST-NASAL DRIP): ICD-10-CM

## 2020-03-25 DIAGNOSIS — J30.9 ALLERGIC RHINITIS, UNSPECIFIED SEASONALITY, UNSPECIFIED TRIGGER: ICD-10-CM

## 2020-03-25 PROCEDURE — 96372 PR INJECTION,THERAP/PROPH/DIAG2ST, IM OR SUBCUT: ICD-10-PCS | Mod: S$GLB,,, | Performed by: FAMILY MEDICINE

## 2020-03-25 PROCEDURE — 99213 OFFICE O/P EST LOW 20 MIN: CPT | Mod: 25,S$GLB,, | Performed by: FAMILY MEDICINE

## 2020-03-25 PROCEDURE — 3008F BODY MASS INDEX DOCD: CPT | Mod: CPTII,S$GLB,, | Performed by: FAMILY MEDICINE

## 2020-03-25 PROCEDURE — 99213 PR OFFICE/OUTPT VISIT, EST, LEVL III, 20-29 MIN: ICD-10-PCS | Mod: 25,S$GLB,, | Performed by: FAMILY MEDICINE

## 2020-03-25 PROCEDURE — 3008F PR BODY MASS INDEX (BMI) DOCUMENTED: ICD-10-PCS | Mod: CPTII,S$GLB,, | Performed by: FAMILY MEDICINE

## 2020-03-25 PROCEDURE — 99999 PR PBB SHADOW E&M-EST. PATIENT-LVL III: ICD-10-PCS | Mod: PBBFAC,,, | Performed by: FAMILY MEDICINE

## 2020-03-25 PROCEDURE — 99999 PR PBB SHADOW E&M-EST. PATIENT-LVL III: CPT | Mod: PBBFAC,,, | Performed by: FAMILY MEDICINE

## 2020-03-25 PROCEDURE — 96372 THER/PROPH/DIAG INJ SC/IM: CPT | Mod: S$GLB,,, | Performed by: FAMILY MEDICINE

## 2020-03-25 RX ORDER — FLUTICASONE PROPIONATE 50 MCG
1 SPRAY, SUSPENSION (ML) NASAL DAILY
Qty: 48 G | Refills: 1 | Status: SHIPPED | OUTPATIENT
Start: 2020-03-25 | End: 2022-02-03

## 2020-03-25 RX ORDER — TRIAMCINOLONE ACETONIDE 40 MG/ML
40 INJECTION, SUSPENSION INTRA-ARTICULAR; INTRAMUSCULAR
Status: COMPLETED | OUTPATIENT
Start: 2020-03-25 | End: 2020-03-25

## 2020-03-25 RX ORDER — AZELASTINE 1 MG/ML
1 SPRAY, METERED NASAL 2 TIMES DAILY
Qty: 30 ML | Refills: 1 | Status: SHIPPED | OUTPATIENT
Start: 2020-03-25

## 2020-03-25 RX ADMIN — TRIAMCINOLONE ACETONIDE 40 MG: 40 INJECTION, SUSPENSION INTRA-ARTICULAR; INTRAMUSCULAR at 11:03

## 2020-03-25 NOTE — PROGRESS NOTES
[facility]  [facility] Family Medicine/ Internal Medicine  Clinic Note      Subjective:       Patient ID: Jyoti Blue is a 59 y.o. female.    Chief Complaint: Allergies and Sinusitis    Patient is here today for acute visit.  She notes onset of upper respiratory symptoms yesterday, which she attributes to seasonal allergies.  She describes excessive amounts of pollen in the air and deposited around her house recently.  She has history of allergic rhinitis, but not for recurrent sinus infections.  She denies any fever.  She has a mild cough, which she feels is triggered by post-nasal drip.  No headache or sinus pain/pressure.  She has been using an antihistamine with only partial relief.    URI    This is a new problem. The current episode started yesterday. The problem has been gradually worsening. There has been no fever. Associated symptoms include congestion, coughing (from post-nasal drip), rhinorrhea and a sore throat. Pertinent negatives include no abdominal pain, chest pain, diarrhea, headaches, joint pain, nausea, sinus pain, sneezing, vomiting or wheezing. She has tried antihistamine for the symptoms. The treatment provided mild relief.     Review of Systems   Constitutional: Negative for chills, fatigue and fever.   HENT: Positive for congestion, postnasal drip, rhinorrhea and sore throat. Negative for sinus pain and sneezing.    Respiratory: Positive for cough (from post-nasal drip). Negative for chest tightness, shortness of breath and wheezing.    Cardiovascular: Negative for chest pain.   Gastrointestinal: Negative for abdominal pain, diarrhea, nausea and vomiting.   Musculoskeletal: Negative for joint pain.   Neurological: Negative for light-headedness and headaches.       Objective:      /70 (BP Location: Left arm, Patient Position: Sitting, BP Method: Large (Manual))   Pulse 68   Temp 98.2 °F (36.8 °C)   Ht 5' (1.524 m)   Wt 77.6 kg (171 lb 1.2 oz)   SpO2 98%    BMI 33.41 kg/m²   Physical Exam   Constitutional: She is oriented to person, place, and time. She appears well-developed and well-nourished. No distress.   HENT:   Head: Normocephalic and atraumatic.   Right Ear: Tympanic membrane and ear canal normal. Tympanic membrane is not erythematous and not retracted. No middle ear effusion.   Left Ear: Tympanic membrane and ear canal normal. Tympanic membrane is not erythematous and not retracted.  No middle ear effusion.   Nose: Rhinorrhea present. No mucosal edema. Right sinus exhibits no maxillary sinus tenderness and no frontal sinus tenderness. Left sinus exhibits no maxillary sinus tenderness and no frontal sinus tenderness.   Mouth/Throat: Mucous membranes are normal. Posterior oropharyngeal erythema present. No posterior oropharyngeal edema.   Neck: Normal range of motion.   Cardiovascular: Normal rate and regular rhythm.   No murmur heard.  Pulmonary/Chest: Effort normal and breath sounds normal. No tachypnea. No respiratory distress. She has no wheezes. She has no rhonchi. She has no rales.   Abdominal: Soft. Bowel sounds are normal. She exhibits no distension. There is no tenderness.   Musculoskeletal: Normal range of motion.   Lymphadenopathy:     She has no cervical adenopathy.   Neurological: She is alert and oriented to person, place, and time.   Skin: Skin is warm and dry. No rash noted.   Psychiatric: She has a normal mood and affect.   Vitals reviewed.      Assessment:       1. Allergic rhinitis, unspecified seasonality, unspecified trigger    2. PND (post-nasal drip)        Plan:     1. Allergic rhinitis, unspecified seasonality, unspecified trigger  2. PND (post-nasal drip)  - history and exam findings reviewed, generally reassuring  - differential reviewed, presentation is most suggestive of allergic rhinitis versus early acute URI  - - patient is afebrile, testing for COVID-19 was considered but patient is not currently a candidate, patient expressed  understanding  - supportive care measures reviewed  - treatment options reviewed, will provide with a steroid dose IM to help relieve her symptoms more acutely, and have advised to restart therapy with daily Flonase and azelastine BID PRN, dosing instructions and potential side effects reviewed, handout provided, patient expressed understanding, notes has tolerated steroid doses IM quite well in the past and they were helpful  - - triamcinolone acetonide injection 40 mg   - - fluticasone propionate (FLONASE) 50 mcg/actuation nasal spray; 1 spray (50 mcg total) by Each Nostril route once daily.  Dispense: 48 g; Refill: 1  - - azelastine (ASTELIN) 137 mcg (0.1 %) nasal spray; 1 spray (137 mcg total) by Nasal route 2 (two) times daily.  Dispense: 30 mL; Refill: 1  - questions answered, warning signs reviewed, patient is comfortable with this plan and was encouraged to call the office for any concerns or worsening of condition     - Follow up next weekend if needed, for follow-up allergies.     Tarik Dickerson MD  3/25/2020          Medication List with Changes/Refills   Current Medications    ASPIRIN (ECOTRIN) 81 MG EC TABLET    Take 1 tablet (81 mg total) by mouth once daily.    CHOLECALCIFEROL, VITAMIN D3, (VITAMIN D3) 1,000 UNIT CHEW    Take 5 Int'l Units by mouth.    CYCLOBENZAPRINE (FLEXERIL) 10 MG TABLET    as needed.     DICLOFENAC SODIUM (VOLTAREN) 1 % GEL    Apply 2 g topically 4 (four) times daily.    NAPROXEN (NAPROSYN) 500 MG TABLET    Take 1 tablet (500 mg total) by mouth daily as needed.    PRAVASTATIN (PRAVACHOL) 40 MG TABLET    Take 1 tablet (40 mg total) by mouth once daily.   Changed and/or Refilled Medications    Modified Medication Previous Medication    AZELASTINE (ASTELIN) 137 MCG (0.1 %) NASAL SPRAY azelastine (ASTELIN) 137 mcg (0.1 %) nasal spray       1 spray (137 mcg total) by Nasal route 2 (two) times daily.    1 spray (137 mcg total) by Nasal route 2 (two) times daily.    FLUTICASONE  PROPIONATE (FLONASE) 50 MCG/ACTUATION NASAL SPRAY fluticasone propionate (FLONASE) 50 mcg/actuation nasal spray       1 spray (50 mcg total) by Each Nostril route once daily.    1 spray (50 mcg total) by Each Nostril route once daily.

## 2020-03-25 NOTE — PROGRESS NOTES
After obtaining consent, and per orders of Dr. JORDAN, injection of TRIAMCINOLONE Lot [id number] 10/31/2021 given in the [facility] by KANE LEBLANC. Patient tolerated well and band aid applied. Patient instructed to remain in clinic for 15 minutes afterwards, and to report any adverse reaction to me immediately.

## 2020-03-25 NOTE — PATIENT INSTRUCTIONS
Step-by-Step  Using Nasal Spray    Date Last Reviewed: 5/27/2015  © 5189-9127 [employer]. [address] [place] [address], [place] [address]. All rights reserved. This information is not intended as a substitute for professional medical care. Always follow your healthcare professional's instructions.

## 2020-06-09 ENCOUNTER — LAB VISIT (OUTPATIENT)
Dept: LAB | Facility: HOSPITAL | Age: 60
End: 2020-06-09
Attending: FAMILY MEDICINE
Payer: COMMERCIAL

## 2020-06-09 DIAGNOSIS — R73.03 PREDIABETES: ICD-10-CM

## 2020-06-09 LAB
ESTIMATED AVG GLUCOSE: 117 MG/DL (ref 68–131)
HBA1C MFR BLD HPLC: 5.7 % (ref 4–5.6)

## 2020-06-09 PROCEDURE — 83036 HEMOGLOBIN GLYCOSYLATED A1C: CPT

## 2020-06-09 PROCEDURE — 36415 COLL VENOUS BLD VENIPUNCTURE: CPT

## 2020-06-11 ENCOUNTER — TELEPHONE (OUTPATIENT)
Dept: INTERNAL MEDICINE | Facility: CLINIC | Age: 60
End: 2020-06-11

## 2020-06-11 NOTE — TELEPHONE ENCOUNTER
Please let know that her A1c is still in the prediabetic range but did improve since her previous check.  Please offer to send her a copy of her labs.

## 2020-06-11 NOTE — TELEPHONE ENCOUNTER
Spoke with the pt and informed her of her lab results. Pt verbalized understanding. Mailed the pt a copy of her labs.

## 2020-06-15 ENCOUNTER — TELEPHONE (OUTPATIENT)
Dept: INTERNAL MEDICINE | Facility: CLINIC | Age: 60
End: 2020-06-15

## 2020-06-15 NOTE — TELEPHONE ENCOUNTER
----- Message from María Kerr sent at 6/15/2020  4:41 PM CDT -----  Regarding: self [phone]  Patient is returning a phone call.  Who left a message for the patient: Jordyn Cabezas MA  Does patient know what this is regarding:  referral for physical therapy  Comments:

## 2020-06-15 NOTE — TELEPHONE ENCOUNTER
----- Message from María Kerr sent at 6/15/2020  1:37 PM CDT -----  Regarding: self [phone]  @Patient would like to get a referral.  Referral to what specialty:  Physical therapy  Does the patient want the referral with a specific physician:  in the [place] area  [facility]  Reason (be specific):  N/A  Does the patient already have the specialty clinic appointment scheduled:  no  If yes, what date is the appointment scheduled:     Is the insurance listed in Epic correct? (this is important for a referral):  yes  Comments:

## 2020-06-16 ENCOUNTER — OFFICE VISIT (OUTPATIENT)
Dept: INTERNAL MEDICINE | Facility: CLINIC | Age: 60
End: 2020-06-16
Payer: COMMERCIAL

## 2020-06-16 DIAGNOSIS — G89.29 CHRONIC MIDLINE LOW BACK PAIN WITH RIGHT-SIDED SCIATICA: Primary | ICD-10-CM

## 2020-06-16 DIAGNOSIS — M54.41 CHRONIC MIDLINE LOW BACK PAIN WITH RIGHT-SIDED SCIATICA: Primary | ICD-10-CM

## 2020-06-16 PROCEDURE — 99213 PR OFFICE/OUTPT VISIT, EST, LEVL III, 20-29 MIN: ICD-10-PCS | Mod: 95,,, | Performed by: FAMILY MEDICINE

## 2020-06-16 PROCEDURE — 99213 OFFICE O/P EST LOW 20 MIN: CPT | Mod: 95,,, | Performed by: FAMILY MEDICINE

## 2020-06-16 NOTE — PROGRESS NOTES
[facility]  [facility]/ Internal Medicine  Clinic Note      Subjective:       Patient ID: Jyoti Blue is a 59 y.o. female.    Chief Complaint: No chief complaint on file.    12/2015 tripped and fell, no fracture    Back Pain  This is a chronic problem. The current episode started more than 1 year ago. The problem occurs constantly. The problem is unchanged. The pain is present in the lumbar spine and sacro-iliac (to the right of the lumbar spine). The quality of the pain is described as aching and shooting. The pain radiates to the right foot and right thigh. The pain is at a severity of 7/10. The pain is moderate. The pain is the same all the time. The symptoms are aggravated by bending, position, lying down, sitting and twisting. Stiffness is present all day. Associated symptoms include leg pain, numbness, paresthesias and tingling. Pertinent negatives include no abdominal pain, bladder incontinence, bowel incontinence, chest pain, dysuria, fever, headaches, paresis, pelvic pain, perianal numbness, weakness or weight loss. She has tried NSAIDs and muscle relaxant for the symptoms. The treatment provided mild relief.     Review of Systems   Constitutional: Negative for fever and weight loss.   Cardiovascular: Negative for chest pain.   Gastrointestinal: Negative for abdominal pain and bowel incontinence.   Genitourinary: Negative for bladder incontinence, dysuria, hematuria and pelvic pain.   Musculoskeletal: Positive for back pain.   Neurological: Positive for tingling, numbness and paresthesias. Negative for weakness and headaches.       Objective:      There were no vitals taken for this visit.  Physical Exam  Constitutional:       General: She is not in acute distress.     Appearance: She is well-developed. She is not ill-appearing or toxic-appearing.      Comments: Limited exam and vitals today due to virtual visit   Eyes:      Conjunctiva/sclera: Conjunctivae normal.    Pulmonary:      Effort: Pulmonary effort is normal. No respiratory distress.   Neurological:      Mental Status: She is alert and oriented to person, place, and time.   Psychiatric:         Speech: Speech normal.         Behavior: Behavior normal.         Thought Content: Thought content normal.         Assessment:       1. Chronic midline low back pain with right-sided sciatica        Plan:     1. Chronic midline low back pain with right-sided sciatica  - history reviewed, with chronic and recurrent back pain, would like referral to physical therapy  - - Ambulatory referral/consult to Physical/Occupational Therapy; Future   - supportive care measures reviewed  - treatment options reviewed, OK to continue Flexeril and NSAID as needed, dosing instructions reviewed  - questions answered, warning signs reviewed, patient is comfortable with this plan and was encouraged to call the office for any concerns or worsening of condition     The patient location is: at home in [place]  The chief complaint leading to consultation is: referral for PT    Visit type: audiovisual    Face to Face time with patient: 12 minutes of total time spent on the encounter, which includes face to face time and non-face to face time preparing to see the patient (eg, review of tests), Obtaining and/or reviewing separately obtained history, Documenting clinical information in the electronic or other health record, Independently interpreting results (not separately reported) and communicating results to the patient/family/caregiver, or Care coordination (not separately reported).         Each patient to whom he or she provides medical services by telemedicine is:  (1) informed of the relationship between the physician and patient and the respective role of any other health care provider with respect to management of the patient; and (2) notified that he or she may decline to receive medical services by telemedicine and may withdraw from such care at  any time.    Notes:     - Follow up in about 4 weeks (around 7/14/2020) for follow-up after physical therapy.     Tarik Dickerson MD  6/17/2020          Medication List with Changes/Refills   Current Medications    ASPIRIN (ECOTRIN) 81 MG EC TABLET    Take 1 tablet (81 mg total) by mouth once daily.    AZELASTINE (ASTELIN) 137 MCG (0.1 %) NASAL SPRAY    1 spray (137 mcg total) by Nasal route 2 (two) times daily.    CHOLECALCIFEROL, VITAMIN D3, (VITAMIN D3) 1,000 UNIT CHEW    Take 5 Int'l Units by mouth.    CYCLOBENZAPRINE (FLEXERIL) 10 MG TABLET    as needed.     DICLOFENAC SODIUM (VOLTAREN) 1 % GEL    Apply 2 g topically 4 (four) times daily.    FLUTICASONE PROPIONATE (FLONASE) 50 MCG/ACTUATION NASAL SPRAY    1 spray (50 mcg total) by Each Nostril route once daily.    NAPROXEN (NAPROSYN) 500 MG TABLET    Take 1 tablet (500 mg total) by mouth daily as needed.    PRAVASTATIN (PRAVACHOL) 40 MG TABLET    Take 1 tablet (40 mg total) by mouth once daily.

## 2020-06-23 ENCOUNTER — CLINICAL SUPPORT (OUTPATIENT)
Dept: REHABILITATION | Facility: HOSPITAL | Age: 60
End: 2020-06-23
Attending: FAMILY MEDICINE
Payer: COMMERCIAL

## 2020-06-23 DIAGNOSIS — G89.29 CHRONIC MIDLINE LOW BACK PAIN WITH RIGHT-SIDED SCIATICA: ICD-10-CM

## 2020-06-23 DIAGNOSIS — M54.41 CHRONIC MIDLINE LOW BACK PAIN WITH RIGHT-SIDED SCIATICA: ICD-10-CM

## 2020-06-23 DIAGNOSIS — M54.50 LUMBAR PAIN: ICD-10-CM

## 2020-06-23 PROCEDURE — 97110 THERAPEUTIC EXERCISES: CPT | Mod: PN

## 2020-06-23 PROCEDURE — 97161 PT EVAL LOW COMPLEX 20 MIN: CPT | Mod: PN

## 2020-06-23 NOTE — PROGRESS NOTES
"[facility]  Physical Therapy Initial Evaluation    Name: Jyoti Blue  Clinic Number: [id number]    Therapy Diagnosis:  Chronic LBP     Physician: Tarik Dickerson MD    Physician Orders: PT Eval and Treat   Medical Diagnosis from Referral: Chronic midline low back pn  Evaluation Date: 6/23/2020  Authorization Period Expiration: 6/30/20  Plan of Care Expiration: ***  Visit # / Visits authorized: 1/ 1    Time In: ***  Time Out: ***  Total Billable Time: *** minutes    Precautions: {IP WOUND PRECAUTIONS OHS:86185}    Subjective   Date of onset: ***  History of current condition - Jyoti reports: ***     Medical History:   Past Medical History:   Diagnosis Date    Allergy     Anemia     Hyperlipidemia        Surgical History:   Jyoti Blue  has a past surgical history that includes ovary removed; Ovary surgery; Dilation and curettage of uterus; and Breast surgery.    Medications:   Jyoti has a current medication list which includes the following prescription(s): aspirin, azelastine, cholecalciferol (vitamin d3), cyclobenzaprine, diclofenac sodium, fluticasone propionate, naproxen, and pravastatin.    Allergies:   Review of patient's allergies indicates:  No Known Allergies     Imaging, {Mri/ctscan/bone scan:35180}: ***    Prior Therapy: ***  Social History: *** {LIVES WITH:11686}  Occupation: ***  Prior Level of Function: ***  Current Level of Function: ***    Pain:  Current {0-10:31657::"0"}/10, worst {0-10:20507::"0"}/10, best {0-10:73480::"0"}/10   Location: {RIGHT LEFT BILATERAL:64947} {LOCATION ON BODY:19015}  Description: {Pain Description:92716}  Aggravating Factors: {Causes; Pain:06588}  Easing Factors: {Pain (activities that relieve):08243}    Pts goals: ***    Objective     Posture: ***  Palpation: ***  Sensation: ***  DTRs: ***    Lumbar AROM: ROM-   Response to repeated movements   Flexion *** degrees - ***   Extension *** degrees - ***   Right side " "bending *** degrees   Left side bending *** degrees     L/E MMT Left Right   Hip Flexion {AMB PT VESTIBULAR STRENGTH:85934} {AMB PT VESTIBULAR STRENGTH:57824}   Hip Extension {AMB PT VESTIBULAR STRENGTH:16957} {AMB PT VESTIBULAR STRENGTH:03874}   Hip Abduction {AMB PT VESTIBULAR STRENGTH:84685} {AMB PT VESTIBULAR STRENGTH:89929}   Hip Adduction {AMB PT VESTIBULAR STRENGTH:88956} {AMB PT VESTIBULAR STRENGTH:99607}   Hip IR {AMB PT VESTIBULAR STRENGTH:65902} {AMB PT VESTIBULAR STRENGTH:26427}   Hip ER {AMB PT VESTIBULAR STRENGTH:00674} {AMB PT VESTIBULAR STRENGTH:28811}   Knee Flexion {AMB PT VESTIBULAR STRENGTH:60577} {AMB PT VESTIBULAR STRENGTH:74257}   Knee Extension {AMB PT VESTIBULAR STRENGTH:45321} {AMB PT VESTIBULAR STRENGTH:95344}   Ankle DF {AMB PT VESTIBULAR STRENGTH:11575} {AMB PT VESTIBULAR STRENGTH:35190}   Ankle PF {AMB PT VESTIBULAR STRENGTH:89816} {AMB PT VESTIBULAR STRENGTH:64630}   Ankle Inversion {AMB PT VESTIBULAR STRENGTH:91027} {AMB PT VESTIBULAR STRENGTH:22838}   Ankle Eversion {AMB PT VESTIBULAR STRENGTH:23852} {AMB PT VESTIBULAR STRENGTH:54367}   Big Toe Extension {AMB PT VESTIBULAR STRENGTH:09731} {AMB PT VESTIBULAR STRENGTH:50774}       Flexibility Left Right   Hamstrings *** degrees *** degrees   Achilles *** degrees *** degrees       Special Tests Left Right   SLR *** ***   JESS *** ***   Piriformis *** ***   Slump *** ***    *** ***    *** ***       Gait {AD:98448}   Analysis ***       Other:     CMS Impairment/Limitation/Restriction for FOTO *** Survey    Therapist reviewed FOTO scores for Jyoti Blue on 6/23/2020.   FOTO documents entered into EPIC - see Media section.    Limitation Score: ***%  Category: {Blank single:32522::"Other","Self Care","Body Position","Carrying","Mobility"}    Current : {G Codes:41960}  Goal: {G Codes:32624}  Discharge: {G Codes:60066}         TREATMENT   Treatment Time In: ***  Treatment Time Out: ***  Total Treatment time separate from Evaluation: " "*** minutes    Jyoti received therapeutic exercises to develop {AMB PT PROGRESS OBJECTIVE:29873} for *** minutes including:  ***    Jyoti received the following manual therapy techniques: {AMB PT PROGRESS MANUAL THERAPY:65919} were applied to the: *** for *** minutes, including:  ***    Jyoti participated in neuromuscular re-education activities to improve: {AMB PT PROGRESS NEURO RE-ED:59717} for *** minutes. The following activities were included:  ***    Jyoti participated in dynamic functional therapeutic activities to improve functional performance for ***  minutes, including:  ***    Jyoti participated in gait training to improve functional mobility and safety for ***  minutes, including:  ***    Jyoti received the following direct contact modalities after being cleared for contraindications: {AMB PT PROGRESS DIRECT CONTACT MODES:80059}    Jyoti received the following supervised modalities after being cleared for contradictions: {AMB PT SUPERVISED MODES:79619}    Jyoti received hot pack for *** minutes to ***.    Jyoti received cold pack for *** minutes to ***.    Home Exercises and Patient Education Provided    Education provided:   - ***    Written Home Exercises Provided: {Blank single:92271::"yes","Patient instructed to cont prior HEP"}.  Exercises were reviewed and Jyoti was able to demonstrate them prior to the end of the session.  Jyoti demonstrated {Desc; good/fair/poor:19326} understanding of the education provided.     See EMR under {Blank single:85707::"Media","Patient Instructions"} for exercises provided {Blank single:76005::"6/23/2020","prior visit"}.    Assessment   Jyoti is a 59 y.o. female referred to outpatient Physical Therapy with a medical diagnosis of ***. Pt presents with ***    Pt prognosis is {REHAB PROGNOSIS OHS:24301}.   Pt will benefit from skilled outpatient Physical Therapy to address the deficits stated above and in the chart below, provide pt/family education, and to " "maximize pt's level of independence.     Plan of care discussed with patient: {YES:30109}  Pt's spiritual, cultural and educational needs considered and patient is agreeable to the plan of care and goals as stated below:     Anticipated Barriers for therapy: ***    Medical Necessity is demonstrated by the following  History  Co-morbidities and personal factors that may impact the plan of care Co-morbidities:   {Co-morbidities:11874}    Personal Factors:   {Personal Factors:62905}     {Desc; low/moderate/high:328856}   Examination  Body Structures and Functions, activity limitations and participation restrictions that may impact the plan of care Body Regions:   {Body Regions:37214}    Body Systems:    {Body Systems:63153}    Participation Restrictions:   ***    Activity limitations:   Learning and applying knowledge  {Learning and applying knowledge:88782}    General Tasks and Commands  {Gen tasks and commands:11424}    Communication  {Communication:40772}    Mobility  {Mobility:92609}    Self care  {Self Care:63304}    Domestic Life  {Domestic Life:64275}    Interactions/Relationships  {Interactions/Relationships:11166}    Life Areas  {Life Areas:16144}    Community and Social Life  {Community/Social Life:67618}         {Desc; low/moderate/high:000764}   Clinical Presentation {Clinical Presentation :73863} {Desc; low/moderate/high:431134}   Decision Making/ Complexity Score: {Desc; low/moderate/high:850447}     Goals:  Short Term Goals: *** weeks   ***    Long Term Goals: *** weeks   ***    Plan   Plan of care Certification: 6/23/2020 to ***.    Outpatient Physical Therapy {NUMBERS 1-5:67332} times weekly for {0-10:58692::"0"} weeks to include the following interventions: {TX PLAN:51632}.     Alison Abebe, PT    "

## 2020-06-24 PROBLEM — M54.50 LUMBAR PAIN: Status: ACTIVE | Noted: 2020-06-24

## 2020-06-25 NOTE — PROGRESS NOTES
Physical Therapy Treatment Note     Name: Jyoti Lott [place]  Clinic Number: [id number]    Therapy Diagnosis:   Encounter Diagnosis   Name Primary?    Lumbar pain      Physician: Tarik Dickerson MD    Visit Date: 6/26/2020    Physician Orders: PT Eval and Treat   Medical Diagnosis from Referral: M54.41,G89.29 (ICD-10-CM) - Chronic midline low back pain with right-sided sciatica  Evaluation Date: 6/23/2020  Authorization Period Expiration: 07/31/2020  Plan of Care Expiration: 9/22/2020 or 12th Visit  Visit # / Visits authorized: 1/12 (2 total)      Time In: 11:00 AM  Time Out: 11:45 AM  Total Billable Time: 45 minutes     Precautions: Standard and Fall    Subjective     Pt reports: she feels good. Reports questions about HEP. Reports piriformis stretch hurt her neck and confused on PPT.   She was compliant with home exercise program.  Response to previous treatment: no adverse effects   Functional change: none     Pain: 6-7/10  Location: bilateral low back       Objective     Jyoti received therapeutic exercises to develop strength, ROM and flexibility for 45 minutes including:    Recumbent Bike x 8 minutes   Single knee to chest x 20 each   Piriformis stretch 3x30   LTR x20   PPT x 20   Sciatic nerve glide - NP  Ball squeeze x 20   Supine clams GTB x 20  SLR 2x10   Long sitting HSS 1x30 each      Home Exercises Provided and Patient Education Provided     Education provided:   - continue HEP    Written Home Exercises Provided: Patient instructed to cont prior HEP.  Exercises were reviewed and Jyoti was able to demonstrate them prior to the end of the session.  Jyoti demonstrated good  understanding of the education provided.     See EMR under Patient Instructions for exercises provided prior visit.    Assessment     Patient with good tolerance to initial treatment post eval with no onset of adverse effects. Reviewed piriformis stretch with patient and discussed proper form, not lifting head and relaxing  shoulders and patient with no reports of neck pain.   Jyoti is progressing well towards her goals.   Pt prognosis is Good.     Pt will continue to benefit from skilled outpatient physical therapy to address the deficits listed in the problem list box on initial evaluation, provide pt/family education and to maximize pt's level of independence in the home and community environment.     Pt's spiritual, cultural and educational needs considered and pt agreeable to plan of care and goals.     Anticipated barriers to physical therapy: chronic condition     Goals:        Long Term Goals: 6 weeks      Goal       Status     1. Pt. to report decreased lumbar pain </ =  2/10 at worst to increase tolerance for upright unsupported sitting posture.     2. Pt. to demonstrate proper posture requiring minimum verbal cues from PT for improved posture positioning      3. Increase lumbar side bending  AROM to 40 degrees to promote greater ease with self-care.     4. Increase lumbar rotation AROM to 45 degrees to promote greater ease with driving.     5. Pt. to demonstrate increased MMT for rectus abdominus to 4/5 to improve supine to sitting transfer.     6. Pt. to demonstrate increased MMT for Lumbar extensor paraspinals to 4/5 to improve tolerance for ADL and work activities.      7. Pt to be independent with HEP to improve ROM and independence with ADL's            Plan     Continue PT POC.     Divya Valentine, PTA

## 2020-06-26 ENCOUNTER — CLINICAL SUPPORT (OUTPATIENT)
Dept: REHABILITATION | Facility: HOSPITAL | Age: 60
End: 2020-06-26
Attending: FAMILY MEDICINE
Payer: COMMERCIAL

## 2020-06-26 DIAGNOSIS — M54.50 LUMBAR PAIN: ICD-10-CM

## 2020-06-26 PROCEDURE — 97110 THERAPEUTIC EXERCISES: CPT | Mod: PN,CQ

## 2020-06-30 ENCOUNTER — CLINICAL SUPPORT (OUTPATIENT)
Dept: REHABILITATION | Facility: HOSPITAL | Age: 60
End: 2020-06-30
Attending: FAMILY MEDICINE
Payer: COMMERCIAL

## 2020-06-30 DIAGNOSIS — M54.50 LUMBAR PAIN: ICD-10-CM

## 2020-06-30 PROCEDURE — 97110 THERAPEUTIC EXERCISES: CPT | Mod: PN

## 2020-06-30 NOTE — PROGRESS NOTES
"    Physical Therapy Treatment Note     Name: Jyoti Lott [place]  Clinic Number: [id number]    Therapy Diagnosis:   Encounter Diagnosis   Name Primary?    Lumbar pain      Physician: Tarik Dickerson MD    Visit Date: 6/30/2020    Physician Orders: PT Eval and Treat   Medical Diagnosis from Referral: M54.41,G89.29 (ICD-10-CM) - Chronic midline low back pain with right-sided sciatica  Evaluation Date: 6/23/2020  Authorization Period Expiration: 07/31/2020  Plan of Care Expiration: 9/22/2020 or 12th Visit  Visit # / Visits authorized: 2/12      Time In: 11:00 AM  Time Out: 11:45 AM  Total Billable Time: 45 minutes     Precautions: Standard and Fall    Subjective     Pt reports: I feel like my exercises are working however I continue to have trouble with my PPT   She was compliant with home exercise program.  Response to previous treatment: no adverse effects   Functional change: none     Pain: 4-5/10  Location: bilateral low back       Objective     Jyoti received therapeutic exercises to develop strength, ROM and flexibility for 45 minutes including:    Recumbent Bike x 8 minutes   Single knee to chest x 20 each   Piriformis stretch 3x30"   LTR x20   PPT x 20   Sciatic nerve glide - NP  Ball squeeze x 20   Supine clams BluTB x 20  SLR 3x10  Bridges - 20 rep   Long sitting HSS 3x30 each      Home Exercises Provided and Patient Education Provided     Education provided:   - continue HEP    Written Home Exercises Provided: Patient instructed to cont prior HEP.  Exercises were reviewed and Jyoti was able to demonstrate them prior to the end of the session.  Jyoti demonstrated good  understanding of the education provided.     See EMR under Patient Instructions for exercises provided prior visit.    Assessment     Pt continues to improve lumbar flexibility and strength during today's treatment session without adverse events. Pt was able to improve PPT exercise tolerance and performance.  Pt ambulates with " trendelenburg gait and would benefit from increased hip strengthening.      Jyoti is progressing well towards her goals.   Pt prognosis is Good.     Pt will continue to benefit from skilled outpatient physical therapy to address the deficits listed in the problem list box on initial evaluation, provide pt/family education and to maximize pt's level of independence in the home and community environment.     Pt's spiritual, cultural and educational needs considered and pt agreeable to plan of care and goals.     Anticipated barriers to physical therapy: chronic condition     Goals:        Long Term Goals: 6 weeks      Goal       Status     1. Pt. to report decreased lumbar pain </ =  2/10 at worst to increase tolerance for upright unsupported sitting posture.     2. Pt. to demonstrate proper posture requiring minimum verbal cues from PT for improved posture positioning      3. Increase lumbar side bending  AROM to 40 degrees to promote greater ease with self-care.     4. Increase lumbar rotation AROM to 45 degrees to promote greater ease with driving.     5. Pt. to demonstrate increased MMT for rectus abdominus to 4/5 to improve supine to sitting transfer.     6. Pt. to demonstrate increased MMT for Lumbar extensor paraspinals to 4/5 to improve tolerance for ADL and work activities.      7. Pt to be independent with HEP to improve ROM and independence with ADL's            Plan     Continue PT POC. ; improve hip flexor/abductor strength    Robe Gil, PT

## 2020-07-07 ENCOUNTER — CLINICAL SUPPORT (OUTPATIENT)
Dept: REHABILITATION | Facility: HOSPITAL | Age: 60
End: 2020-07-07
Attending: FAMILY MEDICINE
Payer: COMMERCIAL

## 2020-07-07 DIAGNOSIS — M54.50 LUMBAR PAIN: ICD-10-CM

## 2020-07-07 PROCEDURE — 97110 THERAPEUTIC EXERCISES: CPT | Mod: PN

## 2020-07-07 NOTE — PROGRESS NOTES
"    Physical Therapy Treatment Note     Name: Jyoti Lott [place]  Clinic Number: [id number]    Therapy Diagnosis:   Encounter Diagnosis   Name Primary?    Lumbar pain      Physician: Tarik Dickerson MD    Visit Date: 7/7/2020    Physician Orders: PT Eval and Treat   Medical Diagnosis from Referral: M54.41,G89.29 (ICD-10-CM) - Chronic midline low back pain with right-sided sciatica  Evaluation Date: 6/23/2020  Authorization Period Expiration: 07/31/2020  Plan of Care Expiration: 9/22/2020 or 12th Visit  Visit # / Visits authorized: 4/12      Time In: 11:00 AM  Time Out: 11:45 AM  Total Billable Time: 45 minutes     Precautions: Standard and Fall    Subjective     Pt reports: I feeling better; I am to exercise (walking) with a reduction in pain   She was compliant with home exercise program.  Response to previous treatment: no adverse effects   Functional change: none     Pain: 4-5/10  Location: bilateral low back       Objective     Jyoti received therapeutic exercises to develop strength, ROM and flexibility for 45 minutes including:    Recumbent Bike x 8 minutes   Single knee to chest x 20 each   Piriformis stretch 3x30"   LTR w/UE above head x20   PPT x 20   Sciatic nerve glide - NP  Ball squeeze x 20   Supine clams BluTB x 20  SLR 3x10  Bridges - 20 rep   Long sitting HSS 3x30 each  Supine marching - 20 reps       Home Exercises Provided and Patient Education Provided     Education provided:   - continue HEP    Written Home Exercises Provided: Patient instructed to cont prior HEP.  Exercises were reviewed and Jyoti was able to demonstrate them prior to the end of the session.  Jyoti demonstrated good  understanding of the education provided.     See EMR under Patient Instructions for exercises provided prior visit.    Assessment     Pt reports an increase in tolerance for functional activity however reports a continued 5-6/10 pain. Pt with continued limited tolerance for therex; Pt reported " non-compliance to HEP due to unfavorable home conditions.     Jyoti is progressing well towards her goals.   Pt prognosis is Good.     Pt will continue to benefit from skilled outpatient physical therapy to address the deficits listed in the problem list box on initial evaluation, provide pt/family education and to maximize pt's level of independence in the home and community environment.     Pt's spiritual, cultural and educational needs considered and pt agreeable to plan of care and goals.     Anticipated barriers to physical therapy: chronic condition     Goals:        Long Term Goals: 6 weeks      Goal       Status     1. Pt. to report decreased lumbar pain </ =  2/10 at worst to increase tolerance for upright unsupported sitting posture.     2. Pt. to demonstrate proper posture requiring minimum verbal cues from PT for improved posture positioning      3. Increase lumbar side bending  AROM to 40 degrees to promote greater ease with self-care.     4. Increase lumbar rotation AROM to 45 degrees to promote greater ease with driving.     5. Pt. to demonstrate increased MMT for rectus abdominus to 4/5 to improve supine to sitting transfer.     6. Pt. to demonstrate increased MMT for Lumbar extensor paraspinals to 4/5 to improve tolerance for ADL and work activities.      7. Pt to be independent with HEP to improve ROM and independence with ADL's            Plan     Continue PT POC. ; improve hip flexor/abductor strength    Robe Gil, PT

## 2020-07-10 ENCOUNTER — CLINICAL SUPPORT (OUTPATIENT)
Dept: REHABILITATION | Facility: HOSPITAL | Age: 60
End: 2020-07-10
Attending: FAMILY MEDICINE
Payer: COMMERCIAL

## 2020-07-10 DIAGNOSIS — M54.50 LUMBAR PAIN: ICD-10-CM

## 2020-07-10 PROCEDURE — 97110 THERAPEUTIC EXERCISES: CPT | Mod: PN

## 2020-07-10 NOTE — PROGRESS NOTES
"    Physical Therapy Treatment Note     Name: Jyoti Lott [place]  Clinic Number: [id number]    Therapy Diagnosis:   Encounter Diagnosis   Name Primary?    Lumbar pain      Physician: Tarik Dickerson MD    Visit Date: 7/10/2020    Physician Orders: PT Eval and Treat   Medical Diagnosis from Referral: M54.41,G89.29 (ICD-10-CM) - Chronic midline low back pain with right-sided sciatica  Evaluation Date: 6/23/2020  Authorization Period Expiration: 07/31/2020  Plan of Care Expiration: 9/22/2020 or 12th Visit  Visit # / Visits authorized: 4/12      Time In: 10:15 AM  Time Out: 11:00 AM  Total Billable Time: 45 minutes     Precautions: Standard and Fall    Subjective     Pt reports:My pain is coming down   She was compliant with home exercise program.  Response to previous treatment: no adverse effects   Functional change: none     Pain: 3-4/10  Location: bilateral low back       Objective     Jyoti received therapeutic exercises to develop strength, ROM and flexibility for 45 minutes including:    Recumbent Bike x 8 minutes   Single knee to chest x 20 each NP  DKC with SB - 20 reps   Piriformis stretch 3x30"   LTR w/UE above head x20 NP  LTR w/ PB -  30 reps  PB crunches - 20 reps w/3 sec. Hold   PB oblique - 30 reps   PPT x 30 3" hold   Sciatic nerve glide - NP  Ball squeeze x 20 hold for 3"  Supine clams BluTB x 30  SLR 3x10  Bridges - 20 rep   Long sitting HSS 3x30 each  Supine marching - 20 reps       Home Exercises Provided and Patient Education Provided     Education provided:   - continue HEP    Written Home Exercises Provided: Patient instructed to cont prior HEP.  Exercises were reviewed and Jyoti was able to demonstrate them prior to the end of the session.  Jyoti demonstrated good  understanding of the education provided.     See EMR under Patient Instructions for exercises provided prior visit.    Assessment     Pt continues to improve lumbar strength with the progression of TX during today's " treatment session. Pt also able to decreased lumbar pain while performing functional activity. PT issued additional HEP to improve strength.     Jyoti is progressing well towards her goals.   Pt prognosis is Good.     Pt will continue to benefit from skilled outpatient physical therapy to address the deficits listed in the problem list box on initial evaluation, provide pt/family education and to maximize pt's level of independence in the home and community environment.     Pt's spiritual, cultural and educational needs considered and pt agreeable to plan of care and goals.     Anticipated barriers to physical therapy: chronic condition     Goals:        Long Term Goals: 6 weeks      Goal       Status     1. Pt. to report decreased lumbar pain </ =  2/10 at worst to increase tolerance for upright unsupported sitting posture. Progressing    2. Pt. to demonstrate proper posture requiring minimum verbal cues from PT for improved posture positioning  Progressing   3. Increase lumbar side bending  AROM to 40 degrees to promote greater ease with self-care. Progressing   4. Increase lumbar rotation AROM to 45 degrees to promote greater ease with driving. Progressing   5. Pt. to demonstrate increased MMT for rectus abdominus to 4/5 to improve supine to sitting transfer. Progressing   6. Pt. to demonstrate increased MMT for Lumbar extensor paraspinals to 4/5 to improve tolerance for ADL and work activities.  Progressing   7. Pt to be independent with HEP to improve ROM and independence with ADL's Progressing          Plan     Continue PT POC. ; improve hip flexor/abductor strength    Robe Gil, PT

## 2020-07-14 ENCOUNTER — CLINICAL SUPPORT (OUTPATIENT)
Dept: REHABILITATION | Facility: HOSPITAL | Age: 60
End: 2020-07-14
Attending: FAMILY MEDICINE
Payer: COMMERCIAL

## 2020-07-14 DIAGNOSIS — M54.50 LUMBAR PAIN: ICD-10-CM

## 2020-07-14 PROCEDURE — 97110 THERAPEUTIC EXERCISES: CPT | Mod: PN,CQ

## 2020-07-14 NOTE — PROGRESS NOTES
"      Physical Therapy Treatment Note     Name: Jyoti Lott [place]  Clinic Number: [id number]    Therapy Diagnosis:   Encounter Diagnosis   Name Primary?    Lumbar pain      Physician: Tarik Dickerson MD    Visit Date: 7/14/2020    Physician Orders: PT Eval and Treat   Medical Diagnosis from Referral: M54.41,G89.29 (ICD-10-CM) - Chronic midline low back pain with right-sided sciatica  Evaluation Date: 6/23/2020  Authorization Period Expiration: 07/31/2020  Plan of Care Expiration: 9/22/2020 or 12th Visit  Visit # / Visits authorized: 5/12      Time In: 11:20 AM  Time Out: 12:04 PM  Total Billable Time: 44 minutes     Precautions: Standard and Fall    Subjective     Pt reports: she feels pretty good. Reports the exercises are really helping.   She was compliant with home exercise program.  Response to previous treatment: no adverse effects   Functional change: none     Pain: 2-310  Location: bilateral low back       Objective     Jyoti received therapeutic exercises to develop strength, ROM and flexibility for 44 minutes including:    Bolded exercises performed today:    Recumbent Bike x 8 minutes   Single knee to chest x 20 each NP  DKC with SB - 30 reps   Piriformis stretch 3x30"   LTR w/UE above head x20 NP  LTR w/ PB -  30 reps  PB crunches - 20 reps w/3 sec. Hold   PB oblique - 30 reps   PPT x 30 3" hold   Sciatic nerve glide - NP  Ball squeeze x 20 hold for 3"  Supine clams BluTB x 30  SLR 3x10  Bridges - 20 rep   Long sitting HSS 3x30 each  Supine marching - 20 reps       Home Exercises Provided and Patient Education Provided     Education provided:   - continue HEP    Written Home Exercises Provided: Patient instructed to cont prior HEP.  Exercises were reviewed and yJoti was able to demonstrate them prior to the end of the session.  Jyoti demonstrated good  understanding of the education provided.     See EMR under Patient Instructions for exercises provided prior visit.    Assessment     Patient " performed bolded exercises above today and with no increased pain.     Jyoti is progressing well towards her goals.   Pt prognosis is Good.     Pt will continue to benefit from skilled outpatient physical therapy to address the deficits listed in the problem list box on initial evaluation, provide pt/family education and to maximize pt's level of independence in the home and community environment.     Pt's spiritual, cultural and educational needs considered and pt agreeable to plan of care and goals.     Anticipated barriers to physical therapy: chronic condition     Goals:        Long Term Goals: 6 weeks      Goal       Status     1. Pt. to report decreased lumbar pain </ =  2/10 at worst to increase tolerance for upright unsupported sitting posture. Progressing    2. Pt. to demonstrate proper posture requiring minimum verbal cues from PT for improved posture positioning  Progressing   3. Increase lumbar side bending  AROM to 40 degrees to promote greater ease with self-care. Progressing   4. Increase lumbar rotation AROM to 45 degrees to promote greater ease with driving. Progressing   5. Pt. to demonstrate increased MMT for rectus abdominus to 4/5 to improve supine to sitting transfer. Progressing   6. Pt. to demonstrate increased MMT for Lumbar extensor paraspinals to 4/5 to improve tolerance for ADL and work activities.  Progressing   7. Pt to be independent with HEP to improve ROM and independence with ADL's Progressing          Plan     Continue PT POC. ; improve hip flexor/abductor strength    Divya Valentine, PTA

## 2020-08-07 ENCOUNTER — CLINICAL SUPPORT (OUTPATIENT)
Dept: REHABILITATION | Facility: HOSPITAL | Age: 60
End: 2020-08-07
Attending: FAMILY MEDICINE
Payer: COMMERCIAL

## 2020-08-07 DIAGNOSIS — M54.50 LUMBAR PAIN: ICD-10-CM

## 2020-08-07 PROCEDURE — 97110 THERAPEUTIC EXERCISES: CPT | Mod: PN

## 2020-08-07 NOTE — PROGRESS NOTES
"      Physical Therapy Treatment Note     Name: Jyoti Lott [place]  Clinic Number: [id number]    Therapy Diagnosis:   Encounter Diagnosis   Name Primary?    Lumbar pain      Physician: Tarik Dickerson MD    Visit Date: 8/7/2020    Physician Orders: PT Eval and Treat   Medical Diagnosis from Referral: M54.41,G89.29 (ICD-10-CM) - Chronic midline low back pain with right-sided sciatica  Evaluation Date: 6/23/2020  Authorization Period Expiration: 07/31/2020  Plan of Care Expiration: 9/22/2020 or 12th Visit  Visit # / Visits authorized: 6/12      Time In: 900 AM  Time Out: 930 PM  Total Billable Time: 30 minutes     Precautions: Standard and Fall    Subjective     Pt reports: my back is feeling much better just minor pain however I am experiencing pain in  My back.   She was compliant with home exercise program.  Response to previous treatment: no adverse effects   Functional change: none     Pain: 3/10  Location: bilateral low back       Objective     Jyoti received therapeutic exercises to develop strength, ROM and flexibility for 44 minutes including:    Bolded exercises performed today:    Recumbent Bike x 8 minutes   Single knee to chest x 20 each NP  DKC with SB - 30 reps   Piriformis stretch 3x30"   LTR w/UE above head x20 NP  LTR w/ PB -  30 reps  PB crunches - 20 reps w/3 sec. Hold   PB oblique - 30 reps   PPT x 30 3" hold   Sciatic nerve glide - NP  Ball squeeze x 20 hold for 3"  Supine clams BluTB x 30  SLR 3x10  Bridges - 20 rep   Long sitting HSS 3x30 each  Supine marching - 20 reps       Home Exercises Provided and Patient Education Provided     Education provided:   - continue HEP    Written Home Exercises Provided: Patient instructed to cont prior HEP.  Exercises were reviewed and Jyoti was able to demonstrate them prior to the end of the session.  Jyoti demonstrated good  understanding of the education provided.     See EMR under Patient Instructions for exercises provided prior " visit.    Assessment     Pt currently presents to treatment session with continued reports of decreased pain rated 3/10. Pt was able to tolerate an increase in therex without reports of increased pain. Pt also displays an increase in core strength during today's treatment session.     Jyoti is progressing well towards her goals.   Pt prognosis is Good.     Pt will continue to benefit from skilled outpatient physical therapy to address the deficits listed in the problem list box on initial evaluation, provide pt/family education and to maximize pt's level of independence in the home and community environment.     Pt's spiritual, cultural and educational needs considered and pt agreeable to plan of care and goals.     Anticipated barriers to physical therapy: chronic condition     Goals:        Long Term Goals: 6 weeks      Goal       Status     1. Pt. to report decreased lumbar pain </ =  2/10 at worst to increase tolerance for upright unsupported sitting posture. Progressing    2. Pt. to demonstrate proper posture requiring minimum verbal cues from PT for improved posture positioning  Progressing   3. Increase lumbar side bending  AROM to 40 degrees to promote greater ease with self-care. Progressing   4. Increase lumbar rotation AROM to 45 degrees to promote greater ease with driving. Progressing   5. Pt. to demonstrate increased MMT for rectus abdominus to 4/5 to improve supine to sitting transfer. Progressing   6. Pt. to demonstrate increased MMT for Lumbar extensor paraspinals to 4/5 to improve tolerance for ADL and work activities.  Progressing   7. Pt to be independent with HEP to improve ROM and independence with ADL's Progressing          Plan     Continue PT POC. ; improve hip flexor/abductor strength    Tarik Gil, PT

## 2020-08-10 ENCOUNTER — TELEPHONE (OUTPATIENT)
Dept: INTERNAL MEDICINE | Facility: CLINIC | Age: 60
End: 2020-08-10

## 2020-08-10 NOTE — TELEPHONE ENCOUNTER
----- Message from Mercedes Spencer sent at 8/10/2020  4:28 PM CDT -----  Type:  Patient Returning Call    Who Called:pt  Who Left Message for Patient:pt  Does the patient know what this is regarding?: need  a call from the nurse  Would the patient rather a call back or a response via [facility]?  Call   Best Call Back Number:[phone]  Additional Information:  call back

## 2020-08-12 ENCOUNTER — OFFICE VISIT (OUTPATIENT)
Dept: INTERNAL MEDICINE | Facility: CLINIC | Age: 60
End: 2020-08-12
Payer: COMMERCIAL

## 2020-08-12 VITALS — WEIGHT: 170 LBS | BODY MASS INDEX: 33.38 KG/M2 | HEIGHT: 60 IN

## 2020-08-12 DIAGNOSIS — Z11.4 SCREENING FOR HIV (HUMAN IMMUNODEFICIENCY VIRUS): ICD-10-CM

## 2020-08-12 DIAGNOSIS — Z01.84 IMMUNITY STATUS TESTING: ICD-10-CM

## 2020-08-12 DIAGNOSIS — R73.03 PREDIABETES: Primary | ICD-10-CM

## 2020-08-12 DIAGNOSIS — E78.5 HYPERLIPIDEMIA, UNSPECIFIED HYPERLIPIDEMIA TYPE: ICD-10-CM

## 2020-08-12 DIAGNOSIS — I65.23 BILATERAL CAROTID ARTERY STENOSIS: ICD-10-CM

## 2020-08-12 DIAGNOSIS — E55.9 VITAMIN D DEFICIENCY: ICD-10-CM

## 2020-08-12 DIAGNOSIS — Z00.00 ANNUAL PHYSICAL EXAM: ICD-10-CM

## 2020-08-12 DIAGNOSIS — G89.29 CHRONIC RIGHT-SIDED LOW BACK PAIN WITH RIGHT-SIDED SCIATICA: ICD-10-CM

## 2020-08-12 DIAGNOSIS — M54.41 CHRONIC RIGHT-SIDED LOW BACK PAIN WITH RIGHT-SIDED SCIATICA: ICD-10-CM

## 2020-08-12 PROCEDURE — 3008F PR BODY MASS INDEX (BMI) DOCUMENTED: ICD-10-PCS | Mod: CPTII,,, | Performed by: FAMILY MEDICINE

## 2020-08-12 PROCEDURE — 3008F BODY MASS INDEX DOCD: CPT | Mod: CPTII,,, | Performed by: FAMILY MEDICINE

## 2020-08-12 PROCEDURE — 99213 PR OFFICE/OUTPT VISIT, EST, LEVL III, 20-29 MIN: ICD-10-PCS | Mod: 95,,, | Performed by: FAMILY MEDICINE

## 2020-08-12 PROCEDURE — 99213 OFFICE O/P EST LOW 20 MIN: CPT | Mod: 95,,, | Performed by: FAMILY MEDICINE

## 2020-08-12 RX ORDER — PRAVASTATIN SODIUM 40 MG/1
40 TABLET ORAL DAILY
Qty: 90 TABLET | Refills: 1 | Status: SHIPPED | OUTPATIENT
Start: 2020-08-12 | End: 2021-01-13 | Stop reason: SDUPTHER

## 2020-08-12 RX ORDER — CYCLOBENZAPRINE HCL 10 MG
10 TABLET ORAL 2 TIMES DAILY PRN
Qty: 30 TABLET | Refills: 0 | Status: SHIPPED | OUTPATIENT
Start: 2020-08-12

## 2020-08-12 NOTE — PROGRESS NOTES
Subjective:       Patient ID: Jyoti Blue is a 60 y.o. female.    Chief Complaint: COVID-19 Concerns    HPI 59 y/o female with HLPD, OA of R knee, CLBP, Vit D Def, Prediabetes is here for covid concerns.     She has been working from [employer] from home, she been recommended to be swabbed for covid-19 for her job prior to returning to work.   She is feeling good in general, she has maintained her weight, she has a goal to lose 30 more pounds. She is still eating low carb, she is doing stationary bike and walking around her neighborhood some.  She feels good in general, she denies f/n/v/d/constipation/cp/sob/urinary sx. Sleeping well.  Mood ok.     She has CLBP since fall in 12/2015, her back pain comes and goes, pain is R sided achy and sharp, sometimes spreads down to R leg, she denies saddle anesthesia/bowel or bladder incontinence/leg weakness.  She has been doing PT 2 days a week and it has been really helpful, she has been taking Flexeril prn for lower back muscle spasm, she takes it 3-4 times a month     ENT Dr. Terrell, gets cerumen impaction and has to get her ears cleaned every 6 months  R knee pain on and off for 10 years: pain is achy and comes and goes, she denies knee swelling, she has been to ortho and had multiple injections, she repots taking Naproxen 1-2 days a month helps and diclofenac helps some  GYN: followed by Dr. Quita Bess, mmg due DIS and pelvic due  Prediabetes: a1c 5.7 6/2020, never been on metformin  Vitamin D def: Vitamin D3 2,000 IU daily gummies  HLPD, Mild carotid disease: repeat Carotid US in 2024; asa 81 mg daily,  Pravastatin 40 mg daily  OTC: asa 81 mg daily  Colonoscopy done in 2011 was normal  Eye exam due  Dental utd  Vaccines: utd    Review of Systems   Constitutional: Negative for activity change and unexpected weight change.   HENT: Negative for hearing loss, rhinorrhea and trouble swallowing.    Eyes: Negative for discharge and visual disturbance.   Respiratory:  Negative for chest tightness and wheezing.    Cardiovascular: Negative for chest pain and palpitations.   Gastrointestinal: Negative for blood in stool, constipation, diarrhea and vomiting.   Endocrine: Negative for polydipsia and polyuria.   Genitourinary: Negative for difficulty urinating, dysuria, hematuria and menstrual problem.   Musculoskeletal: Negative for arthralgias, joint swelling and neck pain.   Neurological: Negative for weakness and headaches.   Psychiatric/Behavioral: Negative for confusion and dysphoric mood.       Objective:      Ht 5' (1.524 m)   Wt 77.1 kg (170 lb)   BMI 33.20 kg/m²   Physical Exam    Assessment:       1. Prediabetes    2. Hyperlipidemia, unspecified hyperlipidemia type    3. Bilateral carotid artery stenosis    4. Screening for HIV (human immunodeficiency virus)    5. Annual physical exam    6. Vitamin D deficiency    7. Immunity status testing    8. Chronic right-sided low back pain with right-sided sciatica        Plan:   Jyoti was seen today for covid-19 concerns.    Diagnoses and all orders for this visit:    Prediabetes    Hyperlipidemia, unspecified hyperlipidemia type  -     Lipid Panel; Future  -     pravastatin (PRAVACHOL) 40 MG tablet; Take 1 tablet (40 mg total) by mouth once daily.    Bilateral carotid artery stenosis  -     pravastatin (PRAVACHOL) 40 MG tablet; Take 1 tablet (40 mg total) by mouth once daily.    Screening for HIV (human immunodeficiency virus)  -     HIV 1/2 Ag/Ab (4th Gen); Future    Annual physical exam  -     CBC auto differential; Future  -     Comprehensive metabolic panel; Future  -     Hemoglobin A1C; Future  -     Lipid Panel; Future  -     TSH; Future  -     Vitamin D; Future  -     Urinalysis; Future  -     HIV 1/2 Ag/Ab (4th Gen); Future    Vitamin D deficiency    Immunity status testing  -     COVID-19 (SARS CoV-2) IgG Antibody; Future    Chronic right-sided low back pain with right-sided sciatica  -     cyclobenzaprine (FLEXERIL) 10  MG tablet; Take 1 tablet (10 mg total) by mouth 2 (two) times daily as needed.  -     Ambulatory referral/consult to Physical/Occupational Therapy; Future    The patient location is: [place]  The chief complaint leading to consultation is: prediabetes    Visit type: audiovisual    Face to Face time with patient: 20 minutes of total time spent on the encounter, which includes face to face time and non-face to face time preparing to see the patient (eg, review of tests), Obtaining and/or reviewing separately obtained history, Documenting clinical information in the electronic or other health record, Independently interpreting results (not separately reported) and communicating results to the patient/family/caregiver, or Care coordination (not separately reported).         Each patient to whom he or she provides medical services by telemedicine is:  (1) informed of the relationship between the physician and patient and the respective role of any other health care provider with respect to management of the patient; and (2) notified that he or she may decline to receive medical services by telemedicine and may withdraw from such care at any time.    Notes:

## 2020-09-08 ENCOUNTER — LAB VISIT (OUTPATIENT)
Dept: PRIMARY CARE CLINIC | Facility: CLINIC | Age: 60
End: 2020-09-08
Payer: COMMERCIAL

## 2020-09-08 DIAGNOSIS — Z03.818 ENCOUNTER FOR OBSERVATION FOR SUSPECTED EXPOSURE TO OTHER BIOLOGICAL AGENTS RULED OUT: ICD-10-CM

## 2020-09-08 PROCEDURE — U0003 INFECTIOUS AGENT DETECTION BY NUCLEIC ACID (DNA OR RNA); SEVERE ACUTE RESPIRATORY SYNDROME CORONAVIRUS 2 (SARS-COV-2) (CORONAVIRUS DISEASE [COVID-19]), AMPLIFIED PROBE TECHNIQUE, MAKING USE OF HIGH THROUGHPUT TECHNOLOGIES AS DESCRIBED BY CMS-2020-01-R: HCPCS

## 2020-09-09 LAB — SARS-COV-2 RNA RESP QL NAA+PROBE: NOT DETECTED

## 2020-09-11 DIAGNOSIS — Z12.39 BREAST CANCER SCREENING: ICD-10-CM

## 2020-10-13 ENCOUNTER — LAB VISIT (OUTPATIENT)
Dept: LAB | Facility: HOSPITAL | Age: 60
End: 2020-10-13
Attending: FAMILY MEDICINE
Payer: COMMERCIAL

## 2020-10-13 DIAGNOSIS — Z01.84 IMMUNITY STATUS TESTING: ICD-10-CM

## 2020-10-13 DIAGNOSIS — E78.5 HYPERLIPIDEMIA, UNSPECIFIED HYPERLIPIDEMIA TYPE: ICD-10-CM

## 2020-10-13 DIAGNOSIS — Z00.00 ANNUAL PHYSICAL EXAM: ICD-10-CM

## 2020-10-13 DIAGNOSIS — Z11.4 SCREENING FOR HIV (HUMAN IMMUNODEFICIENCY VIRUS): ICD-10-CM

## 2020-10-13 LAB
25(OH)D3+25(OH)D2 SERPL-MCNC: 31 NG/ML (ref 30–96)
ALBUMIN SERPL BCP-MCNC: 4 G/DL (ref 3.5–5.2)
ALP SERPL-CCNC: 73 U/L (ref 55–135)
ALT SERPL W/O P-5'-P-CCNC: 17 U/L (ref 10–44)
ANION GAP SERPL CALC-SCNC: 8 MMOL/L (ref 8–16)
AST SERPL-CCNC: 19 U/L (ref 10–40)
BASOPHILS # BLD AUTO: 0.03 K/UL (ref 0–0.2)
BASOPHILS NFR BLD: 1.1 % (ref 0–1.9)
BILIRUB SERPL-MCNC: 0.4 MG/DL (ref 0.1–1)
BUN SERPL-MCNC: 14 MG/DL (ref 6–20)
CALCIUM SERPL-MCNC: 9.5 MG/DL (ref 8.7–10.5)
CHLORIDE SERPL-SCNC: 105 MMOL/L (ref 95–110)
CHOLEST SERPL-MCNC: 213 MG/DL (ref 120–199)
CHOLEST/HDLC SERPL: 3.1 {RATIO} (ref 2–5)
CO2 SERPL-SCNC: 28 MMOL/L (ref 23–29)
CREAT SERPL-MCNC: 0.7 MG/DL (ref 0.5–1.4)
DIFFERENTIAL METHOD: ABNORMAL
EOSINOPHIL # BLD AUTO: 0 K/UL (ref 0–0.5)
EOSINOPHIL NFR BLD: 1.5 % (ref 0–8)
ERYTHROCYTE [DISTWIDTH] IN BLOOD BY AUTOMATED COUNT: 14.7 % (ref 11.5–14.5)
EST. GFR  (AFRICAN AMERICAN): >60 ML/MIN/1.73 M^2
EST. GFR  (NON AFRICAN AMERICAN): >60 ML/MIN/1.73 M^2
ESTIMATED AVG GLUCOSE: 123 MG/DL (ref 68–131)
GLUCOSE SERPL-MCNC: 96 MG/DL (ref 70–110)
HBA1C MFR BLD HPLC: 5.9 % (ref 4–5.6)
HCT VFR BLD AUTO: 38.9 % (ref 37–48.5)
HDLC SERPL-MCNC: 69 MG/DL (ref 40–75)
HDLC SERPL: 32.4 % (ref 20–50)
HGB BLD-MCNC: 12.3 G/DL (ref 12–16)
IMM GRANULOCYTES # BLD AUTO: 0 K/UL (ref 0–0.04)
IMM GRANULOCYTES NFR BLD AUTO: 0 % (ref 0–0.5)
LDLC SERPL CALC-MCNC: 132.8 MG/DL (ref 63–159)
LYMPHOCYTES # BLD AUTO: 1.1 K/UL (ref 1–4.8)
LYMPHOCYTES NFR BLD: 40.2 % (ref 18–48)
MCH RBC QN AUTO: 28.5 PG (ref 27–31)
MCHC RBC AUTO-ENTMCNC: 31.6 G/DL (ref 32–36)
MCV RBC AUTO: 90 FL (ref 82–98)
MONOCYTES # BLD AUTO: 0.2 K/UL (ref 0.3–1)
MONOCYTES NFR BLD: 9.1 % (ref 4–15)
NEUTROPHILS # BLD AUTO: 1.3 K/UL (ref 1.8–7.7)
NEUTROPHILS NFR BLD: 48.1 % (ref 38–73)
NONHDLC SERPL-MCNC: 144 MG/DL
NRBC BLD-RTO: 0 /100 WBC
PLATELET # BLD AUTO: 295 K/UL (ref 150–350)
PMV BLD AUTO: 11.3 FL (ref 9.2–12.9)
POTASSIUM SERPL-SCNC: 4.3 MMOL/L (ref 3.5–5.1)
PROT SERPL-MCNC: 7.4 G/DL (ref 6–8.4)
RBC # BLD AUTO: 4.32 M/UL (ref 4–5.4)
SARS-COV-2 IGG SERPLBLD QL IA.RAPID: NEGATIVE
SODIUM SERPL-SCNC: 141 MMOL/L (ref 136–145)
TRIGL SERPL-MCNC: 56 MG/DL (ref 30–150)
TSH SERPL DL<=0.005 MIU/L-ACNC: 1.34 UIU/ML (ref 0.4–4)
WBC # BLD AUTO: 2.64 K/UL (ref 3.9–12.7)

## 2020-10-13 PROCEDURE — 80061 LIPID PANEL: CPT

## 2020-10-13 PROCEDURE — 86703 HIV-1/HIV-2 1 RESULT ANTBDY: CPT

## 2020-10-13 PROCEDURE — 80053 COMPREHEN METABOLIC PANEL: CPT

## 2020-10-13 PROCEDURE — 85025 COMPLETE CBC W/AUTO DIFF WBC: CPT

## 2020-10-13 PROCEDURE — 82306 VITAMIN D 25 HYDROXY: CPT

## 2020-10-13 PROCEDURE — 84443 ASSAY THYROID STIM HORMONE: CPT

## 2020-10-13 PROCEDURE — 36415 COLL VENOUS BLD VENIPUNCTURE: CPT

## 2020-10-13 PROCEDURE — 86769 SARS-COV-2 COVID-19 ANTIBODY: CPT

## 2020-10-13 PROCEDURE — 83036 HEMOGLOBIN GLYCOSYLATED A1C: CPT

## 2020-10-14 LAB — HIV 1+2 AB+HIV1 P24 AG SERPL QL IA: NEGATIVE

## 2020-10-27 ENCOUNTER — OFFICE VISIT (OUTPATIENT)
Dept: INTERNAL MEDICINE | Facility: CLINIC | Age: 60
End: 2020-10-27
Payer: COMMERCIAL

## 2020-10-27 VITALS
OXYGEN SATURATION: 99 % | HEART RATE: 66 BPM | WEIGHT: 176.25 LBS | DIASTOLIC BLOOD PRESSURE: 66 MMHG | HEIGHT: 60 IN | TEMPERATURE: 98 F | SYSTOLIC BLOOD PRESSURE: 120 MMHG | BODY MASS INDEX: 34.6 KG/M2

## 2020-10-27 DIAGNOSIS — M54.41 CHRONIC RIGHT-SIDED LOW BACK PAIN WITH RIGHT-SIDED SCIATICA: ICD-10-CM

## 2020-10-27 DIAGNOSIS — E78.5 HYPERLIPIDEMIA, UNSPECIFIED HYPERLIPIDEMIA TYPE: ICD-10-CM

## 2020-10-27 DIAGNOSIS — R73.03 PREDIABETES: ICD-10-CM

## 2020-10-27 DIAGNOSIS — G89.29 CHRONIC RIGHT-SIDED LOW BACK PAIN WITH RIGHT-SIDED SCIATICA: ICD-10-CM

## 2020-10-27 DIAGNOSIS — Z00.00 ANNUAL PHYSICAL EXAM: Primary | ICD-10-CM

## 2020-10-27 DIAGNOSIS — M25.511 ACUTE PAIN OF RIGHT SHOULDER: ICD-10-CM

## 2020-10-27 PROCEDURE — 99396 PR PREVENTIVE VISIT,EST,40-64: ICD-10-PCS | Mod: 25,S$GLB,, | Performed by: FAMILY MEDICINE

## 2020-10-27 PROCEDURE — 90471 FLU VACCINE (QUAD) GREATER THAN OR EQUAL TO 3YO PRESERVATIVE FREE IM: ICD-10-PCS | Mod: S$GLB,,, | Performed by: FAMILY MEDICINE

## 2020-10-27 PROCEDURE — 90686 IIV4 VACC NO PRSV 0.5 ML IM: CPT | Mod: S$GLB,,, | Performed by: FAMILY MEDICINE

## 2020-10-27 PROCEDURE — 99999 PR PBB SHADOW E&M-EST. PATIENT-LVL IV: CPT | Mod: PBBFAC,,, | Performed by: FAMILY MEDICINE

## 2020-10-27 PROCEDURE — 90471 IMMUNIZATION ADMIN: CPT | Mod: S$GLB,,, | Performed by: FAMILY MEDICINE

## 2020-10-27 PROCEDURE — 3008F BODY MASS INDEX DOCD: CPT | Mod: CPTII,S$GLB,, | Performed by: FAMILY MEDICINE

## 2020-10-27 PROCEDURE — 99999 PR PBB SHADOW E&M-EST. PATIENT-LVL IV: ICD-10-PCS | Mod: PBBFAC,,, | Performed by: FAMILY MEDICINE

## 2020-10-27 PROCEDURE — 3008F PR BODY MASS INDEX (BMI) DOCUMENTED: ICD-10-PCS | Mod: CPTII,S$GLB,, | Performed by: FAMILY MEDICINE

## 2020-10-27 PROCEDURE — 90686 FLU VACCINE (QUAD) GREATER THAN OR EQUAL TO 3YO PRESERVATIVE FREE IM: ICD-10-PCS | Mod: S$GLB,,, | Performed by: FAMILY MEDICINE

## 2020-10-27 PROCEDURE — 99396 PREV VISIT EST AGE 40-64: CPT | Mod: 25,S$GLB,, | Performed by: FAMILY MEDICINE

## 2020-10-27 NOTE — PROGRESS NOTES
Subjective:       Patient ID: Jyoti Blue is a 60 y.o. female.    Chief Complaint: Annual Exam    HPI  59 y/o female with HLPD, OA of R knee, CLBP, Vit D Def, Prediabetes is here for annual exam.     She is feeling good, she has had R upper arm pain off and on for the past few months, no fall or injury, occurs a few times every few weeks, pain is sharp and fleeting depending on what she is doing, pain is 3/10, pain does not radiate, she denies numbness or tingling, better with Naproxen, tylenol and biofreeze, she is R handed.  She denies f/n/v/d/constipation/cp/sob/urinary sx. She follows a low carb diet, stationary bike and walking, weight up 3 pounds. Sleeping well.  Mood ok.      She has CLBP since fall in 12/2015, her back pain comes and goes, pain is R sided achy and sharp, sometimes spreads down to R leg, she denies saddle anesthesia/bowel or bladder incontinence/leg weakness.  She has been doing PT 2 days a week and it has been really helpful, she has been taking Flexeril prn for lower back muscle spasm, she takes it 3-4 times a month     ENT Dr. Terrell, gets cerumen impaction and has to get her ears cleaned every 6 months  R knee pain on and off for 10 years: pain is achy and comes and goes, she denies knee swelling, she has been to ortho and had multiple injections, she repots taking Naproxen 1-2 days a month helps and diclofenac helps some  GYN: followed by Dr. Quita Bess, mmg due DIS and pelvic due has it scheduled  Prediabetes: a1c 5.9 10/2020, discussed metformin and trulicity  Vitamin D def: Vitamin D3 2,000 IU daily gummies  HLPD, Mild carotid disease: repeat Carotid US in 2024; asa 81 mg daily,  Pravastatin 40 mg daily  OTC: asa 81 mg daily  Colonoscopy done in 2011 was normal  Eye exam due  Dental utd  Vaccines: flu shot today    Labs 10/13/20 reviewed    Review of Systems  see HPI  Objective:      /66 (BP Location: Left arm, Patient Position: Sitting, BP Method: Large (Manual))    Pulse 66   Temp 97.7 °F (36.5 °C)   Ht 5' (1.524 m)   Wt 79.9 kg (176 lb 4.1 oz)   SpO2 99%   BMI 34.42 kg/m²   Physical Exam  Vitals signs and nursing note reviewed.   Constitutional:       Appearance: She is well-developed.   HENT:      Head: Normocephalic and atraumatic.      Mouth/Throat:      Pharynx: No oropharyngeal exudate.   Neck:      Musculoskeletal: Normal range of motion and neck supple.      Thyroid: No thyromegaly.   Cardiovascular:      Rate and Rhythm: Normal rate and regular rhythm.      Heart sounds: Normal heart sounds.   Pulmonary:      Effort: Pulmonary effort is normal. No respiratory distress.      Breath sounds: Normal breath sounds.   Abdominal:      General: Abdomen is flat. Bowel sounds are normal. There is no distension.      Palpations: Abdomen is soft. There is no mass.      Tenderness: There is no abdominal tenderness.   Musculoskeletal: Normal range of motion.         General: No swelling, tenderness or deformity.      Comments: Strength 5/5 bilateral ue   Lymphadenopathy:      Cervical: No cervical adenopathy.   Skin:     General: Skin is warm and dry.   Neurological:      Mental Status: She is alert.         Assessment:       1. Annual physical exam    2. Hyperlipidemia, unspecified hyperlipidemia type    3. Prediabetes    4. Chronic right-sided low back pain with right-sided sciatica    5. Acute pain of right shoulder        Plan:   Jyoti was seen today for annual exam.    Diagnoses and all orders for this visit:    Annual physical exam    Hyperlipidemia, unspecified hyperlipidemia type  -     Lipid Panel; Future    Prediabetes  -     Hemoglobin A1C; Future    Chronic right-sided low back pain with right-sided sciatica    Acute pain of right shoulder  -     X-ray Shoulder 2 or More Views Right; Future    declined PT for now

## 2020-10-27 NOTE — PROGRESS NOTES
After obtaining consent, and per orders of Dr. Yip, injection of Fluarix Lot [id number] 06/30/2021 given in the RD by Kristie Peterson. Patient tolerated well and band aid applied. Patient instructed to remain in clinic for 15 minutes afterwards, and to report any adverse reaction to me immediately.

## 2020-11-18 DIAGNOSIS — M17.11 PRIMARY OSTEOARTHRITIS OF RIGHT KNEE: ICD-10-CM

## 2020-11-18 RX ORDER — NAPROXEN 500 MG/1
500 TABLET ORAL DAILY PRN
Qty: 30 TABLET | Refills: 0 | Status: SHIPPED | OUTPATIENT
Start: 2020-11-18 | End: 2022-07-06 | Stop reason: SDUPTHER

## 2021-01-03 ENCOUNTER — CLINICAL SUPPORT (OUTPATIENT)
Dept: URGENT CARE | Facility: CLINIC | Age: 61
End: 2021-01-03
Payer: COMMERCIAL

## 2021-01-03 ENCOUNTER — NURSE TRIAGE (OUTPATIENT)
Dept: ADMINISTRATIVE | Facility: CLINIC | Age: 61
End: 2021-01-03

## 2021-01-03 VITALS — OXYGEN SATURATION: 97 % | TEMPERATURE: 97 F | HEART RATE: 77 BPM

## 2021-01-03 DIAGNOSIS — Z11.52 ENCOUNTER FOR SCREENING FOR COVID-19: Primary | ICD-10-CM

## 2021-01-03 DIAGNOSIS — U07.1 COVID-19 VIRUS DETECTED: ICD-10-CM

## 2021-01-03 LAB
CTP QC/QA: YES
SARS-COV-2 RDRP RESP QL NAA+PROBE: POSITIVE

## 2021-01-03 PROCEDURE — U0002: ICD-10-PCS | Mod: QW,S$GLB,, | Performed by: FAMILY MEDICINE

## 2021-01-03 PROCEDURE — U0002 COVID-19 LAB TEST NON-CDC: HCPCS | Mod: QW,S$GLB,, | Performed by: FAMILY MEDICINE

## 2021-01-05 ENCOUNTER — TELEPHONE (OUTPATIENT)
Dept: INTERNAL MEDICINE | Facility: CLINIC | Age: 61
End: 2021-01-05

## 2021-01-06 ENCOUNTER — DOCUMENTATION ONLY (OUTPATIENT)
Dept: INTERNAL MEDICINE | Facility: CLINIC | Age: 61
End: 2021-01-06

## 2021-01-06 ENCOUNTER — TELEPHONE (OUTPATIENT)
Dept: INTERNAL MEDICINE | Facility: CLINIC | Age: 61
End: 2021-01-06

## 2021-01-06 ENCOUNTER — OFFICE VISIT (OUTPATIENT)
Dept: INTERNAL MEDICINE | Facility: CLINIC | Age: 61
End: 2021-01-06
Payer: COMMERCIAL

## 2021-01-06 DIAGNOSIS — U07.1 COVID-19 VIRUS INFECTION: Primary | ICD-10-CM

## 2021-01-06 PROCEDURE — 99212 OFFICE O/P EST SF 10 MIN: CPT | Mod: 95,,, | Performed by: INTERNAL MEDICINE

## 2021-01-06 PROCEDURE — 99212 PR OFFICE/OUTPT VISIT, EST, LEVL II, 10-19 MIN: ICD-10-PCS | Mod: 95,,, | Performed by: INTERNAL MEDICINE

## 2021-01-11 DIAGNOSIS — R11.0 NAUSEA: Primary | ICD-10-CM

## 2021-01-11 DIAGNOSIS — U07.1 COVID-19: ICD-10-CM

## 2021-01-11 RX ORDER — ONDANSETRON 4 MG/1
4 TABLET, ORALLY DISINTEGRATING ORAL EVERY 12 HOURS PRN
Qty: 10 TABLET | Refills: 0 | Status: SHIPPED | OUTPATIENT
Start: 2021-01-11 | End: 2021-01-11 | Stop reason: SDUPTHER

## 2021-01-11 RX ORDER — ONDANSETRON 4 MG/1
4 TABLET, ORALLY DISINTEGRATING ORAL EVERY 12 HOURS PRN
Qty: 10 TABLET | Refills: 0 | Status: SHIPPED | OUTPATIENT
Start: 2021-01-11 | End: 2022-02-03

## 2021-01-12 ENCOUNTER — TELEPHONE (OUTPATIENT)
Dept: ADMINISTRATIVE | Facility: OTHER | Age: 61
End: 2021-01-12

## 2021-01-12 ENCOUNTER — TELEPHONE (OUTPATIENT)
Dept: ADMINISTRATIVE | Facility: CLINIC | Age: 61
End: 2021-01-12

## 2021-01-13 ENCOUNTER — TELEPHONE (OUTPATIENT)
Dept: ADMINISTRATIVE | Facility: CLINIC | Age: 61
End: 2021-01-13

## 2021-01-13 ENCOUNTER — OFFICE VISIT (OUTPATIENT)
Dept: INTERNAL MEDICINE | Facility: CLINIC | Age: 61
End: 2021-01-13
Payer: COMMERCIAL

## 2021-01-13 ENCOUNTER — TELEPHONE (OUTPATIENT)
Dept: ADMINISTRATIVE | Facility: OTHER | Age: 61
End: 2021-01-13

## 2021-01-13 ENCOUNTER — PATIENT MESSAGE (OUTPATIENT)
Dept: ADMINISTRATIVE | Facility: OTHER | Age: 61
End: 2021-01-13

## 2021-01-13 VITALS — WEIGHT: 169 LBS | TEMPERATURE: 98 F | BODY MASS INDEX: 33.01 KG/M2

## 2021-01-13 DIAGNOSIS — R05.9 COUGH: ICD-10-CM

## 2021-01-13 DIAGNOSIS — I65.23 BILATERAL CAROTID ARTERY STENOSIS: ICD-10-CM

## 2021-01-13 DIAGNOSIS — E78.5 HYPERLIPIDEMIA, UNSPECIFIED HYPERLIPIDEMIA TYPE: ICD-10-CM

## 2021-01-13 DIAGNOSIS — U07.1 COVID-19: ICD-10-CM

## 2021-01-13 DIAGNOSIS — U07.1 COVID-19 VIRUS INFECTION: Primary | ICD-10-CM

## 2021-01-13 PROCEDURE — 1126F PR PAIN SEVERITY QUANTIFIED, NO PAIN PRESENT: ICD-10-PCS | Mod: ,,, | Performed by: FAMILY MEDICINE

## 2021-01-13 PROCEDURE — 1126F AMNT PAIN NOTED NONE PRSNT: CPT | Mod: ,,, | Performed by: FAMILY MEDICINE

## 2021-01-13 PROCEDURE — 3008F PR BODY MASS INDEX (BMI) DOCUMENTED: ICD-10-PCS | Mod: CPTII,,, | Performed by: FAMILY MEDICINE

## 2021-01-13 PROCEDURE — 99214 OFFICE O/P EST MOD 30 MIN: CPT | Mod: 95,,, | Performed by: FAMILY MEDICINE

## 2021-01-13 PROCEDURE — 3008F BODY MASS INDEX DOCD: CPT | Mod: CPTII,,, | Performed by: FAMILY MEDICINE

## 2021-01-13 PROCEDURE — 99214 PR OFFICE/OUTPT VISIT, EST, LEVL IV, 30-39 MIN: ICD-10-PCS | Mod: 95,,, | Performed by: FAMILY MEDICINE

## 2021-01-13 RX ORDER — ALBUTEROL SULFATE 90 UG/1
2 AEROSOL, METERED RESPIRATORY (INHALATION) EVERY 6 HOURS PRN
Qty: 18 G | Refills: 0 | Status: SHIPPED | OUTPATIENT
Start: 2021-01-13 | End: 2023-01-09 | Stop reason: ALTCHOICE

## 2021-01-13 RX ORDER — PRAVASTATIN SODIUM 40 MG/1
40 TABLET ORAL DAILY
Qty: 90 TABLET | Refills: 1 | Status: SHIPPED | OUTPATIENT
Start: 2021-01-13 | End: 2021-08-02 | Stop reason: SDUPTHER

## 2021-01-13 RX ORDER — BENZONATATE 200 MG/1
200 CAPSULE ORAL 3 TIMES DAILY PRN
Qty: 30 CAPSULE | Refills: 0 | Status: SHIPPED | OUTPATIENT
Start: 2021-01-13 | End: 2021-01-23

## 2021-01-13 RX ORDER — AZITHROMYCIN 250 MG/1
TABLET, FILM COATED ORAL
Qty: 6 TABLET | Refills: 0 | Status: SHIPPED | OUTPATIENT
Start: 2021-01-13 | End: 2021-01-18

## 2021-01-13 RX ORDER — PREDNISONE 20 MG/1
40 TABLET ORAL DAILY
Qty: 6 TABLET | Refills: 0 | Status: SHIPPED | OUTPATIENT
Start: 2021-01-13 | End: 2021-01-16

## 2021-01-14 ENCOUNTER — PATIENT MESSAGE (OUTPATIENT)
Dept: ADMINISTRATIVE | Facility: OTHER | Age: 61
End: 2021-01-14

## 2021-01-15 ENCOUNTER — NURSE TRIAGE (OUTPATIENT)
Dept: ADMINISTRATIVE | Facility: CLINIC | Age: 61
End: 2021-01-15

## 2021-01-15 ENCOUNTER — TELEPHONE (OUTPATIENT)
Dept: INTERNAL MEDICINE | Facility: CLINIC | Age: 61
End: 2021-01-15

## 2021-01-15 ENCOUNTER — PATIENT MESSAGE (OUTPATIENT)
Dept: ADMINISTRATIVE | Facility: OTHER | Age: 61
End: 2021-01-15

## 2021-01-16 ENCOUNTER — PATIENT MESSAGE (OUTPATIENT)
Dept: ADMINISTRATIVE | Facility: OTHER | Age: 61
End: 2021-01-16

## 2021-01-16 ENCOUNTER — PATIENT MESSAGE (OUTPATIENT)
Dept: ADMINISTRATIVE | Facility: CLINIC | Age: 61
End: 2021-01-16

## 2021-01-17 ENCOUNTER — PATIENT MESSAGE (OUTPATIENT)
Dept: ADMINISTRATIVE | Facility: OTHER | Age: 61
End: 2021-01-17

## 2021-01-17 ENCOUNTER — PATIENT MESSAGE (OUTPATIENT)
Dept: ADMINISTRATIVE | Facility: CLINIC | Age: 61
End: 2021-01-17

## 2021-01-18 ENCOUNTER — PATIENT MESSAGE (OUTPATIENT)
Dept: ADMINISTRATIVE | Facility: CLINIC | Age: 61
End: 2021-01-18

## 2021-01-18 ENCOUNTER — PATIENT MESSAGE (OUTPATIENT)
Dept: ADMINISTRATIVE | Facility: OTHER | Age: 61
End: 2021-01-18

## 2021-01-19 ENCOUNTER — PATIENT MESSAGE (OUTPATIENT)
Dept: ADMINISTRATIVE | Facility: OTHER | Age: 61
End: 2021-01-19

## 2021-01-20 ENCOUNTER — PATIENT MESSAGE (OUTPATIENT)
Dept: ADMINISTRATIVE | Facility: OTHER | Age: 61
End: 2021-01-20

## 2021-01-20 ENCOUNTER — PATIENT MESSAGE (OUTPATIENT)
Dept: ADMINISTRATIVE | Facility: CLINIC | Age: 61
End: 2021-01-20

## 2021-01-21 ENCOUNTER — PATIENT MESSAGE (OUTPATIENT)
Dept: ADMINISTRATIVE | Facility: CLINIC | Age: 61
End: 2021-01-21

## 2021-01-21 ENCOUNTER — PATIENT MESSAGE (OUTPATIENT)
Dept: ADMINISTRATIVE | Facility: OTHER | Age: 61
End: 2021-01-21

## 2021-01-21 ENCOUNTER — PATIENT MESSAGE (OUTPATIENT)
Dept: INTERNAL MEDICINE | Facility: CLINIC | Age: 61
End: 2021-01-21

## 2021-01-21 DIAGNOSIS — Z76.89 RETURN TO WORK EVALUATION: ICD-10-CM

## 2021-01-21 DIAGNOSIS — U07.1 COVID-19: Primary | ICD-10-CM

## 2021-01-22 ENCOUNTER — PATIENT MESSAGE (OUTPATIENT)
Dept: ADMINISTRATIVE | Facility: OTHER | Age: 61
End: 2021-01-22

## 2021-01-22 ENCOUNTER — PATIENT MESSAGE (OUTPATIENT)
Dept: ADMINISTRATIVE | Facility: CLINIC | Age: 61
End: 2021-01-22

## 2021-01-23 ENCOUNTER — LAB VISIT (OUTPATIENT)
Dept: INTERNAL MEDICINE | Facility: CLINIC | Age: 61
End: 2021-01-23
Payer: COMMERCIAL

## 2021-01-23 ENCOUNTER — PATIENT MESSAGE (OUTPATIENT)
Dept: ADMINISTRATIVE | Facility: OTHER | Age: 61
End: 2021-01-23

## 2021-01-23 ENCOUNTER — PATIENT MESSAGE (OUTPATIENT)
Dept: ADMINISTRATIVE | Facility: CLINIC | Age: 61
End: 2021-01-23

## 2021-01-23 DIAGNOSIS — U07.1 COVID-19: ICD-10-CM

## 2021-01-23 DIAGNOSIS — Z76.89 RETURN TO WORK EVALUATION: ICD-10-CM

## 2021-01-23 PROCEDURE — U0003 INFECTIOUS AGENT DETECTION BY NUCLEIC ACID (DNA OR RNA); SEVERE ACUTE RESPIRATORY SYNDROME CORONAVIRUS 2 (SARS-COV-2) (CORONAVIRUS DISEASE [COVID-19]), AMPLIFIED PROBE TECHNIQUE, MAKING USE OF HIGH THROUGHPUT TECHNOLOGIES AS DESCRIBED BY CMS-2020-01-R: HCPCS

## 2021-01-24 ENCOUNTER — PATIENT MESSAGE (OUTPATIENT)
Dept: ADMINISTRATIVE | Facility: CLINIC | Age: 61
End: 2021-01-24

## 2021-01-24 ENCOUNTER — PATIENT MESSAGE (OUTPATIENT)
Dept: ADMINISTRATIVE | Facility: OTHER | Age: 61
End: 2021-01-24

## 2021-01-24 LAB — SARS-COV-2 RNA RESP QL NAA+PROBE: NOT DETECTED

## 2021-01-25 ENCOUNTER — PATIENT MESSAGE (OUTPATIENT)
Dept: ADMINISTRATIVE | Facility: CLINIC | Age: 61
End: 2021-01-25

## 2021-01-25 ENCOUNTER — PATIENT MESSAGE (OUTPATIENT)
Dept: ADMINISTRATIVE | Facility: OTHER | Age: 61
End: 2021-01-25

## 2021-01-29 ENCOUNTER — TELEPHONE (OUTPATIENT)
Dept: INTERNAL MEDICINE | Facility: CLINIC | Age: 61
End: 2021-01-29

## 2021-02-08 ENCOUNTER — OFFICE VISIT (OUTPATIENT)
Dept: INTERNAL MEDICINE | Facility: CLINIC | Age: 61
End: 2021-02-08
Payer: COMMERCIAL

## 2021-02-08 VITALS — HEART RATE: 82 BPM | WEIGHT: 173 LBS | BODY MASS INDEX: 33.96 KG/M2 | OXYGEN SATURATION: 97 % | HEIGHT: 60 IN

## 2021-02-08 DIAGNOSIS — R73.03 PREDIABETES: ICD-10-CM

## 2021-02-08 DIAGNOSIS — Z86.16 HISTORY OF COVID-19: ICD-10-CM

## 2021-02-08 DIAGNOSIS — R00.2 PALPITATIONS: Primary | ICD-10-CM

## 2021-02-08 PROCEDURE — 99214 PR OFFICE/OUTPT VISIT, EST, LEVL IV, 30-39 MIN: ICD-10-PCS | Mod: 95,,, | Performed by: FAMILY MEDICINE

## 2021-02-08 PROCEDURE — 1126F AMNT PAIN NOTED NONE PRSNT: CPT | Mod: ,,, | Performed by: FAMILY MEDICINE

## 2021-02-08 PROCEDURE — 99214 OFFICE O/P EST MOD 30 MIN: CPT | Mod: 95,,, | Performed by: FAMILY MEDICINE

## 2021-02-08 PROCEDURE — 3008F BODY MASS INDEX DOCD: CPT | Mod: CPTII,,, | Performed by: FAMILY MEDICINE

## 2021-02-08 PROCEDURE — 1126F PR PAIN SEVERITY QUANTIFIED, NO PAIN PRESENT: ICD-10-PCS | Mod: ,,, | Performed by: FAMILY MEDICINE

## 2021-02-08 PROCEDURE — 3008F PR BODY MASS INDEX (BMI) DOCUMENTED: ICD-10-PCS | Mod: CPTII,,, | Performed by: FAMILY MEDICINE

## 2021-02-10 ENCOUNTER — TELEPHONE (OUTPATIENT)
Dept: INTERNAL MEDICINE | Facility: CLINIC | Age: 61
End: 2021-02-10

## 2021-02-12 ENCOUNTER — HOSPITAL ENCOUNTER (OUTPATIENT)
Dept: CARDIOLOGY | Facility: HOSPITAL | Age: 61
Discharge: HOME | End: 2021-02-12
Attending: FAMILY MEDICINE
Payer: COMMERCIAL

## 2021-02-12 DIAGNOSIS — Z86.16 HISTORY OF COVID-19: ICD-10-CM

## 2021-02-12 DIAGNOSIS — R00.2 PALPITATIONS: ICD-10-CM

## 2021-02-12 PROCEDURE — 93227 HOLTER MONITOR - 48 HOUR (CUPID ONLY): ICD-10-PCS | Mod: ,,, | Performed by: INTERNAL MEDICINE

## 2021-02-12 PROCEDURE — 93010 EKG 12-LEAD: ICD-10-PCS | Mod: ,,, | Performed by: INTERNAL MEDICINE

## 2021-02-12 PROCEDURE — 93005 ELECTROCARDIOGRAM TRACING: CPT

## 2021-02-12 PROCEDURE — 93225 XTRNL ECG REC<48 HRS REC: CPT

## 2021-02-12 PROCEDURE — 93227 XTRNL ECG REC<48 HR R&I: CPT | Mod: ,,, | Performed by: INTERNAL MEDICINE

## 2021-02-12 PROCEDURE — 93010 ELECTROCARDIOGRAM REPORT: CPT | Mod: ,,, | Performed by: INTERNAL MEDICINE

## 2021-02-17 ENCOUNTER — PATIENT MESSAGE (OUTPATIENT)
Dept: INTERNAL MEDICINE | Facility: CLINIC | Age: 61
End: 2021-02-17

## 2021-02-18 ENCOUNTER — TELEPHONE (OUTPATIENT)
Dept: INTERNAL MEDICINE | Facility: CLINIC | Age: 61
End: 2021-02-18

## 2021-02-18 DIAGNOSIS — R00.2 PALPITATIONS: Primary | ICD-10-CM

## 2021-02-21 LAB
OHS CV EVENT MONITOR DAY: 0
OHS CV HOLTER LENGTH DECIMAL HOURS: 47.98
OHS CV HOLTER LENGTH HOURS: 47
OHS CV HOLTER LENGTH MINUTES: 59

## 2021-03-03 ENCOUNTER — PATIENT OUTREACH (OUTPATIENT)
Dept: ADMINISTRATIVE | Facility: OTHER | Age: 61
End: 2021-03-03

## 2021-03-04 ENCOUNTER — OFFICE VISIT (OUTPATIENT)
Dept: CARDIOLOGY | Facility: CLINIC | Age: 61
End: 2021-03-04
Payer: COMMERCIAL

## 2021-03-04 VITALS
HEIGHT: 60 IN | HEART RATE: 82 BPM | WEIGHT: 177.69 LBS | BODY MASS INDEX: 34.89 KG/M2 | SYSTOLIC BLOOD PRESSURE: 135 MMHG | DIASTOLIC BLOOD PRESSURE: 63 MMHG

## 2021-03-04 DIAGNOSIS — R73.03 PREDIABETES: ICD-10-CM

## 2021-03-04 DIAGNOSIS — R00.2 PALPITATIONS: ICD-10-CM

## 2021-03-04 DIAGNOSIS — I49.3 PVC (PREMATURE VENTRICULAR CONTRACTION): Primary | ICD-10-CM

## 2021-03-04 DIAGNOSIS — I65.23 BILATERAL CAROTID ARTERY STENOSIS: ICD-10-CM

## 2021-03-04 DIAGNOSIS — E78.5 HYPERLIPIDEMIA, UNSPECIFIED HYPERLIPIDEMIA TYPE: ICD-10-CM

## 2021-03-04 PROCEDURE — 99204 OFFICE O/P NEW MOD 45 MIN: CPT | Mod: S$GLB,,, | Performed by: INTERNAL MEDICINE

## 2021-03-04 PROCEDURE — 99999 PR PBB SHADOW E&M-EST. PATIENT-LVL IV: CPT | Mod: PBBFAC,,, | Performed by: INTERNAL MEDICINE

## 2021-03-04 PROCEDURE — 99999 PR PBB SHADOW E&M-EST. PATIENT-LVL IV: ICD-10-PCS | Mod: PBBFAC,,, | Performed by: INTERNAL MEDICINE

## 2021-03-04 PROCEDURE — 1126F AMNT PAIN NOTED NONE PRSNT: CPT | Mod: S$GLB,,, | Performed by: INTERNAL MEDICINE

## 2021-03-04 PROCEDURE — 1126F PR PAIN SEVERITY QUANTIFIED, NO PAIN PRESENT: ICD-10-PCS | Mod: S$GLB,,, | Performed by: INTERNAL MEDICINE

## 2021-03-04 PROCEDURE — 99204 PR OFFICE/OUTPT VISIT, NEW, LEVL IV, 45-59 MIN: ICD-10-PCS | Mod: S$GLB,,, | Performed by: INTERNAL MEDICINE

## 2021-03-04 PROCEDURE — 3008F BODY MASS INDEX DOCD: CPT | Mod: CPTII,S$GLB,, | Performed by: INTERNAL MEDICINE

## 2021-03-04 PROCEDURE — 3008F PR BODY MASS INDEX (BMI) DOCUMENTED: ICD-10-PCS | Mod: CPTII,S$GLB,, | Performed by: INTERNAL MEDICINE

## 2021-03-09 ENCOUNTER — TELEPHONE (OUTPATIENT)
Dept: INTERNAL MEDICINE | Facility: CLINIC | Age: 61
End: 2021-03-09

## 2021-03-09 ENCOUNTER — HOSPITAL ENCOUNTER (OUTPATIENT)
Dept: CARDIOLOGY | Facility: HOSPITAL | Age: 61
Discharge: HOME | End: 2021-03-09
Attending: INTERNAL MEDICINE
Payer: COMMERCIAL

## 2021-03-09 VITALS — HEIGHT: 60 IN | WEIGHT: 177 LBS | BODY MASS INDEX: 34.75 KG/M2

## 2021-03-09 DIAGNOSIS — R00.2 PALPITATIONS: ICD-10-CM

## 2021-03-09 LAB
ASCENDING AORTA: 2.79 CM
BSA FOR ECHO PROCEDURE: 1.84 M2
CV ECHO LV RWT: 0.3 CM
CV STRESS BASE HR: 88 BPM
DIASTOLIC BLOOD PRESSURE: 72 MMHG
DOP CALC LVOT AREA: 3.1 CM2
DOP CALC LVOT DIAMETER: 1.98 CM
DOP CALC LVOT PEAK VEL: 0.81 M/S
DOP CALC LVOT STROKE VOLUME: 57.49 CM3
DOP CALCLVOT PEAK VEL VTI: 18.68 CM
E WAVE DECELERATION TIME: 237.95 MSEC
E/A RATIO: 0.98
E/E' RATIO: 9.26 M/S
ECHO LV POSTERIOR WALL: 0.72 CM (ref 0.6–1.1)
FRACTIONAL SHORTENING: 30 % (ref 28–44)
INTERVENTRICULAR SEPTUM: 0.76 CM (ref 0.6–1.1)
IVRT: 97.05 MSEC
LA MAJOR: 5.04 CM
LA MINOR: 5.02 CM
LA WIDTH: 3.17 CM
LEFT ATRIUM SIZE: 3.36 CM
LEFT ATRIUM VOLUME INDEX: 25.7 ML/M2
LEFT ATRIUM VOLUME: 45.54 CM3
LEFT INTERNAL DIMENSION IN SYSTOLE: 3.29 CM (ref 2.1–4)
LEFT VENTRICLE DIASTOLIC VOLUME INDEX: 58.57 ML/M2
LEFT VENTRICLE DIASTOLIC VOLUME: 103.67 ML
LEFT VENTRICLE MASS INDEX: 63 G/M2
LEFT VENTRICLE SYSTOLIC VOLUME INDEX: 24.7 ML/M2
LEFT VENTRICLE SYSTOLIC VOLUME: 43.68 ML
LEFT VENTRICULAR INTERNAL DIMENSION IN DIASTOLE: 4.73 CM (ref 3.5–6)
LEFT VENTRICULAR MASS: 111.8 G
LV LATERAL E/E' RATIO: 6.77 M/S
LV SEPTAL E/E' RATIO: 14.67 M/S
MV A" WAVE DURATION": 6.85 MSEC
MV PEAK A VEL: 0.9 M/S
MV PEAK E VEL: 0.88 M/S
MV STENOSIS PRESSURE HALF TIME: 69.01 MS
MV VALVE AREA P 1/2 METHOD: 3.19 CM2
OHS CV CPX 1 MINUTE RECOVERY HEART RATE: 123 BPM
OHS CV CPX 85 PERCENT MAX PREDICTED HEART RATE MALE: 131
OHS CV CPX ESTIMATED METS: 8
OHS CV CPX MAX PREDICTED HEART RATE: 154
OHS CV CPX PATIENT IS FEMALE: 1
OHS CV CPX PATIENT IS MALE: 0
OHS CV CPX PEAK DIASTOLIC BLOOD PRESSURE: 78 MMHG
OHS CV CPX PEAK HEAR RATE: 162 BPM
OHS CV CPX PEAK RATE PRESSURE PRODUCT: NORMAL
OHS CV CPX PEAK SYSTOLIC BLOOD PRESSURE: 229 MMHG
OHS CV CPX PERCENT MAX PREDICTED HEART RATE ACHIEVED: 105
OHS CV CPX RATE PRESSURE PRODUCT PRESENTING: NORMAL
PISA TR MAX VEL: 2.31 M/S
PULM VEIN S/D RATIO: 1.28
PV PEAK D VEL: 0.39 M/S
PV PEAK S VEL: 0.5 M/S
RA MAJOR: 4 CM
RA WIDTH: 3.17 CM
RIGHT VENTRICULAR END-DIASTOLIC DIMENSION: 3.03 CM
RV TISSUE DOPPLER FREE WALL SYSTOLIC VELOCITY 1 (APICAL 4 CHAMBER VIEW): 11.74 CM/S
SINUS: 2.96 CM
STJ: 2.35 CM
STRESS ECHO POST EXERCISE DUR MIN: 5 MINUTES
STRESS ECHO POST EXERCISE DUR SEC: 1 SECONDS
STRESS ST DEPRESSION: 1.5 MM
SYSTOLIC BLOOD PRESSURE: 140 MMHG
TDI LATERAL: 0.13 M/S
TDI SEPTAL: 0.06 M/S
TDI: 0.1 M/S
TR MAX PG: 21 MMHG
TRICUSPID ANNULAR PLANE SYSTOLIC EXCURSION: 2.03 CM

## 2021-03-09 PROCEDURE — 93351 STRESS TTE COMPLETE: CPT | Mod: 26,,, | Performed by: INTERNAL MEDICINE

## 2021-03-09 PROCEDURE — 93351 STRESS ECHO (CUPID ONLY): ICD-10-PCS | Mod: 26,,, | Performed by: INTERNAL MEDICINE

## 2021-03-09 PROCEDURE — 93351 STRESS TTE COMPLETE: CPT

## 2021-03-10 ENCOUNTER — TELEPHONE (OUTPATIENT)
Dept: CARDIOLOGY | Facility: CLINIC | Age: 61
End: 2021-03-10

## 2021-03-10 RX ORDER — DILTIAZEM HYDROCHLORIDE 120 MG/1
120 CAPSULE, EXTENDED RELEASE ORAL DAILY
Qty: 90 CAPSULE | Refills: 3 | Status: SHIPPED | OUTPATIENT
Start: 2021-03-10 | End: 2022-01-25 | Stop reason: SDUPTHER

## 2021-03-15 ENCOUNTER — TELEPHONE (OUTPATIENT)
Dept: INTERNAL MEDICINE | Facility: CLINIC | Age: 61
End: 2021-03-15

## 2021-04-01 ENCOUNTER — TELEPHONE (OUTPATIENT)
Dept: INTERNAL MEDICINE | Facility: CLINIC | Age: 61
End: 2021-04-01

## 2021-04-02 ENCOUNTER — IMMUNIZATION (OUTPATIENT)
Dept: INTERNAL MEDICINE | Facility: CLINIC | Age: 61
End: 2021-04-02

## 2021-04-02 DIAGNOSIS — Z23 NEED FOR VACCINATION: Primary | ICD-10-CM

## 2021-04-02 PROCEDURE — 0011A COVID-19, MRNA, LNP-S, PF, 100 MCG/0.5 ML DOSE VACCINE: ICD-10-PCS | Mod: CV19,S$GLB,, | Performed by: INTERNAL MEDICINE

## 2021-04-02 PROCEDURE — 0011A COVID-19, MRNA, LNP-S, PF, 100 MCG/0.5 ML DOSE VACCINE: CPT | Mod: CV19,S$GLB,, | Performed by: INTERNAL MEDICINE

## 2021-04-02 PROCEDURE — 91301 COVID-19, MRNA, LNP-S, PF, 100 MCG/0.5 ML DOSE VACCINE: CPT | Mod: S$GLB,,, | Performed by: INTERNAL MEDICINE

## 2021-04-02 PROCEDURE — 91301 COVID-19, MRNA, LNP-S, PF, 100 MCG/0.5 ML DOSE VACCINE: ICD-10-PCS | Mod: S$GLB,,, | Performed by: INTERNAL MEDICINE

## 2021-04-20 ENCOUNTER — LAB VISIT (OUTPATIENT)
Dept: LAB | Facility: HOSPITAL | Age: 61
End: 2021-04-20
Attending: FAMILY MEDICINE
Payer: COMMERCIAL

## 2021-04-20 DIAGNOSIS — E78.5 HYPERLIPIDEMIA, UNSPECIFIED HYPERLIPIDEMIA TYPE: ICD-10-CM

## 2021-04-20 DIAGNOSIS — R73.03 PREDIABETES: ICD-10-CM

## 2021-04-20 LAB
CHOLEST SERPL-MCNC: 207 MG/DL (ref 120–199)
CHOLEST/HDLC SERPL: 3.1 {RATIO} (ref 2–5)
ESTIMATED AVG GLUCOSE: 126 MG/DL (ref 68–131)
HBA1C MFR BLD: 6 % (ref 4–5.6)
HDLC SERPL-MCNC: 67 MG/DL (ref 40–75)
HDLC SERPL: 32.4 % (ref 20–50)
LDLC SERPL CALC-MCNC: 130.4 MG/DL (ref 63–159)
NONHDLC SERPL-MCNC: 140 MG/DL
TRIGL SERPL-MCNC: 48 MG/DL (ref 30–150)

## 2021-04-20 PROCEDURE — 80061 LIPID PANEL: CPT | Performed by: FAMILY MEDICINE

## 2021-04-20 PROCEDURE — 36415 COLL VENOUS BLD VENIPUNCTURE: CPT | Performed by: FAMILY MEDICINE

## 2021-04-20 PROCEDURE — 83036 HEMOGLOBIN GLYCOSYLATED A1C: CPT | Performed by: FAMILY MEDICINE

## 2021-04-21 ENCOUNTER — PATIENT MESSAGE (OUTPATIENT)
Dept: INTERNAL MEDICINE | Facility: CLINIC | Age: 61
End: 2021-04-21

## 2021-04-27 ENCOUNTER — OFFICE VISIT (OUTPATIENT)
Dept: INTERNAL MEDICINE | Facility: CLINIC | Age: 61
End: 2021-04-27
Payer: COMMERCIAL

## 2021-04-27 VITALS
DIASTOLIC BLOOD PRESSURE: 62 MMHG | TEMPERATURE: 98 F | WEIGHT: 180.88 LBS | OXYGEN SATURATION: 98 % | HEART RATE: 79 BPM | SYSTOLIC BLOOD PRESSURE: 122 MMHG | HEIGHT: 60 IN | BODY MASS INDEX: 35.51 KG/M2

## 2021-04-27 DIAGNOSIS — Z01.00 EYE EXAM, ROUTINE: ICD-10-CM

## 2021-04-27 DIAGNOSIS — E78.5 HYPERLIPIDEMIA, UNSPECIFIED HYPERLIPIDEMIA TYPE: Primary | ICD-10-CM

## 2021-04-27 DIAGNOSIS — L98.9 SKIN LESION: ICD-10-CM

## 2021-04-27 DIAGNOSIS — I65.23 BILATERAL CAROTID ARTERY STENOSIS: ICD-10-CM

## 2021-04-27 DIAGNOSIS — Z12.83 SKIN CANCER SCREENING: ICD-10-CM

## 2021-04-27 DIAGNOSIS — R73.03 PREDIABETES: ICD-10-CM

## 2021-04-27 DIAGNOSIS — Z86.16 HISTORY OF COVID-19: ICD-10-CM

## 2021-04-27 PROCEDURE — 1126F AMNT PAIN NOTED NONE PRSNT: CPT | Mod: S$GLB,,, | Performed by: FAMILY MEDICINE

## 2021-04-27 PROCEDURE — 99999 PR PBB SHADOW E&M-EST. PATIENT-LVL V: CPT | Mod: PBBFAC,,, | Performed by: FAMILY MEDICINE

## 2021-04-27 PROCEDURE — 99214 OFFICE O/P EST MOD 30 MIN: CPT | Mod: S$GLB,,, | Performed by: FAMILY MEDICINE

## 2021-04-27 PROCEDURE — 3008F PR BODY MASS INDEX (BMI) DOCUMENTED: ICD-10-PCS | Mod: CPTII,S$GLB,, | Performed by: FAMILY MEDICINE

## 2021-04-27 PROCEDURE — 99214 PR OFFICE/OUTPT VISIT, EST, LEVL IV, 30-39 MIN: ICD-10-PCS | Mod: S$GLB,,, | Performed by: FAMILY MEDICINE

## 2021-04-27 PROCEDURE — 3008F BODY MASS INDEX DOCD: CPT | Mod: CPTII,S$GLB,, | Performed by: FAMILY MEDICINE

## 2021-04-27 PROCEDURE — 99999 PR PBB SHADOW E&M-EST. PATIENT-LVL V: ICD-10-PCS | Mod: PBBFAC,,, | Performed by: FAMILY MEDICINE

## 2021-04-27 PROCEDURE — 1126F PR PAIN SEVERITY QUANTIFIED, NO PAIN PRESENT: ICD-10-PCS | Mod: S$GLB,,, | Performed by: FAMILY MEDICINE

## 2021-04-27 RX ORDER — METFORMIN HYDROCHLORIDE 500 MG/1
500 TABLET, EXTENDED RELEASE ORAL
Qty: 90 TABLET | Refills: 3 | Status: SHIPPED | OUTPATIENT
Start: 2021-04-27 | End: 2022-01-03 | Stop reason: SDUPTHER

## 2021-04-27 RX ORDER — PNV NO.95/FERROUS FUM/FOLIC AC 28MG-0.8MG
100 TABLET ORAL DAILY
COMMUNITY

## 2021-04-30 ENCOUNTER — IMMUNIZATION (OUTPATIENT)
Dept: INTERNAL MEDICINE | Facility: CLINIC | Age: 61
End: 2021-04-30
Payer: COMMERCIAL

## 2021-04-30 DIAGNOSIS — Z23 NEED FOR VACCINATION: Primary | ICD-10-CM

## 2021-04-30 PROCEDURE — 0012A COVID-19, MRNA, LNP-S, PF, 100 MCG/0.5 ML DOSE VACCINE: CPT | Mod: PBBFAC | Performed by: INTERNAL MEDICINE

## 2021-05-06 ENCOUNTER — TELEPHONE (OUTPATIENT)
Dept: INTERNAL MEDICINE | Facility: CLINIC | Age: 61
End: 2021-05-06

## 2021-05-19 ENCOUNTER — CLINICAL SUPPORT (OUTPATIENT)
Dept: INTERNAL MEDICINE | Facility: CLINIC | Age: 61
End: 2021-05-19
Payer: COMMERCIAL

## 2021-05-19 VITALS — WEIGHT: 180.31 LBS | BODY MASS INDEX: 35.22 KG/M2

## 2021-06-04 ENCOUNTER — TELEPHONE (OUTPATIENT)
Dept: OPTOMETRY | Facility: CLINIC | Age: 61
End: 2021-06-04

## 2021-07-13 ENCOUNTER — OFFICE VISIT (OUTPATIENT)
Dept: DERMATOLOGY | Facility: CLINIC | Age: 61
End: 2021-07-13
Payer: COMMERCIAL

## 2021-07-13 ENCOUNTER — OFFICE VISIT (OUTPATIENT)
Dept: OPTOMETRY | Facility: CLINIC | Age: 61
End: 2021-07-13
Payer: COMMERCIAL

## 2021-07-13 DIAGNOSIS — Z01.00 EYE EXAM, ROUTINE: Primary | ICD-10-CM

## 2021-07-13 DIAGNOSIS — L91.8 CUTANEOUS SKIN TAGS: ICD-10-CM

## 2021-07-13 DIAGNOSIS — H25.13 SENILE NUCLEAR SCLEROSIS, BILATERAL: ICD-10-CM

## 2021-07-13 DIAGNOSIS — H52.4 HYPEROPIA WITH PRESBYOPIA OF BOTH EYES: ICD-10-CM

## 2021-07-13 DIAGNOSIS — L82.1 SK (SEBORRHEIC KERATOSIS): Primary | ICD-10-CM

## 2021-07-13 DIAGNOSIS — H52.03 HYPEROPIA WITH PRESBYOPIA OF BOTH EYES: ICD-10-CM

## 2021-07-13 PROBLEM — L98.9 SKIN LESION: Status: ACTIVE | Noted: 2021-07-13

## 2021-07-13 PROBLEM — L98.9 SKIN LESION: Status: RESOLVED | Noted: 2021-07-13 | Resolved: 2021-07-13

## 2021-07-13 PROCEDURE — 92014 COMPRE OPH EXAM EST PT 1/>: CPT | Mod: S$GLB,,, | Performed by: OPTOMETRIST

## 2021-07-13 PROCEDURE — 99999 PR PBB SHADOW E&M-EST. PATIENT-LVL III: CPT | Mod: PBBFAC,,, | Performed by: DERMATOLOGY

## 2021-07-13 PROCEDURE — 99999 PR PBB SHADOW E&M-EST. PATIENT-LVL III: CPT | Mod: PBBFAC,,, | Performed by: OPTOMETRIST

## 2021-07-13 PROCEDURE — 92014 PR EYE EXAM, EST PATIENT,COMPREHESV: ICD-10-PCS | Mod: S$GLB,,, | Performed by: OPTOMETRIST

## 2021-07-13 PROCEDURE — 99999 PR PBB SHADOW E&M-EST. PATIENT-LVL III: ICD-10-PCS | Mod: PBBFAC,,, | Performed by: DERMATOLOGY

## 2021-07-13 PROCEDURE — 99202 PR OFFICE/OUTPT VISIT, NEW, LEVL II, 15-29 MIN: ICD-10-PCS | Mod: S$GLB,,, | Performed by: DERMATOLOGY

## 2021-07-13 PROCEDURE — 1126F AMNT PAIN NOTED NONE PRSNT: CPT | Mod: S$GLB,,, | Performed by: DERMATOLOGY

## 2021-07-13 PROCEDURE — 1126F PR PAIN SEVERITY QUANTIFIED, NO PAIN PRESENT: ICD-10-PCS | Mod: S$GLB,,, | Performed by: DERMATOLOGY

## 2021-07-13 PROCEDURE — 99999 PR PBB SHADOW E&M-EST. PATIENT-LVL III: ICD-10-PCS | Mod: PBBFAC,,, | Performed by: OPTOMETRIST

## 2021-07-13 PROCEDURE — 99202 OFFICE O/P NEW SF 15 MIN: CPT | Mod: S$GLB,,, | Performed by: DERMATOLOGY

## 2021-07-31 ENCOUNTER — LAB VISIT (OUTPATIENT)
Dept: PRIMARY CARE CLINIC | Facility: CLINIC | Age: 61
End: 2021-07-31
Payer: COMMERCIAL

## 2021-07-31 ENCOUNTER — LAB VISIT (OUTPATIENT)
Dept: LAB | Facility: HOSPITAL | Age: 61
End: 2021-07-31
Attending: FAMILY MEDICINE
Payer: COMMERCIAL

## 2021-07-31 DIAGNOSIS — R73.03 PREDIABETES: ICD-10-CM

## 2021-07-31 DIAGNOSIS — R05.9 COUGH: ICD-10-CM

## 2021-07-31 DIAGNOSIS — R50.9 FEVER: ICD-10-CM

## 2021-07-31 LAB
ESTIMATED AVG GLUCOSE: 117 MG/DL (ref 68–131)
HBA1C MFR BLD: 5.7 % (ref 4–5.6)

## 2021-07-31 PROCEDURE — U0005 INFEC AGEN DETEC AMPLI PROBE: HCPCS | Performed by: INTERNAL MEDICINE

## 2021-07-31 PROCEDURE — 83036 HEMOGLOBIN GLYCOSYLATED A1C: CPT | Performed by: FAMILY MEDICINE

## 2021-07-31 PROCEDURE — 36415 COLL VENOUS BLD VENIPUNCTURE: CPT | Performed by: FAMILY MEDICINE

## 2021-07-31 PROCEDURE — U0003 INFECTIOUS AGENT DETECTION BY NUCLEIC ACID (DNA OR RNA); SEVERE ACUTE RESPIRATORY SYNDROME CORONAVIRUS 2 (SARS-COV-2) (CORONAVIRUS DISEASE [COVID-19]), AMPLIFIED PROBE TECHNIQUE, MAKING USE OF HIGH THROUGHPUT TECHNOLOGIES AS DESCRIBED BY CMS-2020-01-R: HCPCS | Performed by: INTERNAL MEDICINE

## 2021-08-02 ENCOUNTER — OFFICE VISIT (OUTPATIENT)
Dept: INTERNAL MEDICINE | Facility: CLINIC | Age: 61
End: 2021-08-02
Payer: COMMERCIAL

## 2021-08-02 VITALS — HEIGHT: 60 IN | BODY MASS INDEX: 35.31 KG/M2 | WEIGHT: 179.88 LBS

## 2021-08-02 DIAGNOSIS — Z12.11 SCREEN FOR COLON CANCER: ICD-10-CM

## 2021-08-02 DIAGNOSIS — E78.5 HYPERLIPIDEMIA, UNSPECIFIED HYPERLIPIDEMIA TYPE: Primary | ICD-10-CM

## 2021-08-02 DIAGNOSIS — I65.23 BILATERAL CAROTID ARTERY STENOSIS: ICD-10-CM

## 2021-08-02 DIAGNOSIS — Z86.16 HISTORY OF COVID-19: ICD-10-CM

## 2021-08-02 DIAGNOSIS — R00.2 PALPITATIONS: ICD-10-CM

## 2021-08-02 DIAGNOSIS — R73.03 PREDIABETES: ICD-10-CM

## 2021-08-02 LAB
SARS-COV-2 RNA RESP QL NAA+PROBE: NOT DETECTED
SARS-COV-2- CYCLE NUMBER: -1

## 2021-08-02 PROCEDURE — 1126F PR PAIN SEVERITY QUANTIFIED, NO PAIN PRESENT: ICD-10-PCS | Mod: CPTII,,, | Performed by: FAMILY MEDICINE

## 2021-08-02 PROCEDURE — 99213 OFFICE O/P EST LOW 20 MIN: CPT | Mod: 95,,, | Performed by: FAMILY MEDICINE

## 2021-08-02 PROCEDURE — 1160F RVW MEDS BY RX/DR IN RCRD: CPT | Mod: CPTII,,, | Performed by: FAMILY MEDICINE

## 2021-08-02 PROCEDURE — 1159F PR MEDICATION LIST DOCUMENTED IN MEDICAL RECORD: ICD-10-PCS | Mod: CPTII,,, | Performed by: FAMILY MEDICINE

## 2021-08-02 PROCEDURE — 3044F PR MOST RECENT HEMOGLOBIN A1C LEVEL <7.0%: ICD-10-PCS | Mod: CPTII,,, | Performed by: FAMILY MEDICINE

## 2021-08-02 PROCEDURE — 1159F MED LIST DOCD IN RCRD: CPT | Mod: CPTII,,, | Performed by: FAMILY MEDICINE

## 2021-08-02 PROCEDURE — 3008F PR BODY MASS INDEX (BMI) DOCUMENTED: ICD-10-PCS | Mod: CPTII,,, | Performed by: FAMILY MEDICINE

## 2021-08-02 PROCEDURE — 3008F BODY MASS INDEX DOCD: CPT | Mod: CPTII,,, | Performed by: FAMILY MEDICINE

## 2021-08-02 PROCEDURE — 3044F HG A1C LEVEL LT 7.0%: CPT | Mod: CPTII,,, | Performed by: FAMILY MEDICINE

## 2021-08-02 PROCEDURE — 1126F AMNT PAIN NOTED NONE PRSNT: CPT | Mod: CPTII,,, | Performed by: FAMILY MEDICINE

## 2021-08-02 PROCEDURE — 99213 PR OFFICE/OUTPT VISIT, EST, LEVL III, 20-29 MIN: ICD-10-PCS | Mod: 95,,, | Performed by: FAMILY MEDICINE

## 2021-08-02 PROCEDURE — 1160F PR REVIEW ALL MEDS BY PRESCRIBER/CLIN PHARMACIST DOCUMENTED: ICD-10-PCS | Mod: CPTII,,, | Performed by: FAMILY MEDICINE

## 2021-08-02 RX ORDER — PRAVASTATIN SODIUM 40 MG/1
40 TABLET ORAL DAILY
Qty: 90 TABLET | Refills: 1 | Status: SHIPPED | OUTPATIENT
Start: 2021-08-02 | End: 2022-01-03 | Stop reason: SDUPTHER

## 2021-08-03 ENCOUNTER — TELEPHONE (OUTPATIENT)
Dept: INTERNAL MEDICINE | Facility: CLINIC | Age: 61
End: 2021-08-03

## 2021-08-05 ENCOUNTER — CLINICAL SUPPORT (OUTPATIENT)
Dept: URGENT CARE | Facility: CLINIC | Age: 61
End: 2021-08-05
Payer: COMMERCIAL

## 2021-08-05 DIAGNOSIS — Z11.52 ENCOUNTER FOR SCREENING FOR COVID-19: Primary | ICD-10-CM

## 2021-08-05 LAB
CTP QC/QA: YES
SARS-COV-2 RDRP RESP QL NAA+PROBE: NEGATIVE

## 2021-08-05 PROCEDURE — 99211 OFF/OP EST MAY X REQ PHY/QHP: CPT | Mod: S$GLB,,, | Performed by: FAMILY MEDICINE

## 2021-08-05 PROCEDURE — 99211 PR OFFICE/OUTPT VISIT, EST, LEVL I: ICD-10-PCS | Mod: S$GLB,,, | Performed by: FAMILY MEDICINE

## 2021-10-04 ENCOUNTER — PATIENT OUTREACH (OUTPATIENT)
Dept: ADMINISTRATIVE | Facility: OTHER | Age: 61
End: 2021-10-04

## 2021-10-05 ENCOUNTER — OFFICE VISIT (OUTPATIENT)
Dept: OPHTHALMOLOGY | Facility: CLINIC | Age: 61
End: 2021-10-05
Payer: COMMERCIAL

## 2021-10-05 ENCOUNTER — TELEPHONE (OUTPATIENT)
Dept: OPHTHALMOLOGY | Facility: CLINIC | Age: 61
End: 2021-10-05

## 2021-10-05 DIAGNOSIS — H25.12 NUCLEAR SCLEROTIC CATARACT OF LEFT EYE: ICD-10-CM

## 2021-10-05 DIAGNOSIS — H25.11 NUCLEAR SCLEROTIC CATARACT OF RIGHT EYE: Primary | ICD-10-CM

## 2021-10-05 DIAGNOSIS — Z13.9 SCREENING PROCEDURE: Primary | ICD-10-CM

## 2021-10-05 PROCEDURE — 1159F MED LIST DOCD IN RCRD: CPT | Mod: CPTII,S$GLB,, | Performed by: OPHTHALMOLOGY

## 2021-10-05 PROCEDURE — 3044F PR MOST RECENT HEMOGLOBIN A1C LEVEL <7.0%: ICD-10-PCS | Mod: CPTII,S$GLB,, | Performed by: OPHTHALMOLOGY

## 2021-10-05 PROCEDURE — 99204 OFFICE O/P NEW MOD 45 MIN: CPT | Mod: S$GLB,,, | Performed by: OPHTHALMOLOGY

## 2021-10-05 PROCEDURE — 99999 PR PBB SHADOW E&M-EST. PATIENT-LVL II: CPT | Mod: PBBFAC,,, | Performed by: OPHTHALMOLOGY

## 2021-10-05 PROCEDURE — 99204 PR OFFICE/OUTPT VISIT, NEW, LEVL IV, 45-59 MIN: ICD-10-PCS | Mod: S$GLB,,, | Performed by: OPHTHALMOLOGY

## 2021-10-05 PROCEDURE — 3044F HG A1C LEVEL LT 7.0%: CPT | Mod: CPTII,S$GLB,, | Performed by: OPHTHALMOLOGY

## 2021-10-05 PROCEDURE — 92136 OPHTHALMIC BIOMETRY: CPT | Mod: RT,S$GLB,, | Performed by: OPHTHALMOLOGY

## 2021-10-05 PROCEDURE — 99999 PR PBB SHADOW E&M-EST. PATIENT-LVL II: ICD-10-PCS | Mod: PBBFAC,,, | Performed by: OPHTHALMOLOGY

## 2021-10-05 PROCEDURE — 92136 IOL MASTER - OD - RIGHT EYE: ICD-10-PCS | Mod: RT,S$GLB,, | Performed by: OPHTHALMOLOGY

## 2021-10-05 PROCEDURE — 1159F PR MEDICATION LIST DOCUMENTED IN MEDICAL RECORD: ICD-10-PCS | Mod: CPTII,S$GLB,, | Performed by: OPHTHALMOLOGY

## 2021-10-16 ENCOUNTER — LAB VISIT (OUTPATIENT)
Dept: LAB | Facility: HOSPITAL | Age: 61
End: 2021-10-16
Attending: FAMILY MEDICINE
Payer: COMMERCIAL

## 2021-10-16 DIAGNOSIS — R73.03 PREDIABETES: ICD-10-CM

## 2021-10-16 DIAGNOSIS — E78.5 HYPERLIPIDEMIA, UNSPECIFIED HYPERLIPIDEMIA TYPE: ICD-10-CM

## 2021-10-16 DIAGNOSIS — I65.23 BILATERAL CAROTID ARTERY STENOSIS: ICD-10-CM

## 2021-10-16 DIAGNOSIS — R00.2 PALPITATIONS: ICD-10-CM

## 2021-10-16 LAB
ALBUMIN SERPL BCP-MCNC: 3.8 G/DL (ref 3.5–5.2)
ALP SERPL-CCNC: 84 U/L (ref 55–135)
ALT SERPL W/O P-5'-P-CCNC: 15 U/L (ref 10–44)
ANION GAP SERPL CALC-SCNC: 10 MMOL/L (ref 8–16)
AST SERPL-CCNC: 17 U/L (ref 10–40)
BASOPHILS # BLD AUTO: 0.02 K/UL (ref 0–0.2)
BASOPHILS NFR BLD: 0.6 % (ref 0–1.9)
BILIRUB SERPL-MCNC: 0.4 MG/DL (ref 0.1–1)
BUN SERPL-MCNC: 15 MG/DL (ref 6–20)
CALCIUM SERPL-MCNC: 9.6 MG/DL (ref 8.7–10.5)
CHLORIDE SERPL-SCNC: 107 MMOL/L (ref 95–110)
CO2 SERPL-SCNC: 25 MMOL/L (ref 23–29)
CREAT SERPL-MCNC: 0.7 MG/DL (ref 0.5–1.4)
DIFFERENTIAL METHOD: ABNORMAL
EOSINOPHIL # BLD AUTO: 0.1 K/UL (ref 0–0.5)
EOSINOPHIL NFR BLD: 2.8 % (ref 0–8)
ERYTHROCYTE [DISTWIDTH] IN BLOOD BY AUTOMATED COUNT: 14.6 % (ref 11.5–14.5)
EST. GFR  (AFRICAN AMERICAN): >60 ML/MIN/1.73 M^2
EST. GFR  (NON AFRICAN AMERICAN): >60 ML/MIN/1.73 M^2
ESTIMATED AVG GLUCOSE: 123 MG/DL (ref 68–131)
GLUCOSE SERPL-MCNC: 95 MG/DL (ref 70–110)
HBA1C MFR BLD: 5.9 % (ref 4–5.6)
HCT VFR BLD AUTO: 38.6 % (ref 37–48.5)
HGB BLD-MCNC: 12.4 G/DL (ref 12–16)
IMM GRANULOCYTES # BLD AUTO: 0.01 K/UL (ref 0–0.04)
IMM GRANULOCYTES NFR BLD AUTO: 0.3 % (ref 0–0.5)
LYMPHOCYTES # BLD AUTO: 1.3 K/UL (ref 1–4.8)
LYMPHOCYTES NFR BLD: 41.9 % (ref 18–48)
MCH RBC QN AUTO: 28.6 PG (ref 27–31)
MCHC RBC AUTO-ENTMCNC: 32.1 G/DL (ref 32–36)
MCV RBC AUTO: 89 FL (ref 82–98)
MONOCYTES # BLD AUTO: 0.3 K/UL (ref 0.3–1)
MONOCYTES NFR BLD: 8.1 % (ref 4–15)
NEUTROPHILS # BLD AUTO: 1.5 K/UL (ref 1.8–7.7)
NEUTROPHILS NFR BLD: 46.3 % (ref 38–73)
NRBC BLD-RTO: 0 /100 WBC
PLATELET # BLD AUTO: 311 K/UL (ref 150–450)
PMV BLD AUTO: 11.7 FL (ref 9.2–12.9)
POTASSIUM SERPL-SCNC: 3.9 MMOL/L (ref 3.5–5.1)
PROT SERPL-MCNC: 7.4 G/DL (ref 6–8.4)
RBC # BLD AUTO: 4.34 M/UL (ref 4–5.4)
SODIUM SERPL-SCNC: 142 MMOL/L (ref 136–145)
VIT B12 SERPL-MCNC: 639 PG/ML (ref 210–950)
WBC # BLD AUTO: 3.2 K/UL (ref 3.9–12.7)

## 2021-10-16 PROCEDURE — 82607 VITAMIN B-12: CPT | Performed by: FAMILY MEDICINE

## 2021-10-16 PROCEDURE — 85025 COMPLETE CBC W/AUTO DIFF WBC: CPT | Performed by: FAMILY MEDICINE

## 2021-10-16 PROCEDURE — 80053 COMPREHEN METABOLIC PANEL: CPT | Performed by: FAMILY MEDICINE

## 2021-10-16 PROCEDURE — 83036 HEMOGLOBIN GLYCOSYLATED A1C: CPT | Performed by: FAMILY MEDICINE

## 2021-10-16 PROCEDURE — 36415 COLL VENOUS BLD VENIPUNCTURE: CPT | Performed by: FAMILY MEDICINE

## 2021-10-19 ENCOUNTER — PATIENT MESSAGE (OUTPATIENT)
Dept: INTERNAL MEDICINE | Facility: CLINIC | Age: 61
End: 2021-10-19
Payer: COMMERCIAL

## 2021-10-22 DIAGNOSIS — H25.11 NUCLEAR SCLEROTIC CATARACT OF RIGHT EYE: Primary | ICD-10-CM

## 2021-10-22 RX ORDER — PREDNISOLONE ACETATE-GATIFLOXACIN-BROMFENAC .75; 5; 1 MG/ML; MG/ML; MG/ML
1 SUSPENSION/ DROPS OPHTHALMIC 3 TIMES DAILY
Qty: 5 ML | Refills: 3 | Status: SHIPPED | OUTPATIENT
Start: 2021-10-22 | End: 2023-01-09 | Stop reason: ALTCHOICE

## 2021-11-11 ENCOUNTER — TELEPHONE (OUTPATIENT)
Dept: OPHTHALMOLOGY | Facility: CLINIC | Age: 61
End: 2021-11-11
Payer: COMMERCIAL

## 2021-11-11 DIAGNOSIS — H25.12 NUCLEAR SCLEROTIC CATARACT OF LEFT EYE: Primary | ICD-10-CM

## 2021-11-19 ENCOUNTER — ANESTHESIA EVENT (OUTPATIENT)
Dept: SURGERY | Facility: HOSPITAL | Age: 61
End: 2021-11-19
Payer: COMMERCIAL

## 2021-11-22 ENCOUNTER — ANESTHESIA (OUTPATIENT)
Dept: SURGERY | Facility: HOSPITAL | Age: 61
End: 2021-11-22
Payer: COMMERCIAL

## 2021-11-22 ENCOUNTER — HOSPITAL ENCOUNTER (OUTPATIENT)
Facility: HOSPITAL | Age: 61
Discharge: HOME | End: 2021-11-22
Attending: OPHTHALMOLOGY | Admitting: OPHTHALMOLOGY
Payer: COMMERCIAL

## 2021-11-22 VITALS
BODY MASS INDEX: 35.67 KG/M2 | RESPIRATION RATE: 18 BRPM | OXYGEN SATURATION: 99 % | SYSTOLIC BLOOD PRESSURE: 142 MMHG | DIASTOLIC BLOOD PRESSURE: 68 MMHG | TEMPERATURE: 97 F | WEIGHT: 181.69 LBS | HEART RATE: 82 BPM | HEIGHT: 60 IN

## 2021-11-22 DIAGNOSIS — H25.11 NUCLEAR SCLEROTIC CATARACT OF RIGHT EYE: Primary | ICD-10-CM

## 2021-11-22 DIAGNOSIS — H25.10 SENILE NUCLEAR SCLEROSIS: ICD-10-CM

## 2021-11-22 LAB — GLUCOSE SERPL-MCNC: 100 MG/DL (ref 70–110)

## 2021-11-22 PROCEDURE — 63600175 PHARM REV CODE 636 W HCPCS: Mod: PO | Performed by: NURSE ANESTHETIST, CERTIFIED REGISTERED

## 2021-11-22 PROCEDURE — 82962 GLUCOSE BLOOD TEST: CPT | Mod: PO | Performed by: OPHTHALMOLOGY

## 2021-11-22 PROCEDURE — 71000033 HC RECOVERY, INTIAL HOUR: Mod: PO | Performed by: OPHTHALMOLOGY

## 2021-11-22 PROCEDURE — D9220A PRA ANESTHESIA: Mod: CRNA,,, | Performed by: NURSE ANESTHETIST, CERTIFIED REGISTERED

## 2021-11-22 PROCEDURE — 36000707: Mod: PO | Performed by: OPHTHALMOLOGY

## 2021-11-22 PROCEDURE — D9220A PRA ANESTHESIA: ICD-10-PCS | Mod: CRNA,,, | Performed by: NURSE ANESTHETIST, CERTIFIED REGISTERED

## 2021-11-22 PROCEDURE — D9220A PRA ANESTHESIA: ICD-10-PCS | Mod: ANES,,, | Performed by: ANESTHESIOLOGY

## 2021-11-22 PROCEDURE — 36000706: Mod: PO | Performed by: OPHTHALMOLOGY

## 2021-11-22 PROCEDURE — 66982 XCAPSL CTRC RMVL CPLX WO ECP: CPT | Mod: RT,,, | Performed by: OPHTHALMOLOGY

## 2021-11-22 PROCEDURE — 25000003 PHARM REV CODE 250: Mod: PO | Performed by: OPHTHALMOLOGY

## 2021-11-22 PROCEDURE — D9220A PRA ANESTHESIA: Mod: ANES,,, | Performed by: ANESTHESIOLOGY

## 2021-11-22 PROCEDURE — 66982 PR REMOVAL, CATARACT, W/INSRT INTRAOC LENS, W/O ENDO CYCLO, CMPLX: ICD-10-PCS | Mod: RT,,, | Performed by: OPHTHALMOLOGY

## 2021-11-22 PROCEDURE — 37000008 HC ANESTHESIA 1ST 15 MINUTES: Mod: PO | Performed by: OPHTHALMOLOGY

## 2021-11-22 PROCEDURE — 25000003 PHARM REV CODE 250: Mod: PO

## 2021-11-22 PROCEDURE — 63600175 PHARM REV CODE 636 W HCPCS: Mod: PO | Performed by: OPHTHALMOLOGY

## 2021-11-22 PROCEDURE — 37000009 HC ANESTHESIA EA ADD 15 MINS: Mod: PO | Performed by: OPHTHALMOLOGY

## 2021-11-22 PROCEDURE — V2632 POST CHMBR INTRAOCULAR LENS: HCPCS | Mod: PO | Performed by: OPHTHALMOLOGY

## 2021-11-22 PROCEDURE — 63600175 PHARM REV CODE 636 W HCPCS: Mod: PO | Performed by: ANESTHESIOLOGY

## 2021-11-22 DEVICE — LENS EYHANCE +20.0D: Type: IMPLANTABLE DEVICE | Site: EYE | Status: FUNCTIONAL

## 2021-11-22 RX ORDER — PROPARACAINE HYDROCHLORIDE 5 MG/ML
1 SOLUTION/ DROPS OPHTHALMIC
Status: DISCONTINUED | OUTPATIENT
Start: 2021-11-22 | End: 2021-11-22 | Stop reason: HOSPADM

## 2021-11-22 RX ORDER — OFLOXACIN 3 MG/ML
1 SOLUTION/ DROPS OPHTHALMIC
Status: COMPLETED | OUTPATIENT
Start: 2021-11-22 | End: 2021-11-22

## 2021-11-22 RX ORDER — LIDOCAINE HYDROCHLORIDE 40 MG/ML
INJECTION, SOLUTION RETROBULBAR
Status: DISCONTINUED | OUTPATIENT
Start: 2021-11-22 | End: 2021-11-22 | Stop reason: HOSPADM

## 2021-11-22 RX ORDER — PREDNISOLONE ACETATE 10 MG/ML
SUSPENSION/ DROPS OPHTHALMIC
Status: DISCONTINUED | OUTPATIENT
Start: 2021-11-22 | End: 2021-11-22 | Stop reason: HOSPADM

## 2021-11-22 RX ORDER — ACETAMINOPHEN 325 MG/1
650 TABLET ORAL EVERY 4 HOURS PRN
Status: DISCONTINUED | OUTPATIENT
Start: 2021-11-22 | End: 2021-11-22 | Stop reason: HOSPADM

## 2021-11-22 RX ORDER — ONDANSETRON 2 MG/ML
INJECTION INTRAMUSCULAR; INTRAVENOUS
Status: DISCONTINUED | OUTPATIENT
Start: 2021-11-22 | End: 2021-11-22

## 2021-11-22 RX ORDER — LIDOCAINE HYDROCHLORIDE 10 MG/ML
1 INJECTION, SOLUTION EPIDURAL; INFILTRATION; INTRACAUDAL; PERINEURAL ONCE
Status: ACTIVE | OUTPATIENT
Start: 2021-11-22

## 2021-11-22 RX ORDER — MOXIFLOXACIN 5 MG/ML
SOLUTION/ DROPS OPHTHALMIC
Status: DISCONTINUED | OUTPATIENT
Start: 2021-11-22 | End: 2021-11-22 | Stop reason: HOSPADM

## 2021-11-22 RX ORDER — KETOROLAC TROMETHAMINE 5 MG/ML
1 SOLUTION OPHTHALMIC ONCE
Status: COMPLETED | OUTPATIENT
Start: 2021-11-22 | End: 2021-11-22

## 2021-11-22 RX ORDER — MIDAZOLAM HYDROCHLORIDE 1 MG/ML
INJECTION INTRAMUSCULAR; INTRAVENOUS
Status: DISCONTINUED | OUTPATIENT
Start: 2021-11-22 | End: 2021-11-22

## 2021-11-22 RX ORDER — LIDOCAINE HYDROCHLORIDE 40 MG/ML
1 INJECTION, SOLUTION RETROBULBAR
Status: DISCONTINUED | OUTPATIENT
Start: 2021-11-22 | End: 2021-11-22 | Stop reason: HOSPADM

## 2021-11-22 RX ORDER — LIDOCAINE HYDROCHLORIDE 10 MG/ML
INJECTION, SOLUTION EPIDURAL; INFILTRATION; INTRACAUDAL; PERINEURAL
Status: DISCONTINUED | OUTPATIENT
Start: 2021-11-22 | End: 2021-11-22 | Stop reason: HOSPADM

## 2021-11-22 RX ORDER — TROPICAMIDE 10 MG/ML
1 SOLUTION/ DROPS OPHTHALMIC
Status: COMPLETED | OUTPATIENT
Start: 2021-11-22 | End: 2021-11-22

## 2021-11-22 RX ORDER — SODIUM CHLORIDE, SODIUM LACTATE, POTASSIUM CHLORIDE, CALCIUM CHLORIDE 600; 310; 30; 20 MG/100ML; MG/100ML; MG/100ML; MG/100ML
INJECTION, SOLUTION INTRAVENOUS CONTINUOUS
Status: DISPENSED | OUTPATIENT
Start: 2021-11-22

## 2021-11-22 RX ORDER — PHENYLEPHRINE HYDROCHLORIDE 25 MG/ML
1 SOLUTION/ DROPS OPHTHALMIC
Status: COMPLETED | OUTPATIENT
Start: 2021-11-22 | End: 2021-11-22

## 2021-11-22 RX ADMIN — PHENYLEPHRINE HYDROCHLORIDE 1 DROP: 25 SOLUTION/ DROPS OPHTHALMIC at 09:11

## 2021-11-22 RX ADMIN — OFLOXACIN 1 DROP: 3 SOLUTION/ DROPS OPHTHALMIC at 09:11

## 2021-11-22 RX ADMIN — TROPICAMIDE 1 DROP: 10 SOLUTION/ DROPS OPHTHALMIC at 09:11

## 2021-11-22 RX ADMIN — KETOROLAC TROMETHAMINE 1 DROP: 5 SOLUTION OPHTHALMIC at 09:11

## 2021-11-22 RX ADMIN — MIDAZOLAM HYDROCHLORIDE 2 MG: 1 INJECTION, SOLUTION INTRAMUSCULAR; INTRAVENOUS at 09:11

## 2021-11-22 RX ADMIN — SODIUM CHLORIDE, SODIUM LACTATE, POTASSIUM CHLORIDE, AND CALCIUM CHLORIDE: .6; .31; .03; .02 INJECTION, SOLUTION INTRAVENOUS at 09:11

## 2021-11-22 RX ADMIN — PROPARACAINE HYDROCHLORIDE 1 DROP: 5 SOLUTION/ DROPS OPHTHALMIC at 09:11

## 2021-11-22 RX ADMIN — MIDAZOLAM HYDROCHLORIDE 2 MG: 1 INJECTION, SOLUTION INTRAMUSCULAR; INTRAVENOUS at 10:11

## 2021-11-22 RX ADMIN — ONDANSETRON 4 MG: 2 INJECTION INTRAMUSCULAR; INTRAVENOUS at 09:11

## 2021-11-23 ENCOUNTER — OFFICE VISIT (OUTPATIENT)
Dept: OPHTHALMOLOGY | Facility: CLINIC | Age: 61
End: 2021-11-23
Payer: COMMERCIAL

## 2021-11-23 DIAGNOSIS — Z96.1 STATUS POST CATARACT EXTRACTION AND INSERTION OF INTRAOCULAR LENS, RIGHT: Primary | ICD-10-CM

## 2021-11-23 DIAGNOSIS — Z98.41 STATUS POST CATARACT EXTRACTION AND INSERTION OF INTRAOCULAR LENS, RIGHT: Primary | ICD-10-CM

## 2021-11-23 PROCEDURE — 99024 PR POST-OP FOLLOW-UP VISIT: ICD-10-PCS | Mod: S$GLB,,, | Performed by: OPHTHALMOLOGY

## 2021-11-23 PROCEDURE — 99999 PR PBB SHADOW E&M-EST. PATIENT-LVL III: CPT | Mod: PBBFAC,,, | Performed by: OPHTHALMOLOGY

## 2021-11-23 PROCEDURE — 99024 POSTOP FOLLOW-UP VISIT: CPT | Mod: S$GLB,,, | Performed by: OPHTHALMOLOGY

## 2021-11-23 PROCEDURE — 99999 PR PBB SHADOW E&M-EST. PATIENT-LVL III: ICD-10-PCS | Mod: PBBFAC,,, | Performed by: OPHTHALMOLOGY

## 2021-11-29 ENCOUNTER — TELEPHONE (OUTPATIENT)
Dept: OPHTHALMOLOGY | Facility: CLINIC | Age: 61
End: 2021-11-29
Payer: COMMERCIAL

## 2021-12-07 ENCOUNTER — OFFICE VISIT (OUTPATIENT)
Dept: OPHTHALMOLOGY | Facility: CLINIC | Age: 61
End: 2021-12-07
Payer: COMMERCIAL

## 2021-12-07 DIAGNOSIS — Z96.1 STATUS POST CATARACT EXTRACTION AND INSERTION OF INTRAOCULAR LENS, RIGHT: Primary | ICD-10-CM

## 2021-12-07 DIAGNOSIS — Z98.41 STATUS POST CATARACT EXTRACTION AND INSERTION OF INTRAOCULAR LENS, RIGHT: Primary | ICD-10-CM

## 2021-12-07 DIAGNOSIS — H25.12 NUCLEAR SCLEROTIC CATARACT OF LEFT EYE: ICD-10-CM

## 2021-12-07 PROCEDURE — 99024 POSTOP FOLLOW-UP VISIT: CPT | Mod: S$GLB,,, | Performed by: OPHTHALMOLOGY

## 2021-12-07 PROCEDURE — 92136 IOL MASTER - OU - BOTH EYES: ICD-10-PCS | Mod: LT,S$GLB,, | Performed by: OPHTHALMOLOGY

## 2021-12-07 PROCEDURE — 92136 OPHTHALMIC BIOMETRY: CPT | Mod: LT,S$GLB,, | Performed by: OPHTHALMOLOGY

## 2021-12-07 PROCEDURE — 99999 PR PBB SHADOW E&M-EST. PATIENT-LVL III: CPT | Mod: PBBFAC,,, | Performed by: OPHTHALMOLOGY

## 2021-12-07 PROCEDURE — 99999 PR PBB SHADOW E&M-EST. PATIENT-LVL III: ICD-10-PCS | Mod: PBBFAC,,, | Performed by: OPHTHALMOLOGY

## 2021-12-07 PROCEDURE — 99024 PR POST-OP FOLLOW-UP VISIT: ICD-10-PCS | Mod: S$GLB,,, | Performed by: OPHTHALMOLOGY

## 2021-12-08 ENCOUNTER — TELEPHONE (OUTPATIENT)
Dept: OPHTHALMOLOGY | Facility: CLINIC | Age: 61
End: 2021-12-08
Payer: COMMERCIAL

## 2021-12-10 ENCOUNTER — TELEPHONE (OUTPATIENT)
Dept: OPHTHALMOLOGY | Facility: CLINIC | Age: 61
End: 2021-12-10
Payer: COMMERCIAL

## 2021-12-17 ENCOUNTER — OFFICE VISIT (OUTPATIENT)
Dept: OPHTHALMOLOGY | Facility: CLINIC | Age: 61
End: 2021-12-17
Payer: COMMERCIAL

## 2021-12-17 DIAGNOSIS — H16.8 CHEMICAL KERATITIS: Primary | ICD-10-CM

## 2021-12-17 PROCEDURE — 99214 PR OFFICE/OUTPT VISIT, EST, LEVL IV, 30-39 MIN: ICD-10-PCS | Mod: 24,S$GLB,, | Performed by: OPHTHALMOLOGY

## 2021-12-17 PROCEDURE — 99214 OFFICE O/P EST MOD 30 MIN: CPT | Mod: 24,S$GLB,, | Performed by: OPHTHALMOLOGY

## 2021-12-17 PROCEDURE — 99999 PR PBB SHADOW E&M-EST. PATIENT-LVL I: CPT | Mod: PBBFAC,,, | Performed by: OPHTHALMOLOGY

## 2021-12-17 PROCEDURE — 99999 PR PBB SHADOW E&M-EST. PATIENT-LVL I: ICD-10-PCS | Mod: PBBFAC,,, | Performed by: OPHTHALMOLOGY

## 2022-01-03 ENCOUNTER — OFFICE VISIT (OUTPATIENT)
Dept: PRIMARY CARE CLINIC | Facility: CLINIC | Age: 62
End: 2022-01-03
Payer: COMMERCIAL

## 2022-01-03 ENCOUNTER — LAB VISIT (OUTPATIENT)
Dept: PRIMARY CARE CLINIC | Facility: OTHER | Age: 62
End: 2022-01-03
Attending: INTERNAL MEDICINE
Payer: COMMERCIAL

## 2022-01-03 VITALS
HEART RATE: 75 BPM | WEIGHT: 186.31 LBS | OXYGEN SATURATION: 98 % | SYSTOLIC BLOOD PRESSURE: 131 MMHG | BODY MASS INDEX: 36.58 KG/M2 | HEIGHT: 60 IN | TEMPERATURE: 99 F | DIASTOLIC BLOOD PRESSURE: 70 MMHG

## 2022-01-03 DIAGNOSIS — R00.2 PALPITATIONS: ICD-10-CM

## 2022-01-03 DIAGNOSIS — E78.5 HYPERLIPIDEMIA, UNSPECIFIED HYPERLIPIDEMIA TYPE: ICD-10-CM

## 2022-01-03 DIAGNOSIS — Z00.00 ANNUAL PHYSICAL EXAM: ICD-10-CM

## 2022-01-03 DIAGNOSIS — Z12.11 SCREEN FOR COLON CANCER: ICD-10-CM

## 2022-01-03 DIAGNOSIS — I65.23 BILATERAL CAROTID ARTERY STENOSIS: ICD-10-CM

## 2022-01-03 DIAGNOSIS — Z86.16 HISTORY OF COVID-19: ICD-10-CM

## 2022-01-03 DIAGNOSIS — Z20.822 ENCOUNTER FOR LABORATORY TESTING FOR COVID-19 VIRUS: ICD-10-CM

## 2022-01-03 DIAGNOSIS — R73.03 PREDIABETES: Primary | ICD-10-CM

## 2022-01-03 PROCEDURE — 3008F BODY MASS INDEX DOCD: CPT | Mod: CPTII,S$GLB,, | Performed by: FAMILY MEDICINE

## 2022-01-03 PROCEDURE — U0003 INFECTIOUS AGENT DETECTION BY NUCLEIC ACID (DNA OR RNA); SEVERE ACUTE RESPIRATORY SYNDROME CORONAVIRUS 2 (SARS-COV-2) (CORONAVIRUS DISEASE [COVID-19]), AMPLIFIED PROBE TECHNIQUE, MAKING USE OF HIGH THROUGHPUT TECHNOLOGIES AS DESCRIBED BY CMS-2020-01-R: HCPCS | Performed by: INTERNAL MEDICINE

## 2022-01-03 PROCEDURE — 3075F PR MOST RECENT SYSTOLIC BLOOD PRESS GE 130-139MM HG: ICD-10-PCS | Mod: CPTII,S$GLB,, | Performed by: FAMILY MEDICINE

## 2022-01-03 PROCEDURE — 90471 FLU VACCINE (QUAD) GREATER THAN OR EQUAL TO 3YO PRESERVATIVE FREE IM: ICD-10-PCS | Mod: S$GLB,,, | Performed by: FAMILY MEDICINE

## 2022-01-03 PROCEDURE — 3075F SYST BP GE 130 - 139MM HG: CPT | Mod: CPTII,S$GLB,, | Performed by: FAMILY MEDICINE

## 2022-01-03 PROCEDURE — 1159F MED LIST DOCD IN RCRD: CPT | Mod: CPTII,S$GLB,, | Performed by: FAMILY MEDICINE

## 2022-01-03 PROCEDURE — 3078F PR MOST RECENT DIASTOLIC BLOOD PRESSURE < 80 MM HG: ICD-10-PCS | Mod: CPTII,S$GLB,, | Performed by: FAMILY MEDICINE

## 2022-01-03 PROCEDURE — 99999 PR PBB SHADOW E&M-EST. PATIENT-LVL IV: CPT | Mod: PBBFAC,,, | Performed by: FAMILY MEDICINE

## 2022-01-03 PROCEDURE — 99214 OFFICE O/P EST MOD 30 MIN: CPT | Mod: 25,S$GLB,, | Performed by: FAMILY MEDICINE

## 2022-01-03 PROCEDURE — 99999 PR PBB SHADOW E&M-EST. PATIENT-LVL IV: ICD-10-PCS | Mod: PBBFAC,,, | Performed by: FAMILY MEDICINE

## 2022-01-03 PROCEDURE — 90686 IIV4 VACC NO PRSV 0.5 ML IM: CPT | Mod: S$GLB,,, | Performed by: FAMILY MEDICINE

## 2022-01-03 PROCEDURE — 90471 IMMUNIZATION ADMIN: CPT | Mod: S$GLB,,, | Performed by: FAMILY MEDICINE

## 2022-01-03 PROCEDURE — 99214 PR OFFICE/OUTPT VISIT, EST, LEVL IV, 30-39 MIN: ICD-10-PCS | Mod: 25,S$GLB,, | Performed by: FAMILY MEDICINE

## 2022-01-03 PROCEDURE — 3078F DIAST BP <80 MM HG: CPT | Mod: CPTII,S$GLB,, | Performed by: FAMILY MEDICINE

## 2022-01-03 PROCEDURE — 1159F PR MEDICATION LIST DOCUMENTED IN MEDICAL RECORD: ICD-10-PCS | Mod: CPTII,S$GLB,, | Performed by: FAMILY MEDICINE

## 2022-01-03 PROCEDURE — 3008F PR BODY MASS INDEX (BMI) DOCUMENTED: ICD-10-PCS | Mod: CPTII,S$GLB,, | Performed by: FAMILY MEDICINE

## 2022-01-03 PROCEDURE — 90686 FLU VACCINE (QUAD) GREATER THAN OR EQUAL TO 3YO PRESERVATIVE FREE IM: ICD-10-PCS | Mod: S$GLB,,, | Performed by: FAMILY MEDICINE

## 2022-01-03 RX ORDER — METFORMIN HYDROCHLORIDE 500 MG/1
500 TABLET, EXTENDED RELEASE ORAL
Qty: 90 TABLET | Refills: 3 | Status: SHIPPED | OUTPATIENT
Start: 2022-01-03 | End: 2022-07-06 | Stop reason: SDUPTHER

## 2022-01-03 RX ORDER — PRAVASTATIN SODIUM 40 MG/1
40 TABLET ORAL DAILY
Qty: 90 TABLET | Refills: 1 | Status: SHIPPED | OUTPATIENT
Start: 2022-01-03 | End: 2022-02-08

## 2022-01-03 NOTE — PROGRESS NOTES
Subjective:       Patient ID: Jyoti Blue is a 61 y.o. female.    Chief Complaint: Follow-up and hlpd    HPI  60 y/o female with HLPD, OA of R knee, CLBP, Vit D Def, Prediabetes, PVC's is here for follow up prediabetes.     She is doing well, s/p R eye cataract surgery 11/2021, plans for L eye 1/24/22. She denies f/n/v/d/constipation/cp/lightheadedness/dizziness/sob/urinary sx.  She still gets occasional palpitations, occurring less overall since starting diltiazem, maybe occurs 1-2 times a week, follow up with Dr. Hood due. She is trying to eat healthy, she has not been very active but plans to start. She is sleeping well.  Mood good.      Hx of Covid-19 1/2021  PCV's/Palpitations: following with Cardiology, Diltiazem 120 mg daily  ENT Dr. Terrell, gets cerumen impaction and has to get her ears cleaned every 6 months  R knee pain on and off for 10 years: pain is achy and comes and goes, she denies knee swelling, she has been to ortho and had multiple injections, she repots taking Naproxen 1-2 days a month helps and diclofenac helps some  GYN: followed by Dr. Quita Bess, pelvic utd, mmg DIS 12/2021 need to get report  Prediabetes: a1c 5.9 10/2021, metformin  mg daily  Vitamin D def: Vitamin D3 2,000 IU daily gummies  HLPD, Mild carotid disease: repeat Carotid US in 2024; asa 81 mg daily, Pravastatin 40 mg daily  OTC: asa 81 mg daily  Colonoscopy 2011 repeat due now  Eye exam followed by Dr. Kirby  Dental utd  Covid vaccines complete    Review of Systems  see HPI  Objective:      /70 (BP Location: Right arm, Patient Position: Sitting, BP Method: Large (Manual))   Pulse 75   Temp 98.7 °F (37.1 °C) (Oral)   Ht 5' (1.524 m)   Wt 84.5 kg (186 lb 4.6 oz)   SpO2 98%   BMI 36.38 kg/m²   Physical Exam  Vitals and nursing note reviewed.   Constitutional:       Appearance: She is well-developed and well-nourished.   HENT:      Head: Normocephalic and atraumatic.   Neck:      Thyroid: No  thyromegaly.   Cardiovascular:      Rate and Rhythm: Normal rate and regular rhythm.      Heart sounds: Normal heart sounds.   Pulmonary:      Effort: Pulmonary effort is normal. No respiratory distress.      Breath sounds: Normal breath sounds.   Abdominal:      General: Abdomen is flat. Bowel sounds are normal. There is no distension.      Palpations: Abdomen is soft. There is no mass.      Tenderness: There is no abdominal tenderness.   Musculoskeletal:         General: No edema.      Cervical back: Normal range of motion and neck supple.      Right lower leg: No edema.      Left lower leg: No edema.   Lymphadenopathy:      Cervical: No cervical adenopathy.   Skin:     General: Skin is warm and dry.   Neurological:      Mental Status: She is alert.   Psychiatric:         Mood and Affect: Mood and affect normal.         Assessment:       1. Prediabetes    2. Annual physical exam    3. Hyperlipidemia, unspecified hyperlipidemia type    4. Bilateral carotid artery stenosis    5. Screen for colon cancer    6. History of COVID-19    7. Palpitations    8. BMI 36.0-36.9,adult        Plan:   Jyoti was seen today for follow-up and hlpd.    Diagnoses and all orders for this visit:    Prediabetes  -     Hemoglobin A1C; Future  -     Vitamin B12; Future  -     metFORMIN (GLUCOPHAGE-XR) 500 MG ER 24hr tablet; Take 1 tablet (500 mg total) by mouth daily with breakfast.    Annual physical exam  -     Comprehensive Metabolic Panel; Future  -     CBC Auto Differential; Future  -     Hemoglobin A1C; Future  -     Lipid Panel; Future  -     TSH; Future  -     Urinalysis; Future  -     Vitamin B12; Future    Hyperlipidemia, unspecified hyperlipidemia type  -     Lipid Panel; Future  -     pravastatin (PRAVACHOL) 40 MG tablet; Take 1 tablet (40 mg total) by mouth once daily.    Bilateral carotid artery stenosis  -     pravastatin (PRAVACHOL) 40 MG tablet; Take 1 tablet (40 mg total) by mouth once daily.    Screen for colon cancer  -      Case Request Endoscopy: COLONOSCOPY    History of COVID-19    Palpitations    BMI 36.0-36.9,adult

## 2022-01-06 LAB — SARS-COV-2 RNA RESP QL NAA+PROBE: NOT DETECTED

## 2022-01-07 ENCOUNTER — PATIENT OUTREACH (OUTPATIENT)
Dept: ADMINISTRATIVE | Facility: HOSPITAL | Age: 62
End: 2022-01-07
Payer: COMMERCIAL

## 2022-01-07 ENCOUNTER — TELEPHONE (OUTPATIENT)
Dept: PRIMARY CARE CLINIC | Facility: CLINIC | Age: 62
End: 2022-01-07
Payer: COMMERCIAL

## 2022-01-26 RX ORDER — DILTIAZEM HYDROCHLORIDE 120 MG/1
120 CAPSULE, EXTENDED RELEASE ORAL DAILY
Qty: 90 CAPSULE | Refills: 3 | Status: SHIPPED | OUTPATIENT
Start: 2022-01-26 | End: 2022-02-08

## 2022-02-01 ENCOUNTER — TELEPHONE (OUTPATIENT)
Dept: OPHTHALMOLOGY | Facility: CLINIC | Age: 62
End: 2022-02-01
Payer: COMMERCIAL

## 2022-02-01 NOTE — TELEPHONE ENCOUNTER
----- Message from Ju Odom sent at 2/1/2022 10:36 AM CST -----  Regarding: Speak with office  Contact: Jyoti Petersen is calling to speak with office to see if any fees are due prior to surgery.    [phone]

## 2022-02-01 NOTE — TELEPHONE ENCOUNTER
Spoke to pt and advised that she will have to pay what her insurance requires her to as far as copayment or deductible.  Pt advised to call her insurance company and also advised that I would send a message to hannah Schreiber to see if she can be of assistance regarding whether or not there are additional payments required.  Pt understood.

## 2022-02-04 ENCOUNTER — ANESTHESIA EVENT (OUTPATIENT)
Dept: SURGERY | Facility: HOSPITAL | Age: 62
End: 2022-02-04
Payer: COMMERCIAL

## 2022-02-04 ENCOUNTER — LAB VISIT (OUTPATIENT)
Dept: FAMILY MEDICINE | Facility: CLINIC | Age: 62
End: 2022-02-04
Payer: COMMERCIAL

## 2022-02-04 DIAGNOSIS — Z13.9 SCREENING PROCEDURE: ICD-10-CM

## 2022-02-04 PROCEDURE — U0003 INFECTIOUS AGENT DETECTION BY NUCLEIC ACID (DNA OR RNA); SEVERE ACUTE RESPIRATORY SYNDROME CORONAVIRUS 2 (SARS-COV-2) (CORONAVIRUS DISEASE [COVID-19]), AMPLIFIED PROBE TECHNIQUE, MAKING USE OF HIGH THROUGHPUT TECHNOLOGIES AS DESCRIBED BY CMS-2020-01-R: HCPCS | Performed by: OPHTHALMOLOGY

## 2022-02-04 PROCEDURE — U0005 INFEC AGEN DETEC AMPLI PROBE: HCPCS | Performed by: OPHTHALMOLOGY

## 2022-02-04 NOTE — H&P
History    Chief complaint:  Painless progressive vision loss    Present Ilness/Diagnosis: Nuclear sclerotic Cataract    Past Medical History:  has a past medical history of Allergy, Anemia, Diabetes mellitus, Hyperlipidemia, and Hypertension.    Family History/Social History: refer to chart    Allergies: Review of patient's allergies indicates:  No Known Allergies    Current Medications: No current facility-administered medications for this encounter.    Current Outpatient Medications:     ascorbic acid, vitamin C, (VITAMIN C) 100 MG tablet, Take 100 mg by mouth once daily. Pt is unsure of dose, Disp: , Rfl:     aspirin (ECOTRIN) 81 MG EC tablet, Take 1 tablet (81 mg total) by mouth once daily., Disp: 90 tablet, Rfl: 1    cholecalciferol, vitamin D3, 25 mcg (1,000 unit) Chew, Take 5 Int'l Units by mouth once daily. Pt is taking 2000 IU daily, Disp: , Rfl:     cyanocobalamin (VITAMIN B-12) 100 MCG tablet, Take 100 mcg by mouth once daily. Pt is unsure of dose, Disp: , Rfl:     diltiaZEM (DILACOR XR) 120 MG CDCR, Take 1 capsule (120 mg total) by mouth once daily., Disp: 90 capsule, Rfl: 3    metFORMIN (GLUCOPHAGE-XR) 500 MG ER 24hr tablet, Take 1 tablet (500 mg total) by mouth daily with breakfast., Disp: 90 tablet, Rfl: 3    naproxen (NAPROSYN) 500 MG tablet, Take 1 tablet (500 mg total) by mouth daily as needed. (Patient taking differently: Take 500 mg by mouth as needed.), Disp: 30 tablet, Rfl: 0    pravastatin (PRAVACHOL) 40 MG tablet, Take 1 tablet (40 mg total) by mouth once daily., Disp: 90 tablet, Rfl: 1    albuterol (VENTOLIN HFA) 90 mcg/actuation inhaler, Inhale 2 puffs into the lungs every 6 (six) hours as needed for Wheezing. Rescue (Patient taking differently: Inhale 2 puffs into the lungs as needed for Wheezing. Rescue), Disp: 18 g, Rfl: 0    azelastine (ASTELIN) 137 mcg (0.1 %) nasal spray, 1 spray (137 mcg total) by Nasal route 2 (two) times daily. (Patient taking differently: 1 spray by  Nasal route as needed.), Disp: 30 mL, Rfl: 1    cyclobenzaprine (FLEXERIL) 10 MG tablet, Take 1 tablet (10 mg total) by mouth 2 (two) times daily as needed. (Patient taking differently: Take 10 mg by mouth as needed.), Disp: 30 tablet, Rfl: 0    diclofenac sodium (VOLTAREN) 1 % Gel, Apply 2 g topically 4 (four) times daily. (Patient taking differently: Apply 2 g topically as needed.), Disp: 100 g, Rfl: 0    prednisolon/gatiflox/bromfenac (PREDNISOL ACE-GATIFLOX-BROMFEN) 1-0.5-0.075 % DrpS, Apply 1 drop to eye 3 (three) times daily., Disp: 5 mL, Rfl: 3    Facility-Administered Medications Ordered in Other Encounters:     lactated ringers infusion, , Intravenous, Continuous, Margarita Hanna MD, Last Rate: 10 mL/hr at 11/22/21 0946, Restarted at 11/22/21 0959    LIDOcaine (PF) 10 mg/ml (1%) injection 10 mg, 1 mL, Intradermal, Once, Margarita Hanna MD    Physical Exam    BP: Vital signs stable  General: No apparent distress  HEENT: nuclear sclerotic cataract  Lungs: adequate respirations  Heart: + pulses  Abdomen: soft  Rectal/pelvic: deferred    Impression: Visually significant Cataract.    See previous clinic notes for surgical indications.    Plan: Phacoemulsification with implantation of Intraocular lens

## 2022-02-05 LAB
SARS-COV-2 RNA RESP QL NAA+PROBE: NOT DETECTED
SARS-COV-2- CYCLE NUMBER: NORMAL

## 2022-02-07 ENCOUNTER — HOSPITAL ENCOUNTER (OUTPATIENT)
Facility: HOSPITAL | Age: 62
Discharge: HOME | End: 2022-02-07
Attending: OPHTHALMOLOGY | Admitting: OPHTHALMOLOGY
Payer: COMMERCIAL

## 2022-02-07 ENCOUNTER — ANESTHESIA (OUTPATIENT)
Dept: SURGERY | Facility: HOSPITAL | Age: 62
End: 2022-02-07
Payer: COMMERCIAL

## 2022-02-07 VITALS
RESPIRATION RATE: 16 BRPM | BODY MASS INDEX: 35.53 KG/M2 | OXYGEN SATURATION: 98 % | TEMPERATURE: 98 F | HEART RATE: 77 BPM | WEIGHT: 181 LBS | SYSTOLIC BLOOD PRESSURE: 145 MMHG | DIASTOLIC BLOOD PRESSURE: 71 MMHG | HEIGHT: 60 IN

## 2022-02-07 DIAGNOSIS — H25.10 SENILE NUCLEAR SCLEROSIS: ICD-10-CM

## 2022-02-07 DIAGNOSIS — H25.12 NUCLEAR SCLEROTIC CATARACT OF LEFT EYE: Primary | ICD-10-CM

## 2022-02-07 PROCEDURE — 37000009 HC ANESTHESIA EA ADD 15 MINS: Mod: PO | Performed by: OPHTHALMOLOGY

## 2022-02-07 PROCEDURE — 63600175 PHARM REV CODE 636 W HCPCS: Mod: PO | Performed by: OPHTHALMOLOGY

## 2022-02-07 PROCEDURE — 66982 XCAPSL CTRC RMVL CPLX WO ECP: CPT | Mod: 79,LT,, | Performed by: OPHTHALMOLOGY

## 2022-02-07 PROCEDURE — V2632 POST CHMBR INTRAOCULAR LENS: HCPCS | Mod: PO | Performed by: OPHTHALMOLOGY

## 2022-02-07 PROCEDURE — 36000707: Mod: PO | Performed by: OPHTHALMOLOGY

## 2022-02-07 PROCEDURE — 63600175 PHARM REV CODE 636 W HCPCS: Mod: PO | Performed by: NURSE ANESTHETIST, CERTIFIED REGISTERED

## 2022-02-07 PROCEDURE — D9220A PRA ANESTHESIA: Mod: CRNA,,, | Performed by: NURSE ANESTHETIST, CERTIFIED REGISTERED

## 2022-02-07 PROCEDURE — 66982 PR REMOVAL, CATARACT, W/INSRT INTRAOC LENS, W/O ENDO CYCLO, CMPLX: ICD-10-PCS | Mod: 79,LT,, | Performed by: OPHTHALMOLOGY

## 2022-02-07 PROCEDURE — D9220A PRA ANESTHESIA: ICD-10-PCS | Mod: CRNA,,, | Performed by: NURSE ANESTHETIST, CERTIFIED REGISTERED

## 2022-02-07 PROCEDURE — 71000015 HC POSTOP RECOV 1ST HR: Mod: PO | Performed by: OPHTHALMOLOGY

## 2022-02-07 PROCEDURE — D9220A PRA ANESTHESIA: Mod: ANES,,, | Performed by: ANESTHESIOLOGY

## 2022-02-07 PROCEDURE — 25000003 PHARM REV CODE 250: Mod: PO

## 2022-02-07 PROCEDURE — 63600175 PHARM REV CODE 636 W HCPCS: Mod: PO | Performed by: ANESTHESIOLOGY

## 2022-02-07 PROCEDURE — 36000706: Mod: PO | Performed by: OPHTHALMOLOGY

## 2022-02-07 PROCEDURE — D9220A PRA ANESTHESIA: ICD-10-PCS | Mod: ANES,,, | Performed by: ANESTHESIOLOGY

## 2022-02-07 PROCEDURE — 37000008 HC ANESTHESIA 1ST 15 MINUTES: Mod: PO | Performed by: OPHTHALMOLOGY

## 2022-02-07 PROCEDURE — 25000003 PHARM REV CODE 250: Mod: PO | Performed by: OPHTHALMOLOGY

## 2022-02-07 DEVICE — LENS EYHANCE +21.0D: Type: IMPLANTABLE DEVICE | Site: EYE | Status: FUNCTIONAL

## 2022-02-07 RX ORDER — SODIUM CHLORIDE, SODIUM LACTATE, POTASSIUM CHLORIDE, CALCIUM CHLORIDE 600; 310; 30; 20 MG/100ML; MG/100ML; MG/100ML; MG/100ML
INJECTION, SOLUTION INTRAVENOUS CONTINUOUS
Status: DISPENSED | OUTPATIENT
Start: 2022-02-07

## 2022-02-07 RX ORDER — MIDAZOLAM HYDROCHLORIDE 1 MG/ML
INJECTION INTRAMUSCULAR; INTRAVENOUS
Status: DISCONTINUED | OUTPATIENT
Start: 2022-02-07 | End: 2022-02-07

## 2022-02-07 RX ORDER — FENTANYL CITRATE 50 UG/ML
25 INJECTION, SOLUTION INTRAMUSCULAR; INTRAVENOUS EVERY 5 MIN PRN
Status: ACTIVE | OUTPATIENT
Start: 2022-02-07

## 2022-02-07 RX ORDER — LIDOCAINE HYDROCHLORIDE 10 MG/ML
1 INJECTION, SOLUTION EPIDURAL; INFILTRATION; INTRACAUDAL; PERINEURAL ONCE
Status: ACTIVE | OUTPATIENT
Start: 2022-02-07

## 2022-02-07 RX ORDER — PREDNISOLONE ACETATE 10 MG/ML
SUSPENSION/ DROPS OPHTHALMIC
Status: DISCONTINUED | OUTPATIENT
Start: 2022-02-07 | End: 2022-02-07 | Stop reason: HOSPADM

## 2022-02-07 RX ORDER — LIDOCAINE HYDROCHLORIDE 40 MG/ML
2 INJECTION, SOLUTION RETROBULBAR
Status: ACTIVE | OUTPATIENT
Start: 2022-02-07 | End: 2022-02-07

## 2022-02-07 RX ORDER — LIDOCAINE HYDROCHLORIDE 10 MG/ML
INJECTION, SOLUTION EPIDURAL; INFILTRATION; INTRACAUDAL; PERINEURAL
Status: DISCONTINUED | OUTPATIENT
Start: 2022-02-07 | End: 2022-02-07 | Stop reason: HOSPADM

## 2022-02-07 RX ORDER — OXYCODONE HYDROCHLORIDE 5 MG/1
5 TABLET ORAL
Status: ACTIVE | OUTPATIENT
Start: 2022-02-07

## 2022-02-07 RX ORDER — PROPARACAINE HYDROCHLORIDE 5 MG/ML
1 SOLUTION/ DROPS OPHTHALMIC ONCE
Status: COMPLETED | OUTPATIENT
Start: 2022-02-07 | End: 2022-02-07

## 2022-02-07 RX ORDER — PHENYLEPHRINE HYDROCHLORIDE 25 MG/ML
1 SOLUTION/ DROPS OPHTHALMIC
Status: COMPLETED | OUTPATIENT
Start: 2022-02-07 | End: 2022-02-07

## 2022-02-07 RX ORDER — LIDOCAINE HYDROCHLORIDE 40 MG/ML
INJECTION, SOLUTION RETROBULBAR
Status: DISCONTINUED | OUTPATIENT
Start: 2022-02-07 | End: 2022-02-07 | Stop reason: HOSPADM

## 2022-02-07 RX ORDER — MOXIFLOXACIN 5 MG/ML
SOLUTION/ DROPS OPHTHALMIC
Status: DISCONTINUED | OUTPATIENT
Start: 2022-02-07 | End: 2022-02-07 | Stop reason: HOSPADM

## 2022-02-07 RX ORDER — ACETAMINOPHEN 325 MG/1
650 TABLET ORAL EVERY 4 HOURS PRN
Status: DISCONTINUED | OUTPATIENT
Start: 2022-02-07 | End: 2022-02-07 | Stop reason: HOSPADM

## 2022-02-07 RX ORDER — HYDROMORPHONE HYDROCHLORIDE 2 MG/ML
0.2 INJECTION, SOLUTION INTRAMUSCULAR; INTRAVENOUS; SUBCUTANEOUS EVERY 5 MIN PRN
Status: ACTIVE | OUTPATIENT
Start: 2022-02-07

## 2022-02-07 RX ORDER — TROPICAMIDE 10 MG/ML
1 SOLUTION/ DROPS OPHTHALMIC
Status: COMPLETED | OUTPATIENT
Start: 2022-02-07 | End: 2022-02-07

## 2022-02-07 RX ORDER — KETOROLAC TROMETHAMINE 5 MG/ML
1 SOLUTION OPHTHALMIC ONCE
Status: COMPLETED | OUTPATIENT
Start: 2022-02-07 | End: 2022-02-07

## 2022-02-07 RX ORDER — OFLOXACIN 3 MG/ML
1 SOLUTION/ DROPS OPHTHALMIC
Status: COMPLETED | OUTPATIENT
Start: 2022-02-07 | End: 2022-02-07

## 2022-02-07 RX ORDER — ONDANSETRON 2 MG/ML
INJECTION INTRAMUSCULAR; INTRAVENOUS
Status: DISCONTINUED | OUTPATIENT
Start: 2022-02-07 | End: 2022-02-07

## 2022-02-07 RX ADMIN — TROPICAMIDE 1 DROP: 10 SOLUTION/ DROPS OPHTHALMIC at 08:02

## 2022-02-07 RX ADMIN — MIDAZOLAM HYDROCHLORIDE 2 MG: 1 INJECTION, SOLUTION INTRAMUSCULAR; INTRAVENOUS at 08:02

## 2022-02-07 RX ADMIN — PHENYLEPHRINE HYDROCHLORIDE 1 DROP: 25 SOLUTION/ DROPS OPHTHALMIC at 08:02

## 2022-02-07 RX ADMIN — OFLOXACIN 1 DROP: 3 SOLUTION/ DROPS OPHTHALMIC at 08:02

## 2022-02-07 RX ADMIN — MIDAZOLAM HYDROCHLORIDE 1 MG: 1 INJECTION, SOLUTION INTRAMUSCULAR; INTRAVENOUS at 09:02

## 2022-02-07 RX ADMIN — PROPARACAINE HYDROCHLORIDE 1 DROP: 5 SOLUTION/ DROPS OPHTHALMIC at 08:02

## 2022-02-07 RX ADMIN — SODIUM CHLORIDE, SODIUM LACTATE, POTASSIUM CHLORIDE, AND CALCIUM CHLORIDE: .6; .31; .03; .02 INJECTION, SOLUTION INTRAVENOUS at 08:02

## 2022-02-07 RX ADMIN — ONDANSETRON 4 MG: 2 INJECTION INTRAMUSCULAR; INTRAVENOUS at 08:02

## 2022-02-07 RX ADMIN — KETOROLAC TROMETHAMINE 1 DROP: 5 SOLUTION OPHTHALMIC at 08:02

## 2022-02-07 NOTE — BRIEF OP NOTE
BRIEF DISCHARGE NOTE:    Date of discharge: 02/07/2022    Reason for hospitalization -  Cataract surgery     Final Diagnosis - Visually significant Cataract    Procedures and treatment provided - Status post phacoemulsification with placement of intraocular lens     Diet - Advance to regular as tolerated    Activity - as tolerated    Disposition at the end of the case - Good.    Discharge: to home    The patient tolerated the procedure well and knows to follow up with me tomorrow morning in the eye clinic, sooner if needed.    Patient and family instructions (as appropriate) - Given to patient on discharge    Bonnie Choe MD

## 2022-02-07 NOTE — TRANSFER OF CARE
Anesthesia Transfer of Care Note    Patient: Jyoti Blue    Procedure(s) Performed: Procedure(s) (LRB):  EXTRACTION, CATARACT, WITH IOL INSERTION (Left)    Patient location: PACU    Anesthesia Type: MAC    Transport from OR: Transported from OR on room air with adequate spontaneous ventilation    Post pain: adequate analgesia    Post assessment: no apparent anesthetic complications    Post vital signs: stable    Level of consciousness: awake    Nausea/Vomiting: no nausea/vomiting    Complications: none    Transfer of care protocol was followed      Last vitals:   Visit Vitals  BP (!) 144/66 (BP Location: Right arm, Patient Position: Lying)   Pulse 60   Temp 36.8 °C (98.2 °F) (Skin)   Resp 18   Ht 5' (1.524 m)   Wt 82.1 kg (181 lb)   SpO2 98%   Breastfeeding No   BMI 35.35 kg/m²

## 2022-02-07 NOTE — OP NOTE
DATE OF PROCEDURE: 02/07/2022    SURGEON: ZION BEJARANO MD    PREOPERATIVE DIAGNOSIS:  Mature brunescent senile nuclear sclerotic cataract left eye.     POSTOPERATIVE DIAGNOSIS: Mature brunescent senile nuclear sclerotic cataract left eye.     PROCEDURE PERFORMED:  Complex phacoemulsification with placement of intraocular lens, left eye, with trypan blue    IMPLANT:  DIB00  21.0    ANESTHESIA:  Topical and MAC    COMPLICATIONS: none    ESTIMATED BLOOD LOSS: <1cc    SPECIMENS: none    INDICATIONS FOR PROCEDURE:  This patient presented to the clinic with decreased vision in the left eye and was found to have a cataract.  The risks, benefits, and alternatives were discussed and the patient agreed to proceed with phacoemulsification and implantation of a lens in the left eye.     PROCEDURE IN DETAIL:  The patient was met in the preop holding area.  Consent was confirmed to be signed.  The operative site was marked.  The patient was brought into the operating room by the anesthesia team and placed under monitored anesthesia care.  The left eye was prepped and draped in a sterile ophthalmic fashion.  A Víctor speculum was placed into the left eye.   A paracentesis site was made and 1% preservative-free lidocaine was injected into the anterior chamber.  Trypan blue was then injected and allowed to sit for 1 minute.  Then BSS was used to wash out the trypan blue. Viscoelastic material was injected into the anterior chamber.  A keratome blade was used to make a clear corneal incision.  A cystotome was used to initiate the continuous curvilinear capsulorrhexis which was completed with Utrata forceps.  BSS on a holman cannula was used to perform hydrodissection.  The phacoemulsification tip was introduced into the eye and the nucleus was removed in a standard divide-and-conquer fashion.  Remaining cortical material was removed from the eye using irrigation-aspiration.  The capsular bag was filled with viscoelastic material  and the intraocular lens was injected and positioned into place. Remaining viscoelastic material was removed from the eye using irrigation and aspiration.  The corneal wounds were hydrated.  The eye was filled to physiologic pressure. The wounds were found to be watertight. Drops of Vigamox and prednisilone were placed into the eye.  The eye was washed, dried, and shielded.  The patient tolerated the procedure well and knows to follow up with me tomorrow morning, sooner if needed.

## 2022-02-07 NOTE — ANESTHESIA PREPROCEDURE EVALUATION
02/07/2022  Jyoti Blue is a 61 y.o., female.    Pre-op Assessment    I have reviewed the Patient Summary Reports.     I have reviewed the Nursing Notes.       Review of Systems  Anesthesia Hx:  No problems with previous Anesthesia    Musculoskeletal:   Arthritis     Neurological:   Neuromuscular Disease,        Physical Exam  General:  Well nourished, Obesity    Airway/Jaw/Neck:  Airway Findings: Mouth Opening: Normal Tongue: Normal  TM Distance: Normal, at least 6 cm  Jaw/Neck Findings:  Neck ROM: Normal ROM     Eyes/Ears/Nose:  Eyes/Ears/Nose Findings:    Dental:  Dental Findings:   Chest/Lungs:  Chest/Lungs Findings: Normal Respiratory Rate     Heart/Vascular:  Heart Findings: Rate: Normal  Rhythm: Regular Rhythm        Mental Status:  Mental Status Findings:  Cooperative, Alert and Oriented         Anesthesia Plan  Type of Anesthesia, risks & benefits discussed:  Anesthesia Type:  MAC    Patient's Preference: MAC  Plan Factors:          Intra-op Monitoring Plan: standard ASA monitors  Intra-op Monitoring Plan Comments:   Post Op Pain Control Plan: multimodal analgesia and per primary service following discharge from PACU  Post Op Pain Control Plan Comments:     Induction:    Beta Blocker:         Informed Consent: Patient understands risks and agrees with Anesthesia plan.  Questions answered. Anesthesia consent signed with patient.  ASA Score: 2     Day of Surgery Review of History & Physical:    H&P update referred to the surgeon.         Ready For Surgery From Anesthesia Perspective.

## 2022-02-07 NOTE — ANESTHESIA POSTPROCEDURE EVALUATION
Anesthesia Post Evaluation    Patient: Jyoti Blue    Procedure(s) Performed: Procedure(s) (LRB):  EXTRACTION, CATARACT, WITH IOL INSERTION (Left)    Final Anesthesia Type: MAC      Patient location during evaluation: PACU  Patient participation: Yes- Able to Participate  Level of consciousness: awake and alert, oriented and awake  Post-procedure vital signs: reviewed and stable  Pain management: adequate  Airway patency: patent    PONV status at discharge: No PONV  Anesthetic complications: no      Cardiovascular status: blood pressure returned to baseline and hemodynamically stable  Respiratory status: unassisted, spontaneous ventilation and room air  Hydration status: euvolemic  Follow-up not needed.          Vitals Value Taken Time   /70 02/07/22 0930   Temp 36.5 °C (97.7 °F) 02/07/22 0925   Pulse 79 02/07/22 0930   Resp 14 02/07/22 0930   SpO2 97 % 02/07/22 0930         No case tracking events are documented in the log.      Pain/Ke Score: Ke Score: 10 (2/7/2022  9:25 AM)

## 2022-02-08 ENCOUNTER — OFFICE VISIT (OUTPATIENT)
Dept: OPHTHALMOLOGY | Facility: CLINIC | Age: 62
End: 2022-02-08
Payer: COMMERCIAL

## 2022-02-08 ENCOUNTER — OFFICE VISIT (OUTPATIENT)
Dept: CARDIOLOGY | Facility: CLINIC | Age: 62
End: 2022-02-08
Payer: COMMERCIAL

## 2022-02-08 VITALS
WEIGHT: 187.38 LBS | OXYGEN SATURATION: 98 % | SYSTOLIC BLOOD PRESSURE: 155 MMHG | DIASTOLIC BLOOD PRESSURE: 68 MMHG | BODY MASS INDEX: 36.79 KG/M2 | HEART RATE: 63 BPM | HEIGHT: 60 IN

## 2022-02-08 DIAGNOSIS — I49.3 PVC (PREMATURE VENTRICULAR CONTRACTION): Primary | ICD-10-CM

## 2022-02-08 DIAGNOSIS — I10 PRIMARY HYPERTENSION: Chronic | ICD-10-CM

## 2022-02-08 DIAGNOSIS — I65.23 BILATERAL CAROTID ARTERY STENOSIS: ICD-10-CM

## 2022-02-08 DIAGNOSIS — Z96.1 STATUS POST CATARACT EXTRACTION AND INSERTION OF INTRAOCULAR LENS, RIGHT: ICD-10-CM

## 2022-02-08 DIAGNOSIS — Z96.1 STATUS POST CATARACT EXTRACTION AND INSERTION OF INTRAOCULAR LENS, LEFT: Primary | ICD-10-CM

## 2022-02-08 DIAGNOSIS — E66.01 MORBID OBESITY: Chronic | ICD-10-CM

## 2022-02-08 DIAGNOSIS — Z98.42 STATUS POST CATARACT EXTRACTION AND INSERTION OF INTRAOCULAR LENS, LEFT: Primary | ICD-10-CM

## 2022-02-08 DIAGNOSIS — E78.5 HYPERLIPIDEMIA, UNSPECIFIED HYPERLIPIDEMIA TYPE: ICD-10-CM

## 2022-02-08 DIAGNOSIS — Z98.41 STATUS POST CATARACT EXTRACTION AND INSERTION OF INTRAOCULAR LENS, RIGHT: ICD-10-CM

## 2022-02-08 PROCEDURE — 1159F PR MEDICATION LIST DOCUMENTED IN MEDICAL RECORD: ICD-10-PCS | Mod: CPTII,S$GLB,, | Performed by: OPHTHALMOLOGY

## 2022-02-08 PROCEDURE — 99999 PR PBB SHADOW E&M-EST. PATIENT-LVL III: ICD-10-PCS | Mod: PBBFAC,,, | Performed by: OPHTHALMOLOGY

## 2022-02-08 PROCEDURE — 99999 PR PBB SHADOW E&M-EST. PATIENT-LVL IV: CPT | Mod: PBBFAC,,, | Performed by: INTERNAL MEDICINE

## 2022-02-08 PROCEDURE — 99214 PR OFFICE/OUTPT VISIT, EST, LEVL IV, 30-39 MIN: ICD-10-PCS | Mod: S$GLB,,, | Performed by: INTERNAL MEDICINE

## 2022-02-08 PROCEDURE — 99999 PR PBB SHADOW E&M-EST. PATIENT-LVL III: CPT | Mod: PBBFAC,,, | Performed by: OPHTHALMOLOGY

## 2022-02-08 PROCEDURE — 99999 PR PBB SHADOW E&M-EST. PATIENT-LVL IV: ICD-10-PCS | Mod: PBBFAC,,, | Performed by: INTERNAL MEDICINE

## 2022-02-08 PROCEDURE — 99214 OFFICE O/P EST MOD 30 MIN: CPT | Mod: S$GLB,,, | Performed by: INTERNAL MEDICINE

## 2022-02-08 PROCEDURE — 99024 PR POST-OP FOLLOW-UP VISIT: ICD-10-PCS | Mod: S$GLB,,, | Performed by: OPHTHALMOLOGY

## 2022-02-08 PROCEDURE — 1159F MED LIST DOCD IN RCRD: CPT | Mod: CPTII,S$GLB,, | Performed by: OPHTHALMOLOGY

## 2022-02-08 PROCEDURE — 99024 POSTOP FOLLOW-UP VISIT: CPT | Mod: S$GLB,,, | Performed by: OPHTHALMOLOGY

## 2022-02-08 RX ORDER — ATORVASTATIN CALCIUM 80 MG/1
80 TABLET, FILM COATED ORAL DAILY
Qty: 90 TABLET | Refills: 3 | Status: SHIPPED | OUTPATIENT
Start: 2022-02-08 | End: 2023-01-09 | Stop reason: SDUPTHER

## 2022-02-08 RX ORDER — DILTIAZEM HYDROCHLORIDE 180 MG/1
180 CAPSULE, EXTENDED RELEASE ORAL DAILY
Qty: 30 CAPSULE | Refills: 11 | Status: SHIPPED | OUTPATIENT
Start: 2022-02-08 | End: 2023-02-16

## 2022-02-08 NOTE — PROGRESS NOTES
Patient MRN: [id number]  Patient : 1960  PCP: Francie Yip MD  CC:   Chief Complaint   Patient presents with    Annual Exam    Follow-up        History of Present Illness:   Jyoti Blue is a 61 y.o. y.o. female who presents for consultation for palpitations. Followed by Dr. Hood.      This is a new patient for me presenting with an established problem of palpitations. Her other chronic medical conditions include HLD, HTN, DMII.    Patient was evaluated for palpitations with a 48 hour Holter and noted to have a 3.5% PVC burden.  She is coronary risk factors of hypertension, hyperlipidemia, diabetes type 2 and family history.  She underwent a dobutamine stress echo that was negative for ischemia.  Post stress test she was initiated on diltiazem 120. She notes an improvement in her palpitations but continues to be symptomatic from PVCs.  She is compliant to medications.  She is limiting alcohol and caffeine intake.  Her heart rate currently in the 60s.  Blood pressure remains somewhat elevated in the 150s.  She is encouraged diet exercise.  Not currently having any chest pain or shortness of breath.    This is the extent of the patient's complaints at this time. Patient queried and denies any further complaint.     Past Medical History:     Past Medical History:   Diagnosis Date    Allergy     Anemia     Diabetes mellitus     Hyperlipidemia     Hypertension        Past Surgical History:     Past Surgical History:   Procedure Laterality Date    BREAST SURGERY      L sided lumpectomy, was told it was benign    CATARACT EXTRACTION      CATARACT EXTRACTION W/  INTRAOCULAR LENS IMPLANT Right 2021    Procedure: EXTRACTION, CATARACT, WITH IOL INSERTION;  Surgeon: Bonnie Choe MD;  Location: [facility] OR;  Service: Ophthalmology;  Laterality: Right;  Right    CATARACT EXTRACTION W/  INTRAOCULAR LENS IMPLANT Left 2022    Procedure: EXTRACTION, CATARACT, WITH IOL INSERTION;  Surgeon:  "Bonnie Choe MD;  Location: [facility] OR;  Service: Ophthalmology;  Laterality: Left;  LEFT    DILATION AND CURETTAGE OF UTERUS      EYE SURGERY      R eye cataract    ovary removed         Social History:     Social History     Tobacco Use    Smoking status: Former Smoker     Packs/day: 0.50     Years: 8.00     Pack years: 4.00     Types: Cigarettes     Quit date: 1987     Years since quittin.8    Smokeless tobacco: Never Used    Tobacco comment: quit smoking in    Substance Use Topics    Alcohol use: Yes     Alcohol/week: 0.0 standard drinks     Comment: "every now and then"       Family History:     Family History   Problem Relation Age of Onset    Stroke Mother     Heart disease Mother     Heart disease Father     Acne Neg Hx     Eczema Neg Hx     Psoriasis Neg Hx        Allergies:   Review of patient's allergies indicates:  No Known Allergies    Review of Systems:   Review of Systems   Constitutional: Negative for chills, fever and weight loss.   HENT: Negative for hearing loss and sore throat.    Eyes: Negative for blurred vision.   Respiratory: Negative for cough and shortness of breath.    Cardiovascular: Positive for palpitations. Negative for chest pain, orthopnea, leg swelling and PND.   Gastrointestinal: Negative for abdominal pain, constipation, diarrhea, heartburn, nausea and vomiting.   Genitourinary: Negative for dysuria.   Musculoskeletal: Negative for neck pain.   Skin: Negative for rash.   Neurological: Negative for dizziness and headaches.   Endo/Heme/Allergies: Does not bruise/bleed easily.   Psychiatric/Behavioral: Negative for substance abuse.       All other systems reviewed and are negative.   Medications:     Current Outpatient Medications on File Prior to Visit   Medication Sig Dispense Refill    ascorbic acid, vitamin C, (VITAMIN C) 100 MG tablet Take 100 mg by mouth once daily. Pt is unsure of dose      aspirin (ECOTRIN) 81 MG EC tablet Take 1 tablet (81 mg " total) by mouth once daily. 90 tablet 1    azelastine (ASTELIN) 137 mcg (0.1 %) nasal spray 1 spray (137 mcg total) by Nasal route 2 (two) times daily. 30 mL 1    cholecalciferol, vitamin D3, 25 mcg (1,000 unit) Chew Take 5 Int'l Units by mouth once daily. Pt is taking 2000 IU daily      cyanocobalamin (VITAMIN B-12) 100 MCG tablet Take 100 mcg by mouth once daily. Pt is unsure of dose      cyclobenzaprine (FLEXERIL) 10 MG tablet Take 1 tablet (10 mg total) by mouth 2 (two) times daily as needed. (Patient taking differently: Take 10 mg by mouth as needed.) 30 tablet 0    diclofenac sodium (VOLTAREN) 1 % Gel Apply 2 g topically 4 (four) times daily. (Patient taking differently: Apply 2 g topically as needed.) 100 g 0    metFORMIN (GLUCOPHAGE-XR) 500 MG ER 24hr tablet Take 1 tablet (500 mg total) by mouth daily with breakfast. 90 tablet 3    naproxen (NAPROSYN) 500 MG tablet Take 1 tablet (500 mg total) by mouth daily as needed. (Patient taking differently: Take 500 mg by mouth as needed.) 30 tablet 0    prednisolon/gatiflox/bromfenac (PREDNISOL ACE-GATIFLOX-BROMFEN) 1-0.5-0.075 % DrpS Apply 1 drop to eye 3 (three) times daily. 5 mL 3    [DISCONTINUED] diltiaZEM (DILACOR XR) 120 MG CDCR Take 1 capsule (120 mg total) by mouth once daily. 90 capsule 3    [DISCONTINUED] pravastatin (PRAVACHOL) 40 MG tablet Take 1 tablet (40 mg total) by mouth once daily. 90 tablet 1    albuterol (VENTOLIN HFA) 90 mcg/actuation inhaler Inhale 2 puffs into the lungs every 6 (six) hours as needed for Wheezing. Rescue (Patient not taking: Reported on 2/8/2022) 18 g 0     Current Facility-Administered Medications on File Prior to Visit   Medication Dose Route Frequency Provider Last Rate Last Admin    electrolyte-S (ISOLYTE)   Intravenous Continuous Moncho Solano MD        fentaNYL 50 mcg/mL injection 25 mcg  25 mcg Intravenous Q5 Min PRN Moncho Solano MD        HYDROmorphone (PF) injection 0.2 mg  0.2 mg Intravenous Q5 Min  PRN Moncho Solano MD        lactated ringers infusion   Intravenous Continuous Margarita Hanna MD 10 mL/hr at 11/22/21 0946 Restarted at 11/22/21 0959    lactated ringers infusion   Intravenous Continuous Moncho Solano MD 10 mL/hr at 02/07/22 0836 Restarted at 02/07/22 0852    LIDOcaine (PF) 10 mg/ml (1%) injection 10 mg  1 mL Intradermal Once Margarita Hanna MD        LIDOcaine (PF) 10 mg/ml (1%) injection 10 mg  1 mL Intradermal Once Moncho Solano MD        oxyCODONE immediate release tablet 5 mg  5 mg Oral Q3H PRN Moncho Solano MD           Physical Exam:   BP (!) 155/68 (BP Location: Left arm, Patient Position: Sitting, BP Method: Medium (Automatic))   Pulse 63   Ht 5' (1.524 m)   Wt 85 kg (187 lb 6.3 oz)   SpO2 98%   BMI 36.60 kg/m²   Physical Exam  Vitals reviewed.   HENT:      Head: Normocephalic and atraumatic.   Eyes:      Conjunctiva/sclera: Conjunctivae normal.   Cardiovascular:      Rate and Rhythm: Normal rate and regular rhythm.      Heart sounds: Normal heart sounds. No murmur heard.  No friction rub. No gallop.    Pulmonary:      Effort: Pulmonary effort is normal. No respiratory distress.      Breath sounds: Normal breath sounds. No wheezing or rales.   Chest:      Chest wall: No tenderness.   Abdominal:      General: Bowel sounds are normal. There is no distension.      Palpations: Abdomen is soft. There is no mass.      Tenderness: There is no abdominal tenderness. There is no guarding or rebound.   Musculoskeletal:         General: No edema. Normal range of motion.      Cervical back: Normal range of motion and neck supple.   Skin:     General: Skin is warm and dry.   Neurological:      Mental Status: She is alert and oriented to person, place, and time.   Psychiatric:         Mood and Affect: Mood normal.          Labs:     Lab Results   Component Value Date    WBC 3.20 (L) 10/16/2021    HGB 12.4 10/16/2021    HCT 38.6 10/16/2021     10/16/2021    GRAN 1.5 (L)  10/16/2021    GRAN 46.3 10/16/2021        Chemistry        Component Value Date/Time     10/16/2021 1123    K 3.9 10/16/2021 1123     10/16/2021 1123    CO2 25 10/16/2021 1123    BUN 15 10/16/2021 1123    CREATININE 0.7 10/16/2021 1123    GLU 95 10/16/2021 1123        Component Value Date/Time    CALCIUM 9.6 10/16/2021 1123    ALKPHOS 84 10/16/2021 1123    AST 17 10/16/2021 1123    ALT 15 10/16/2021 1123    BILITOT 0.4 10/16/2021 1123    ESTGFRAFRICA >60.0 10/16/2021 1123    EGFRNONAA >60.0 10/16/2021 1123          Lab Results   Component Value Date    ALT 15 10/16/2021    AST 17 10/16/2021    ALKPHOS 84 10/16/2021    BILITOT 0.4 10/16/2021      Lab Results   Component Value Date    HGBA1C 5.9 (H) 10/16/2021     Lab Results   Component Value Date    BNP 11 2021     Lab Results   Component Value Date    CHOL 207 (H) 2021    HDL 67 2021    LDLCALC 130.4 2021    TRIG 48 2021     No results found for: INR, PROTIME     I have reviewed all pertinent labs within the past 24 hours.    Cardiovascular Imaging:   Echo: No results found for: EF  2D echo with color flow doppler: No results found for this or any previous visit.      Test(s) Reviewed   I have reviewed the following in detail:  [] Stress test   [] Angiography   [x] Echocardiogram   [x] Labs   [] Other:       Assessment & Plan:   PVC (premature ventricular contraction)  3.5% PVC burden on holter monitor  - Increase Diltiazem to 180mg daily  -  ECHO negative  - Consider switching to beta blocker is continues to have symptmos    Hypertension  -uncontrolled  - increase Diltiazem  - Consider initiation of ARB/ACE-I or Chlorthalidone on follow up    Hyperlipidemia, unspecified hyperlipidemia type  - uncontrolled  - DC pravastatin  - Start Lipitor 80mg    Bilateral carotid artery stenosis  - DC pravastatin  - Start Lipitor 80mg  - continue asa    Morbid obesity  - encouraged diet and exercise         The 10-year ASCVD risk  score (Woo DAVE Jr., et al., 2013) is: 8.1%    Values used to calculate the score:      Age: 61 years      Sex: Female      Is Non- : Yes      Diabetic: No      Tobacco smoker: No      Systolic Blood Pressure: 155 mmHg      Is BP treated: No      HDL Cholesterol: 67 mg/dL      Total Cholesterol: 207 mg/dL     -RTC in 1 month    Patient was discussed with  who agrees with the assessment and plan as above.    Matthew Pritchett - Pager# [phone]  2/8/2022  4:52 PM  Cardiovascular Fellow  [facility]

## 2022-02-08 NOTE — PROGRESS NOTES
I have personally taken the history and examined this patient and agree with the Fellow's note as stated above.    Agree with plan to increase diltiazem - hr should not be a limiting step at the proposed new dose.

## 2022-02-08 NOTE — PROGRESS NOTES
HPI     Agustín referral    S/p phaco w/IOL OD 11/22/21  S/P Phaco with IOL OS 2/7/2022 DIB00  21.0    Artificial tears PRN   Combo TID OS    Patient states she can see very well. No pain.     Last edited by Gisele Andrew on 2/8/2022  2:22 PM. (History)            Assessment /Plan     For exam results, see Encounter Report.    Status post cataract extraction and insertion of intraocular lens, left    Status post cataract extraction and insertion of intraocular lens, right      Slit Lamp Exam  L/L - normal  C/s - quiet  Cornea - clear  A/C - 1+ cell  Lens - PCIOL    POD #1 s/p phaco/IOL  - doing well  - continue the following drops:    vigamox or ocuflox TID x 1 wk then stop  Pred forte or durezol or dexamethasone TID x  4 wks  Ketorolac TID until runs out    Versus:    Combination drop - 1 drop TID x total of 1 month    Appropriate precautions and post op medications reviewed.  Patient instructed to call or come in if symptoms of redness, decreased vision, or pain are experienced.    -f/up 3 mo - establish care with optom.

## 2022-02-21 ENCOUNTER — PATIENT OUTREACH (OUTPATIENT)
Dept: ADMINISTRATIVE | Facility: HOSPITAL | Age: 62
End: 2022-02-21
Payer: COMMERCIAL

## 2022-02-21 ENCOUNTER — PATIENT MESSAGE (OUTPATIENT)
Dept: ADMINISTRATIVE | Facility: HOSPITAL | Age: 62
End: 2022-02-21
Payer: COMMERCIAL

## 2022-03-08 ENCOUNTER — OFFICE VISIT (OUTPATIENT)
Dept: OPHTHALMOLOGY | Facility: CLINIC | Age: 62
End: 2022-03-08
Payer: COMMERCIAL

## 2022-03-08 DIAGNOSIS — Z98.41 STATUS POST CATARACT EXTRACTION AND INSERTION OF INTRAOCULAR LENS, RIGHT: ICD-10-CM

## 2022-03-08 DIAGNOSIS — Z96.1 STATUS POST CATARACT EXTRACTION AND INSERTION OF INTRAOCULAR LENS, RIGHT: ICD-10-CM

## 2022-03-08 DIAGNOSIS — Z96.1 STATUS POST CATARACT EXTRACTION AND INSERTION OF INTRAOCULAR LENS, LEFT: Primary | ICD-10-CM

## 2022-03-08 DIAGNOSIS — Z98.42 STATUS POST CATARACT EXTRACTION AND INSERTION OF INTRAOCULAR LENS, LEFT: Primary | ICD-10-CM

## 2022-03-08 PROCEDURE — 99999 PR PBB SHADOW E&M-EST. PATIENT-LVL III: ICD-10-PCS | Mod: PBBFAC,,, | Performed by: OPHTHALMOLOGY

## 2022-03-08 PROCEDURE — 99999 PR PBB SHADOW E&M-EST. PATIENT-LVL III: CPT | Mod: PBBFAC,,, | Performed by: OPHTHALMOLOGY

## 2022-03-08 PROCEDURE — 99024 PR POST-OP FOLLOW-UP VISIT: ICD-10-PCS | Mod: S$GLB,,, | Performed by: OPHTHALMOLOGY

## 2022-03-08 PROCEDURE — 99024 POSTOP FOLLOW-UP VISIT: CPT | Mod: S$GLB,,, | Performed by: OPHTHALMOLOGY

## 2022-03-08 PROCEDURE — 1159F MED LIST DOCD IN RCRD: CPT | Mod: CPTII,S$GLB,, | Performed by: OPHTHALMOLOGY

## 2022-03-08 PROCEDURE — 1159F PR MEDICATION LIST DOCUMENTED IN MEDICAL RECORD: ICD-10-PCS | Mod: CPTII,S$GLB,, | Performed by: OPHTHALMOLOGY

## 2022-03-08 RX ORDER — PREDNISOLONE ACETATE 10 MG/ML
1 SUSPENSION/ DROPS OPHTHALMIC 3 TIMES DAILY
Qty: 5 ML | Refills: 1 | Status: SHIPPED | OUTPATIENT
Start: 2022-03-08 | End: 2022-04-07

## 2022-03-08 NOTE — PROGRESS NOTES
HPI     Agustín referral     S/p phaco w/IOL OD 11/22/21  S/P Phaco with IOL OS 2/7/2022 DIB00  21.0     Artificial tears PRN   Combo TID OS    Pt states having problems with glare OS. Even with sunglasses on she has   trouble driving.     Last edited by Cheryle Quintana on 3/8/2022  1:55 PM. (History)            Assessment /Plan     For exam results, see Encounter Report.    Status post cataract extraction and insertion of intraocular lens, left    Status post cataract extraction and insertion of intraocular lens, right    Other orders  -     prednisoLONE acetate (PRED FORTE) 1 % DrpS; Place 1 drop into both eyes 3 (three) times daily.  Dispense: 5 mL; Refill: 1         S/p phaco w/IOL OD 11/22/21  S/P Phaco with IOL OS 2/7/2022 DIB00  21.0    With rebound iritis OS>OD    Start PF OU 3/2/1 will call next wk to ensure eyes feeling better    F/up gAustín as scheduled    PRED FORTE - SHAKE WELL - 3 TIMES A DAY FOR 1 WK THEN DECREASE TO   TWICE A DAY FOR 1 WK THEN DAILY FOR ONE WEEK THEN STOP

## 2022-03-10 ENCOUNTER — TELEPHONE (OUTPATIENT)
Dept: OPHTHALMOLOGY | Facility: CLINIC | Age: 62
End: 2022-03-10
Payer: COMMERCIAL

## 2022-03-10 RX ORDER — CYCLOPENTOLATE HYDROCHLORIDE 5 MG/ML
1 SOLUTION/ DROPS OPHTHALMIC 2 TIMES DAILY
Qty: 15 ML | Refills: 0 | Status: SHIPPED | OUTPATIENT
Start: 2022-03-10 | End: 2023-01-09 | Stop reason: ALTCHOICE

## 2022-03-10 NOTE — TELEPHONE ENCOUNTER
----- Message from Ju Odom sent at 3/10/2022  1:35 PM CST -----  Regarding: Speak with office  Contact: Jyoti  Pt is calling to speak with office about her eyes feeling like they are dilated.      [phone]

## 2022-03-11 ENCOUNTER — TELEPHONE (OUTPATIENT)
Dept: CARDIAC CATH/INVASIVE PROCEDURES | Facility: HOSPITAL | Age: 62
End: 2022-03-11
Payer: COMMERCIAL

## 2022-03-11 DIAGNOSIS — I10 PRIMARY HYPERTENSION: Primary | ICD-10-CM

## 2022-03-11 RX ORDER — CHLORTHALIDONE 25 MG/1
25 TABLET ORAL DAILY
Qty: 30 TABLET | Refills: 11 | Status: SHIPPED | OUTPATIENT
Start: 2022-03-11 | End: 2023-03-13 | Stop reason: SDUPTHER

## 2022-03-11 NOTE — TELEPHONE ENCOUNTER
Received notification of patient having elevated BP readings. HR is controlled on Diltiazem. BP was elevated at last clinic visit.     Start Chlorthalidone 25mg daily. Called, no answer, left message. BMP in 1 week.     She has follow up scheduled with Dr. Hood.

## 2022-03-14 ENCOUNTER — TELEPHONE (OUTPATIENT)
Dept: OPHTHALMOLOGY | Facility: CLINIC | Age: 62
End: 2022-03-14
Payer: COMMERCIAL

## 2022-03-14 NOTE — TELEPHONE ENCOUNTER
Spoke with pt and she has not started new drops yet. She will  today. Says light sensitivity is imporving but still bothersome

## 2022-03-16 ENCOUNTER — TELEPHONE (OUTPATIENT)
Dept: OPHTHALMOLOGY | Facility: CLINIC | Age: 62
End: 2022-03-16
Payer: COMMERCIAL

## 2022-03-16 NOTE — TELEPHONE ENCOUNTER
----- Message from Bonnie Choe MD sent at 3/8/2022  2:31 PM CST -----  Start PF OU 3/2/1 will call next wk to ensure eyes feeling better

## 2022-03-16 NOTE — TELEPHONE ENCOUNTER
Spoke to pt after starting PF OU 3/2/1.  Pt states she is using twice a day at this time and will continue the taper.  Pt states her eyes seem to be getting better.  Pt will call if she has any problems or concerns.

## 2022-04-05 ENCOUNTER — PATIENT OUTREACH (OUTPATIENT)
Dept: ADMINISTRATIVE | Facility: OTHER | Age: 62
End: 2022-04-05
Payer: COMMERCIAL

## 2022-04-05 NOTE — PROGRESS NOTES
Requested updates within Care Everywhere.  Patient's chart was reviewed for overdue KENIA topics.  Open case rquest for colonoscopy placed 1/3/22  Health maintenance:updated  Immunizations:reconciled   Legacy:   Media:  Orders placed:  Tasked appts:  Labs Linked:  Upcoming appt:

## 2022-04-06 PROBLEM — R00.2 PALPITATIONS: Status: RESOLVED | Noted: 2021-03-04 | Resolved: 2022-04-06

## 2022-04-12 ENCOUNTER — TELEPHONE (OUTPATIENT)
Dept: GENETICS | Facility: CLINIC | Age: 62
End: 2022-04-12

## 2022-04-12 NOTE — TELEPHONE ENCOUNTER
----- Message from Dulce Segundo sent at 6/9/2021  1:24 PM CDT -----  Contact: self [phone]  Pt is scheduled for today at 3 PM. She would like to RS for a later date. Can you please call and discuss? Thanks

## 2022-04-24 NOTE — PROGRESS NOTES
The patient location is: [place]  The chief complaint leading to consultation is: f/u PVCs    Visit type: audiovisual    Face to Face time with patient: 30 minutes of total time spent on the encounter, which includes face to face time and non-face to face time preparing to see the patient (eg, review of tests), Obtaining and/or reviewing separately obtained history, Documenting clinical information in the electronic or other health record, Independently interpreting results (not separately reported) and communicating results to the patient/family/caregiver, or Care coordination (not separately reported).         Each patient to whom he or she provides medical services by telemedicine is:  (1) informed of the relationship between the physician and patient and the respective role of any other health care provider with respect to management of the patient; and (2) notified that he or she may decline to receive medical services by telemedicine and may withdraw from such care at any time.    Notes:          Cardiology Clinic Note  Reason for Visit: PVCs    HPI:     Jyoti Blue is a 61 y.o. F, who presents for f/u PVCs.    She was seen by Gamal Pritchett/Kathleen. Dilt increased, statin changed.    Dilt increased to 180mg with improvement.   Palps lessened since this change. Now rare.  No issues with LE edema. No issues with constipation.  No soft drinks. Usually has decaff. Has wine on occasion.  No snoring, apneic episodes. Feels rested. Easy nods off.    Swapped pravastatin to atorvastatin.  She is due for labs with PCP in upcoming months.  Has improved diet. Cutting out rice, pastas, breads.    BP has been controlled on home checks.  This morning, 108/85.     Medical: HLD, carotid artery disease (R ECA >50% stenosis)  Surgical: Reviewed, as below.  Family: Reviewed, as below. Mother with heart disease. Father with heart disease.  Social: Reviewed, as below. Former smoker 1/2 PPD x4y, quit 1987.    ROS:     Constitution: Negative for fever or chills.  HENT: Negative for  headaches.  Eyes: Negative for blurred vision.   Cardiovascular: See above  Pulmonary: Negative for SOB. Negative for cough.   Gastrointestinal: Negative for nausea/vomiting.   : Negative for dysuria.   Skin: Negative for rashes.  Neurological: Negative for focal weakness.  Psychological: Negative for depression.  PMH:     Past Medical History:   Diagnosis Date    Allergy     Anemia     Diabetes mellitus     Hyperlipidemia      Past Surgical History:   Procedure Laterality Date    BREAST SURGERY      L sided lumpectomy, was told it was benign    CATARACT EXTRACTION      CATARACT EXTRACTION W/  INTRAOCULAR LENS IMPLANT Right 11/22/2021    Procedure: EXTRACTION, CATARACT, WITH IOL INSERTION;  Surgeon: Bonnie Choe MD;  Location: [facility] OR;  Service: Ophthalmology;  Laterality: Right;  Right    CATARACT EXTRACTION W/  INTRAOCULAR LENS IMPLANT Left 2/7/2022    Procedure: EXTRACTION, CATARACT, WITH IOL INSERTION;  Surgeon: Bonnie Choe MD;  Location: [facility] OR;  Service: Ophthalmology;  Laterality: Left;  LEFT    DILATION AND CURETTAGE OF UTERUS      EYE SURGERY      R eye cataract    ovary removed       Allergies:   Review of patient's allergies indicates:  No Known Allergies  Medications:     Current Outpatient Medications on File Prior to Visit   Medication Sig Dispense Refill    albuterol (VENTOLIN HFA) 90 mcg/actuation inhaler Inhale 2 puffs into the lungs every 6 (six) hours as needed for Wheezing. Rescue (Patient not taking: Reported on 2/8/2022) 18 g 0    ascorbic acid, vitamin C, (VITAMIN C) 100 MG tablet Take 100 mg by mouth once daily. Pt is unsure of dose      aspirin (ECOTRIN) 81 MG EC tablet Take 1 tablet (81 mg total) by mouth once daily. 90 tablet 1    atorvastatin (LIPITOR) 80 MG tablet Take 1 tablet (80 mg total) by mouth once daily. 90 tablet 3    azelastine (ASTELIN) 137 mcg (0.1 %) nasal spray 1 spray (137 mcg  total) by Nasal route 2 (two) times daily. 30 mL 1    chlorthalidone (HYGROTEN) 25 MG Tab Take 1 tablet (25 mg total) by mouth once daily. 30 tablet 11    cholecalciferol, vitamin D3, 25 mcg (1,000 unit) Chew Take 5 Int'l Units by mouth once daily. Pt is taking 2000 IU daily      cyanocobalamin (VITAMIN B-12) 100 MCG tablet Take 100 mcg by mouth once daily. Pt is unsure of dose      cyclobenzaprine (FLEXERIL) 10 MG tablet Take 1 tablet (10 mg total) by mouth 2 (two) times daily as needed. (Patient taking differently: Take 10 mg by mouth as needed.) 30 tablet 0    cyclopentolate (CYCLOGYL) 0.5 % ophthalmic solution Place 1 drop into both eyes 2 (two) times a day. 15 mL 0    diclofenac sodium (VOLTAREN) 1 % Gel Apply 2 g topically 4 (four) times daily. (Patient taking differently: Apply 2 g topically as needed.) 100 g 0    diltiaZEM (DILACOR XR) 180 MG CDCR Take 1 capsule (180 mg total) by mouth once daily. 30 capsule 11    metFORMIN (GLUCOPHAGE-XR) 500 MG ER 24hr tablet Take 1 tablet (500 mg total) by mouth daily with breakfast. 90 tablet 3    naproxen (NAPROSYN) 500 MG tablet Take 1 tablet (500 mg total) by mouth daily as needed. (Patient taking differently: Take 500 mg by mouth as needed.) 30 tablet 0    prednisolon/gatiflox/bromfenac (PREDNISOL ACE-GATIFLOX-BROMFEN) 1-0.5-0.075 % DrpS Apply 1 drop to eye 3 (three) times daily. 5 mL 3     Current Facility-Administered Medications on File Prior to Visit   Medication Dose Route Frequency Provider Last Rate Last Admin    electrolyte-S (ISOLYTE)   Intravenous Continuous Moncho Solano MD        fentaNYL 50 mcg/mL injection 25 mcg  25 mcg Intravenous Q5 Min PRN Moncho Solano MD        HYDROmorphone (PF) injection 0.2 mg  0.2 mg Intravenous Q5 Min PRN Moncho Solano MD        lactated ringers infusion   Intravenous Continuous Margarita Hanna MD 10 mL/hr at 11/22/21 0946 Restarted at 11/22/21 0959    lactated ringers infusion   Intravenous  "Continuous Moncho Solano MD 10 mL/hr at 22 0836 Restarted at 22 0852    LIDOcaine (PF) 10 mg/ml (1%) injection 10 mg  1 mL Intradermal Once Margarita Hanna MD        LIDOcaine (PF) 10 mg/ml (1%) injection 10 mg  1 mL Intradermal Once Moncho Solano MD        oxyCODONE immediate release tablet 5 mg  5 mg Oral Q3H PRN Moncho Solano MD         Social History:     Social History     Tobacco Use    Smoking status: Former Smoker     Packs/day: 0.50     Years: 8.00     Pack years: 4.00     Types: Cigarettes     Quit date: 1987     Years since quittin.0    Smokeless tobacco: Never Used    Tobacco comment: quit smoking in    Substance Use Topics    Alcohol use: Yes     Alcohol/week: 0.0 standard drinks     Comment: "every now and then"     Family History:     Family History   Problem Relation Age of Onset    Stroke Mother     Heart disease Mother     Heart disease Father     Acne Neg Hx     Eczema Neg Hx     Psoriasis Neg Hx      Physical Exam:   There were no vitals taken for this visit.     This morning, 108/85. Weight 182.7    Labs:     Blood Tests:  Lab Results   Component Value Date    BNP 11 2021     10/16/2021    K 3.9 10/16/2021     10/16/2021    CO2 25 10/16/2021    BUN 15 10/16/2021    CREATININE 0.7 10/16/2021    GLU 95 10/16/2021    HGBA1C 5.9 (H) 10/16/2021    AST 17 10/16/2021    ALT 15 10/16/2021    ALBUMIN 3.8 10/16/2021    PROT 7.4 10/16/2021    BILITOT 0.4 10/16/2021    WBC 3.20 (L) 10/16/2021    HGB 12.4 10/16/2021    HCT 38.6 10/16/2021    MCV 89 10/16/2021     10/16/2021    TSH 1.914 2021       Lab Results   Component Value Date    CHOL 207 (H) 2021    HDL 67 2021    TRIG 48 2021       Lab Results   Component Value Date    LDLCALC 130.4 2021       Urine Tests:  Lab Results   Component Value Date    COLORU Yellow 10/13/2020    APPEARANCEUA Clear 10/13/2020    PHUR 5.0 10/13/2020    SPECGRAV 1.020 " "10/13/2020    PROTEINUA Negative 10/13/2020    GLUCUA Negative 10/13/2020    KETONESU Negative 10/13/2020    BILIRUBINUA Negative 10/13/2020    OCCULTUA Negative 10/13/2020    NITRITE Negative 10/13/2020    UROBILINOGEN Negative 07/21/2018    LEUKOCYTESUR Negative 10/13/2020       Imaging:     Echocardiogram  None    Stress testing  DWIGHT 3/9/21  · The test was stopped because the patient experienced fatigue.  · During stress, the following significant arrhythmias were observed: frequent PVCs.  · The patient's exercise capacity was mildly impaired.  · The ECG portion of this study is positive for myocardial ischemia.  · The left ventricle is normal in size with normal systolic function. The estimated ejection fraction is 63%  · Normal left ventricular diastolic function.  · Normal right ventricular size with normal right ventricular systolic function.  · The stress echo portion of this study is negative for myocardial ischemia.  · LVEF increased to upper 60s and there were no RWMAs despite the abnormal stress ECG ( officially "False Positive") and the HTN BP response to exercise, which reduces the specificity of the echo .    Treadmill 8/9/16  EKG Conclusions:     1. The EKG portion of this study is negative for ischemia at a moderate workload, and peak heart rate of 141 bpm (89% of predicted).   2. Blood pressure response to exercise was normal (Presenting BP: 128/63 Peak BP: 183/82).   3. The following arrhythmias were present: frequent PVCs.   4. There were no symptoms of chest discomfort or significant dyspnea throughout the protocol.   5. The Pires treadmill score was 5 suggesting a low probability for future cardiovascular events.     Cath Lab  None    Other  Holter 2/12/21  · Patient was monitored for 47 hours and 59 min3  · The average HR was 87 bpm with a min HR of 52 bpm and max HR of 138 bpm  · Ventricular ectopic activity consisted of 2426 beats, of which, 1666 were single PVCs, 726 single VEs, 31 bigeminy, " 3 trigeminy  · Rhythm included 29 min 15 sec of bradycardia. The slowest episode occurred at 8:51am D1, lasting 15 sec with min HR of 52 bpm  · Rhythm included 8 hr 32 min 27 sec of tachycardia. The fastest episode occurred at 2:27pm D2, lasting 7 min 34 sec with max HR of 138 bpm  · Supraventricular ectopic activity consisted of 7 beats, of which, 6 were late beats, 1 single PAC.  · The longest R-R and N-N interval was 1.6 sec occurring at 3:55am D1  · PVCS APPEAR TO BE MONOMORPHIC IN LATE DIASTOLE    Carotid US 19  <50% bilateral ICA stenosis.  >50% R ECA stenosis.  Antegrade vertebral flow bilaterally.    EK21  Normal sinus rhythm  Minimal voltage criteria for LVH, may be normal variant    Assessment:     1. Primary hypertension    2. Hyperlipidemia, unspecified hyperlipidemia type    3. Bilateral carotid artery stenosis    4. PVC (premature ventricular contraction)    5. Prediabetes      Plan:     PVC (premature ventricular contraction)  Symptoms due to PVCs. Holter monitor shows 3.5% PVC burden.  Stress echo negative for ischemia    Continue diltiazem 180mg qd.  Discussed triggers of alcohol, caffeine. Instructed to lessen, yocasta during episodes of frequent symptoms.    Bilateral carotid artery stenosis  Asymptomatic. ECA >50% stenosis  Continue ASA, high intensity statin    Hyperlipidemia  Statin, as above  Repeat lipids   Discussed Mediterranean diet    Prediabetes  Followed by PCP    Signed:  Car Hood MD  Cardiology     2022 11:05 AM    Follow-up:     Future Appointments   Date Time Provider Department Center   2022 10:30 AM Woodrow Hood III, MD [facility] CARDIO [facility] ronda   2022  9:30 AM [facility]   2022  9:40 AM [facility]IMEN [facility]   2022  8:40 AM [facility] [place]

## 2022-04-27 ENCOUNTER — OFFICE VISIT (OUTPATIENT)
Dept: CARDIOLOGY | Facility: CLINIC | Age: 62
End: 2022-04-27
Payer: COMMERCIAL

## 2022-04-27 VITALS — SYSTOLIC BLOOD PRESSURE: 108 MMHG | HEART RATE: 65 BPM | DIASTOLIC BLOOD PRESSURE: 85 MMHG

## 2022-04-27 DIAGNOSIS — I49.3 PVC (PREMATURE VENTRICULAR CONTRACTION): ICD-10-CM

## 2022-04-27 DIAGNOSIS — E78.5 HYPERLIPIDEMIA, UNSPECIFIED HYPERLIPIDEMIA TYPE: ICD-10-CM

## 2022-04-27 DIAGNOSIS — I65.23 BILATERAL CAROTID ARTERY STENOSIS: ICD-10-CM

## 2022-04-27 DIAGNOSIS — I10 PRIMARY HYPERTENSION: Primary | Chronic | ICD-10-CM

## 2022-04-27 DIAGNOSIS — R73.03 PREDIABETES: ICD-10-CM

## 2022-04-27 PROCEDURE — 99214 OFFICE O/P EST MOD 30 MIN: CPT | Mod: 95,,, | Performed by: INTERNAL MEDICINE

## 2022-04-27 PROCEDURE — 3079F DIAST BP 80-89 MM HG: CPT | Mod: CPTII,95,, | Performed by: INTERNAL MEDICINE

## 2022-04-27 PROCEDURE — 1159F MED LIST DOCD IN RCRD: CPT | Mod: CPTII,95,, | Performed by: INTERNAL MEDICINE

## 2022-04-27 PROCEDURE — 3074F SYST BP LT 130 MM HG: CPT | Mod: CPTII,95,, | Performed by: INTERNAL MEDICINE

## 2022-04-27 PROCEDURE — 3074F PR MOST RECENT SYSTOLIC BLOOD PRESSURE < 130 MM HG: ICD-10-PCS | Mod: CPTII,95,, | Performed by: INTERNAL MEDICINE

## 2022-04-27 PROCEDURE — 1159F PR MEDICATION LIST DOCUMENTED IN MEDICAL RECORD: ICD-10-PCS | Mod: CPTII,95,, | Performed by: INTERNAL MEDICINE

## 2022-04-27 PROCEDURE — 99214 PR OFFICE/OUTPT VISIT, EST, LEVL IV, 30-39 MIN: ICD-10-PCS | Mod: 95,,, | Performed by: INTERNAL MEDICINE

## 2022-04-27 PROCEDURE — 3079F PR MOST RECENT DIASTOLIC BLOOD PRESSURE 80-89 MM HG: ICD-10-PCS | Mod: CPTII,95,, | Performed by: INTERNAL MEDICINE

## 2022-05-11 ENCOUNTER — TELEPHONE (OUTPATIENT)
Dept: INTERNAL MEDICINE | Facility: CLINIC | Age: 62
End: 2022-05-11
Payer: COMMERCIAL

## 2022-05-11 NOTE — TELEPHONE ENCOUNTER
Called patient to r/s appt states she would like to do next available, time given and will check her calendar and call Health  back.   Nikole Cuellar RN HC

## 2022-05-29 NOTE — PROGRESS NOTES
Patient arrives for Covid-19/SARS testing.    Swab collected using [facility] and CDC guidelines.    Patient given information on receiving results and left with mask in place.     Patient is a 89y old  Female who presents with a chief complaint of Hip fracture (29 May 2022 09:51)      FROM ADMISSION H+P:   HPI:  89 y/o female w/ PMHx of HTN, HF (unknown EF), hypertension, depression and anxiety presents after having a fall earlier today. Patient chocked on something, was coughing,  went to bathroom and suddenly fell. She hit head in the frontal side, L elbow and hip. Had an episode of urinary incontinence after fall. Patient's son helped to get out from the floor, denies LOC. Patient denies presyncopal symptoms. Patient has multiple falls in the last couple of weeks due to unstable gait, however patient refuse to use walker per son, last fall was 3 days ago. Patent endorses poor appetite has 2 meals per day. Has constipation, not able to remember when was the last BM. She has chronic SIDHU.   At this time she denies headaches, dizziness, nausea, vomiting, chest pain, abdominal pain, fever, hematuria, melena, hematochezia or other symptoms.   Patient has been living with her son for the last 3 months.       In ED:   Vitals: 97.6F, HR 66, 134/43, RR 15, 98% on RA   Labs significant for: WBC 10.97, hgb 10.1, Cr 1.9 (baseline unknown), BUN 44, BNP 2743    CT head and c spine: scalp hematoma w/o intracranial hemorrhage, no c spine fracture  CT hip: Comminuted, displaced fracture of the greater trochanter of the femur. There appears to be extension of the fracture into the intertrochanteric region of the left femur.  EKG: NSR @ 68 bpm   Received Ofirmev 1000mg x1, ofirmev 500mg x1 and Ortho consulted in the ED     (24 May 2022 22:21)      ----  INTERVAL HPI/OVERNIGHT EVENTS: Pt seen and evaluated at the bedside. No acute overnight events occurred. Patient states she is doing well. Denies any pain at this time.     ----  PAST MEDICAL & SURGICAL HISTORY:  Hypothyroid      HTN (hypertension)      HF (heart failure)      Anxiety and depression      S/P hysterectomy      FH: cholecystectomy          FAMILY HISTORY:  FH: heart disease (Father)        Home Medications:  amLODIPine 5 mg oral tablet: 1 tab(s) orally once a day (24 May 2022 23:25)  citalopram 40 mg oral tablet: 1 tab(s) orally once a day (24 May 2022 23:25)  Lasix 20 mg oral tablet: 1 tab(s) orally once a day (24 May 2022 23:25)  levothyroxine 50 mcg (0.05 mg) oral tablet: 1 tab(s) orally once a day (24 May 2022 23:25)  metoprolol succinate 50 mg oral tablet, extended release: 1 tab(s) orally once a day (24 May 2022 23:25)  omeprazole 20 mg oral delayed release capsule: 1 cap(s) orally once a day (24 May 2022 23:25)  potassium chloride 8 mEq (600 mg) oral capsule, extended release: 1 cap(s) orally once a day (24 May 2022 23:25)      Allergies    sulfa drugs (Nausea)    Intolerances    codeine (Nausea)      ----  MEDICATIONS  (STANDING):  amLODIPine   Tablet 5 milliGRAM(s) Oral daily  citalopram 40 milliGRAM(s) Oral daily  heparin   Injectable 5000 Unit(s) SubCutaneous every 8 hours  levothyroxine 75 MICROGram(s) Oral daily  melatonin 3 milliGRAM(s) Oral at bedtime  metoprolol succinate ER 50 milliGRAM(s) Oral daily  pantoprazole    Tablet 40 milliGRAM(s) Oral before breakfast  polyethylene glycol 3350 17 Gram(s) Oral at bedtime  senna 2 Tablet(s) Oral at bedtime    MEDICATIONS  (PRN):  benzocaine 15 mG/menthol 3.6 mG Lozenge 1 Lozenge Oral four times a day PRN Sore Throat  ondansetron    Tablet 4 milliGRAM(s) Oral three times a day PRN Nausea and/or Vomiting  traMADol 100 milliGRAM(s) Oral every 6 hours PRN Moderate Pain (4 - 6)  traMADol 50 milliGRAM(s) Oral every 6 hours PRN Mild Pain (1 - 3)      ----  REVIEW OF SYSTEMS:  Constitutional: denies fever, chills  HEENT: denies headache, dizziness, or lightheadedness  Respiratory: denies SOB, cough, or wheezing  Cardiovascular: denies CP, palpitations  Gastrointestinal: denies nausea, vomiting, diarrhea, constipation, abdominal pain, or bloody stools  Genitourinary: denies painful urination, increased frequency, urgency, or bloody urine  Skin/Breast: denies rashes or itching  Musculoskeletal: denies muscle aches, joint swelling, or muscle weakness  Neurologic: denies loss of sensation, numbness, or tingling    ----  PHYSICAL EXAM:  Gen: well appearing, NAD  HEENT: NCAT, PEERLA b/l, EOMI b/l, no conjunctival erythema  Cardio: regular rate and rhythm, +s1s2, no murmurs, rubs, or gallops  Pulm: CTA b/l, no wheezes, rales or rhonchi  Abdomen: soft, nontender, nondistended, +BS x4 quadrants, no guarding  Extremities: no clubbing, cyanosis or edema, +2 pedal pulses  Neuro: AAOx3, CNII-XII intact grossly, 5/5 strength all extremities, sensation intact  Skin: warm and dry      T(C): 36.9 (05-29-22 @ 05:21), Max: 36.9 (05-28-22 @ 13:05)  HR: 64 (05-29-22 @ 05:21) (64 - 70)  BP: 128/47 (05-29-22 @ 05:21) (107/55 - 131/52)  RR: 18 (05-29-22 @ 05:21) (18 - 18)  SpO2: 95% (05-29-22 @ 05:21) (93% - 95%)  Wt(kg): --    ----  I&O's Summary    28 May 2022 07:01  -  29 May 2022 07:00  --------------------------------------------------------  IN: 1500 mL / OUT: 0 mL / NET: 1500 mL        LABS:                        8.3    7.59  )-----------( 176      ( 29 May 2022 08:57 )             25.8     05-29    136  |  102  |  58<H>  ----------------------------<  124<H>  4.0   |  24  |  2.40<H>    Ca    7.4<L>      29 May 2022 08:57  Phos  4.5     05-28  Mg     2.3     05-28    TPro  6.0  /  Alb  2.5<L>  /  TBili  0.3  /  DBili  x   /  AST  26  /  ALT  7<L>  /  AlkPhos  70  05-28        CAPILLARY BLOOD GLUCOSE                    ----           Patient is a 89y old  Female who presents with a chief complaint of Hip fracture (29 May 2022 09:51)      FROM ADMISSION H+P:   HPI:  89 y/o female w/ PMHx of HTN, HF (unknown EF), hypertension, depression and anxiety presents after having a fall earlier today. Patient chocked on something, was coughing,  went to bathroom and suddenly fell. She hit head in the frontal side, L elbow and hip. Had an episode of urinary incontinence after fall. Patient's son helped to get out from the floor, denies LOC. Patient denies presyncopal symptoms. Patient has multiple falls in the last couple of weeks due to unstable gait, however patient refuse to use walker per son, last fall was 3 days ago. Patent endorses poor appetite has 2 meals per day. Has constipation, not able to remember when was the last BM. She has chronic SIDHU.   At this time she denies headaches, dizziness, nausea, vomiting, chest pain, abdominal pain, fever, hematuria, melena, hematochezia or other symptoms.   Patient has been living with her son for the last 3 months.       In ED:   Vitals: 97.6F, HR 66, 134/43, RR 15, 98% on RA   Labs significant for: WBC 10.97, hgb 10.1, Cr 1.9 (baseline unknown), BUN 44, BNP 2743    CT head and c spine: scalp hematoma w/o intracranial hemorrhage, no c spine fracture  CT hip: Comminuted, displaced fracture of the greater trochanter of the femur. There appears to be extension of the fracture into the intertrochanteric region of the left femur.  EKG: NSR @ 68 bpm   Received Ofirmev 1000mg x1, ofirmev 500mg x1 and Ortho consulted in the ED     (24 May 2022 22:21)      ----  INTERVAL HPI/OVERNIGHT EVENTS: Pt seen and evaluated at the bedside. No acute overnight events occurred. Patient states she is doing well. Denies any pain at this time. Son called and stated patient sounded confused over the phone, however when patient reevaluated she was AAOx4 knowing her name, where she was, what year it was and what kind of surgery she had. Patient states she is feeling tired today.    ----  PAST MEDICAL & SURGICAL HISTORY:  Hypothyroid      HTN (hypertension)      HF (heart failure)      Anxiety and depression      S/P hysterectomy      FH: cholecystectomy          FAMILY HISTORY:  FH: heart disease (Father)        Home Medications:  amLODIPine 5 mg oral tablet: 1 tab(s) orally once a day (24 May 2022 23:25)  citalopram 40 mg oral tablet: 1 tab(s) orally once a day (24 May 2022 23:25)  Lasix 20 mg oral tablet: 1 tab(s) orally once a day (24 May 2022 23:25)  levothyroxine 50 mcg (0.05 mg) oral tablet: 1 tab(s) orally once a day (24 May 2022 23:25)  metoprolol succinate 50 mg oral tablet, extended release: 1 tab(s) orally once a day (24 May 2022 23:25)  omeprazole 20 mg oral delayed release capsule: 1 cap(s) orally once a day (24 May 2022 23:25)  potassium chloride 8 mEq (600 mg) oral capsule, extended release: 1 cap(s) orally once a day (24 May 2022 23:25)      Allergies    sulfa drugs (Nausea)    Intolerances    codeine (Nausea)      ----  MEDICATIONS  (STANDING):  amLODIPine   Tablet 5 milliGRAM(s) Oral daily  citalopram 40 milliGRAM(s) Oral daily  heparin   Injectable 5000 Unit(s) SubCutaneous every 8 hours  levothyroxine 75 MICROGram(s) Oral daily  melatonin 3 milliGRAM(s) Oral at bedtime  metoprolol succinate ER 50 milliGRAM(s) Oral daily  pantoprazole    Tablet 40 milliGRAM(s) Oral before breakfast  polyethylene glycol 3350 17 Gram(s) Oral at bedtime  senna 2 Tablet(s) Oral at bedtime    MEDICATIONS  (PRN):  benzocaine 15 mG/menthol 3.6 mG Lozenge 1 Lozenge Oral four times a day PRN Sore Throat  ondansetron    Tablet 4 milliGRAM(s) Oral three times a day PRN Nausea and/or Vomiting  traMADol 100 milliGRAM(s) Oral every 6 hours PRN Moderate Pain (4 - 6)  traMADol 50 milliGRAM(s) Oral every 6 hours PRN Mild Pain (1 - 3)      ----  REVIEW OF SYSTEMS:  Constitutional: denies fever, chills  HEENT: denies headache, dizziness, or lightheadedness  Respiratory: denies SOB, cough, or wheezing  Cardiovascular: denies CP, palpitations  Gastrointestinal: denies nausea, vomiting, diarrhea, constipation, abdominal pain, or bloody stools  Genitourinary: denies painful urination, increased frequency, urgency, or bloody urine  Skin/Breast: denies rashes or itching  Musculoskeletal: denies muscle aches, joint swelling, or muscle weakness  Neurologic: denies loss of sensation, numbness, or tingling    ----  PHYSICAL EXAM:  Gen: well appearing, NAD  HEENT: NCAT, PEERLA b/l, EOMI b/l, no conjunctival erythema  Cardio: regular rate and rhythm, +s1s2, no murmurs, rubs, or gallops  Pulm: CTA b/l, no wheezes, rales or rhonchi  Abdomen: soft, nontender, nondistended, +BS x4 quadrants, no guarding  Extremities: no clubbing, cyanosis or edema, +2 pedal pulses, left hip dressing clean, dry and intact  Neuro: AAOx3, sensation intact  Skin: warm and dry      T(C): 36.9 (05-29-22 @ 05:21), Max: 36.9 (05-28-22 @ 13:05)  HR: 64 (05-29-22 @ 05:21) (64 - 70)  BP: 128/47 (05-29-22 @ 05:21) (107/55 - 131/52)  RR: 18 (05-29-22 @ 05:21) (18 - 18)  SpO2: 95% (05-29-22 @ 05:21) (93% - 95%)  Wt(kg): --    ----  I&O's Summary    28 May 2022 07:01  -  29 May 2022 07:00  --------------------------------------------------------  IN: 1500 mL / OUT: 0 mL / NET: 1500 mL        LABS:                        8.3    7.59  )-----------( 176      ( 29 May 2022 08:57 )             25.8     05-29    136  |  102  |  58<H>  ----------------------------<  124<H>  4.0   |  24  |  2.40<H>    Ca    7.4<L>      29 May 2022 08:57  Phos  4.5     05-28  Mg     2.3     05-28    TPro  6.0  /  Alb  2.5<L>  /  TBili  0.3  /  DBili  x   /  AST  26  /  ALT  7<L>  /  AlkPhos  70  05-28        CAPILLARY BLOOD GLUCOSE                    ----

## 2022-06-25 ENCOUNTER — LAB VISIT (OUTPATIENT)
Dept: LAB | Facility: HOSPITAL | Age: 62
End: 2022-06-25
Attending: FAMILY MEDICINE
Payer: COMMERCIAL

## 2022-06-25 DIAGNOSIS — Z00.00 ANNUAL PHYSICAL EXAM: ICD-10-CM

## 2022-06-25 DIAGNOSIS — E78.5 HYPERLIPIDEMIA, UNSPECIFIED HYPERLIPIDEMIA TYPE: ICD-10-CM

## 2022-06-25 DIAGNOSIS — R73.03 PREDIABETES: ICD-10-CM

## 2022-06-25 LAB
ALBUMIN SERPL BCP-MCNC: 3.7 G/DL (ref 3.5–5.2)
ALP SERPL-CCNC: 103 U/L (ref 55–135)
ALT SERPL W/O P-5'-P-CCNC: 26 U/L (ref 10–44)
ANION GAP SERPL CALC-SCNC: 12 MMOL/L (ref 8–16)
AST SERPL-CCNC: 21 U/L (ref 10–40)
BASOPHILS # BLD AUTO: 0.03 K/UL (ref 0–0.2)
BASOPHILS NFR BLD: 0.7 % (ref 0–1.9)
BILIRUB SERPL-MCNC: 0.4 MG/DL (ref 0.1–1)
BUN SERPL-MCNC: 19 MG/DL (ref 6–20)
CALCIUM SERPL-MCNC: 9.5 MG/DL (ref 8.7–10.5)
CHLORIDE SERPL-SCNC: 102 MMOL/L (ref 95–110)
CHOLEST SERPL-MCNC: 187 MG/DL (ref 120–199)
CHOLEST/HDLC SERPL: 2.8 {RATIO} (ref 2–5)
CO2 SERPL-SCNC: 27 MMOL/L (ref 23–29)
CREAT SERPL-MCNC: 0.8 MG/DL (ref 0.5–1.4)
DIFFERENTIAL METHOD: ABNORMAL
EOSINOPHIL # BLD AUTO: 0.1 K/UL (ref 0–0.5)
EOSINOPHIL NFR BLD: 1.7 % (ref 0–8)
ERYTHROCYTE [DISTWIDTH] IN BLOOD BY AUTOMATED COUNT: 14.6 % (ref 11.5–14.5)
EST. GFR  (AFRICAN AMERICAN): >60 ML/MIN/1.73 M^2
EST. GFR  (NON AFRICAN AMERICAN): >60 ML/MIN/1.73 M^2
ESTIMATED AVG GLUCOSE: 126 MG/DL (ref 68–131)
GLUCOSE SERPL-MCNC: 89 MG/DL (ref 70–110)
HBA1C MFR BLD: 6 % (ref 4–5.6)
HCT VFR BLD AUTO: 37.4 % (ref 37–48.5)
HDLC SERPL-MCNC: 66 MG/DL (ref 40–75)
HDLC SERPL: 35.3 % (ref 20–50)
HGB BLD-MCNC: 11.9 G/DL (ref 12–16)
IMM GRANULOCYTES # BLD AUTO: 0.01 K/UL (ref 0–0.04)
IMM GRANULOCYTES NFR BLD AUTO: 0.2 % (ref 0–0.5)
LDLC SERPL CALC-MCNC: 111.4 MG/DL (ref 63–159)
LYMPHOCYTES # BLD AUTO: 1.3 K/UL (ref 1–4.8)
LYMPHOCYTES NFR BLD: 33.4 % (ref 18–48)
MCH RBC QN AUTO: 28.8 PG (ref 27–31)
MCHC RBC AUTO-ENTMCNC: 31.8 G/DL (ref 32–36)
MCV RBC AUTO: 91 FL (ref 82–98)
MONOCYTES # BLD AUTO: 0.3 K/UL (ref 0.3–1)
MONOCYTES NFR BLD: 8 % (ref 4–15)
NEUTROPHILS # BLD AUTO: 2.2 K/UL (ref 1.8–7.7)
NEUTROPHILS NFR BLD: 56 % (ref 38–73)
NONHDLC SERPL-MCNC: 121 MG/DL
NRBC BLD-RTO: 0 /100 WBC
PLATELET # BLD AUTO: 352 K/UL (ref 150–450)
PMV BLD AUTO: 10.9 FL (ref 9.2–12.9)
POTASSIUM SERPL-SCNC: 4 MMOL/L (ref 3.5–5.1)
PROT SERPL-MCNC: 7.2 G/DL (ref 6–8.4)
RBC # BLD AUTO: 4.13 M/UL (ref 4–5.4)
SODIUM SERPL-SCNC: 141 MMOL/L (ref 136–145)
TRIGL SERPL-MCNC: 48 MG/DL (ref 30–150)
TSH SERPL DL<=0.005 MIU/L-ACNC: 1.1 UIU/ML (ref 0.4–4)
VIT B12 SERPL-MCNC: 777 PG/ML (ref 210–950)
WBC # BLD AUTO: 4.01 K/UL (ref 3.9–12.7)

## 2022-06-25 PROCEDURE — 80053 COMPREHEN METABOLIC PANEL: CPT | Performed by: FAMILY MEDICINE

## 2022-06-25 PROCEDURE — 83036 HEMOGLOBIN GLYCOSYLATED A1C: CPT | Performed by: FAMILY MEDICINE

## 2022-06-25 PROCEDURE — 85025 COMPLETE CBC W/AUTO DIFF WBC: CPT | Performed by: FAMILY MEDICINE

## 2022-06-25 PROCEDURE — 84443 ASSAY THYROID STIM HORMONE: CPT | Performed by: FAMILY MEDICINE

## 2022-06-25 PROCEDURE — 36415 COLL VENOUS BLD VENIPUNCTURE: CPT | Performed by: FAMILY MEDICINE

## 2022-06-25 PROCEDURE — 80061 LIPID PANEL: CPT | Performed by: FAMILY MEDICINE

## 2022-06-25 PROCEDURE — 82607 VITAMIN B-12: CPT | Performed by: FAMILY MEDICINE

## 2022-07-06 ENCOUNTER — OFFICE VISIT (OUTPATIENT)
Dept: PRIMARY CARE CLINIC | Facility: CLINIC | Age: 62
End: 2022-07-06
Payer: COMMERCIAL

## 2022-07-06 VITALS
TEMPERATURE: 98 F | SYSTOLIC BLOOD PRESSURE: 128 MMHG | BODY MASS INDEX: 34.17 KG/M2 | OXYGEN SATURATION: 99 % | RESPIRATION RATE: 18 BRPM | WEIGHT: 181 LBS | HEIGHT: 61 IN | DIASTOLIC BLOOD PRESSURE: 62 MMHG | HEART RATE: 78 BPM

## 2022-07-06 DIAGNOSIS — I65.23 BILATERAL CAROTID ARTERY STENOSIS: ICD-10-CM

## 2022-07-06 DIAGNOSIS — M17.11 PRIMARY OSTEOARTHRITIS OF RIGHT KNEE: ICD-10-CM

## 2022-07-06 DIAGNOSIS — E55.9 VITAMIN D DEFICIENCY: ICD-10-CM

## 2022-07-06 DIAGNOSIS — R73.03 PREDIABETES: ICD-10-CM

## 2022-07-06 DIAGNOSIS — E78.5 HYPERLIPIDEMIA, UNSPECIFIED HYPERLIPIDEMIA TYPE: ICD-10-CM

## 2022-07-06 DIAGNOSIS — D64.9 ANEMIA, UNSPECIFIED TYPE: ICD-10-CM

## 2022-07-06 DIAGNOSIS — Z00.00 ANNUAL PHYSICAL EXAM: Primary | ICD-10-CM

## 2022-07-06 DIAGNOSIS — I10 PRIMARY HYPERTENSION: ICD-10-CM

## 2022-07-06 DIAGNOSIS — Z86.16 HISTORY OF COVID-19: ICD-10-CM

## 2022-07-06 PROCEDURE — 3078F PR MOST RECENT DIASTOLIC BLOOD PRESSURE < 80 MM HG: ICD-10-PCS | Mod: CPTII,S$GLB,, | Performed by: FAMILY MEDICINE

## 2022-07-06 PROCEDURE — 3078F DIAST BP <80 MM HG: CPT | Mod: CPTII,S$GLB,, | Performed by: FAMILY MEDICINE

## 2022-07-06 PROCEDURE — 3074F PR MOST RECENT SYSTOLIC BLOOD PRESSURE < 130 MM HG: ICD-10-PCS | Mod: CPTII,S$GLB,, | Performed by: FAMILY MEDICINE

## 2022-07-06 PROCEDURE — 3008F PR BODY MASS INDEX (BMI) DOCUMENTED: ICD-10-PCS | Mod: CPTII,S$GLB,, | Performed by: FAMILY MEDICINE

## 2022-07-06 PROCEDURE — 3008F BODY MASS INDEX DOCD: CPT | Mod: CPTII,S$GLB,, | Performed by: FAMILY MEDICINE

## 2022-07-06 PROCEDURE — 3044F PR MOST RECENT HEMOGLOBIN A1C LEVEL <7.0%: ICD-10-PCS | Mod: CPTII,S$GLB,, | Performed by: FAMILY MEDICINE

## 2022-07-06 PROCEDURE — 99396 PR PREVENTIVE VISIT,EST,40-64: ICD-10-PCS | Mod: S$GLB,,, | Performed by: FAMILY MEDICINE

## 2022-07-06 PROCEDURE — 99396 PREV VISIT EST AGE 40-64: CPT | Mod: S$GLB,,, | Performed by: FAMILY MEDICINE

## 2022-07-06 PROCEDURE — 3044F HG A1C LEVEL LT 7.0%: CPT | Mod: CPTII,S$GLB,, | Performed by: FAMILY MEDICINE

## 2022-07-06 PROCEDURE — 99999 PR PBB SHADOW E&M-EST. PATIENT-LVL III: CPT | Mod: PBBFAC,,, | Performed by: FAMILY MEDICINE

## 2022-07-06 PROCEDURE — 99999 PR PBB SHADOW E&M-EST. PATIENT-LVL III: ICD-10-PCS | Mod: PBBFAC,,, | Performed by: FAMILY MEDICINE

## 2022-07-06 PROCEDURE — 3074F SYST BP LT 130 MM HG: CPT | Mod: CPTII,S$GLB,, | Performed by: FAMILY MEDICINE

## 2022-07-06 RX ORDER — NAPROXEN 500 MG/1
500 TABLET ORAL DAILY PRN
Qty: 30 TABLET | Refills: 0 | Status: SHIPPED | OUTPATIENT
Start: 2022-07-06

## 2022-07-06 RX ORDER — METFORMIN HYDROCHLORIDE 500 MG/1
1000 TABLET, EXTENDED RELEASE ORAL
Qty: 180 TABLET | Refills: 3 | Status: SHIPPED | OUTPATIENT
Start: 2022-07-06 | End: 2023-01-09 | Stop reason: SDUPTHER

## 2022-07-06 NOTE — PROGRESS NOTES
"Subjective:       Patient ID: Jyoti Blue is a 61 y.o. female.    Chief Complaint: Annual Exam    HPI  62 y/o female with HLPD, OA of R knee, CLBP, Vit D Def, Prediabetes, PVC's,HTN, hx of Covid 1/2021 is here for annual exam.     She is feeling good overall, she denies f/n/v/d/constipation/cp/sob/urinary sx.  Her palpitations are now controlled on diltiazem. She is trying to stay active and she does some walking.   She is trying to eat healthy. She is sleeping well.  Mood good.      Hx of Covid-19 1/2021  PCV's/Palpitations/HTN: following with Cardiology, Diltiazem 180 mg daily  ENT Dr. Terrell, gets cerumen impaction and has to get her ears cleaned every 6 months  R knee pain on and off for 10 years: pain is achy and comes and goes, she denies knee swelling, she has been to ortho and had multiple injections, she repots taking Naproxen 1-2 days a month   GYN: followed by Dr. Quita Bess, pelvic utd, mmg DIS 12/2021   Prediabetes: a1c 6.0 6/2022, metformin  mg daily  Vitamin D def: Vitamin D3 2,000 IU daily gummies  HLPD, Mild carotid disease: repeat Carotid US in 2024; asa 81 mg daily, Pravastatin 80 mg daily  OTC: asa 81 mg daily  Colonoscopy 2011, orders in, repeat due now  Eye exam followed by Dr. Kirby  Dental utd    Labs 6/2022 reviewed     Review of Systems  see HPI  Objective:      /62 (BP Location: Left arm, Patient Position: Sitting, BP Method: Small (Manual))   Pulse 78   Temp 97.7 °F (36.5 °C) (Oral)   Resp 18   Ht 5' 1" (1.549 m)   Wt 82.1 kg (181 lb)   SpO2 99%   BMI 34.20 kg/m²   Physical Exam  Vitals and nursing note reviewed.   Constitutional:       Appearance: She is well-developed.   HENT:      Head: Normocephalic and atraumatic.      Mouth/Throat:      Mouth: Mucous membranes are moist.      Pharynx: Oropharynx is clear. No oropharyngeal exudate or posterior oropharyngeal erythema.   Neck:      Thyroid: No thyromegaly.   Cardiovascular:      Rate and Rhythm: Normal " rate and regular rhythm.      Heart sounds: Normal heart sounds.   Pulmonary:      Effort: Pulmonary effort is normal. No respiratory distress.      Breath sounds: Normal breath sounds.   Abdominal:      General: Abdomen is flat. Bowel sounds are normal. There is no distension.      Palpations: Abdomen is soft. There is no mass.      Tenderness: There is no abdominal tenderness.   Musculoskeletal:      Cervical back: Normal range of motion and neck supple.      Right lower leg: No edema.      Left lower leg: No edema.   Lymphadenopathy:      Cervical: No cervical adenopathy.   Skin:     General: Skin is warm and dry.   Neurological:      Mental Status: She is alert.         Assessment:       1. Annual physical exam    2. Primary osteoarthritis of right knee    3. Prediabetes    4. History of COVID-19    5. Vitamin D deficiency    6. Hyperlipidemia, unspecified hyperlipidemia type    7. Primary hypertension    8. Anemia, unspecified type    9. Bilateral carotid artery stenosis        Plan:   Jyoti was seen today for annual exam.    Diagnoses and all orders for this visit:    Annual physical exam    Primary osteoarthritis of right knee  -     naproxen (NAPROSYN) 500 MG tablet; Take 1 tablet (500 mg total) by mouth daily as needed.    Prediabetes  -     Hemoglobin A1C; Future  -     metFORMIN (GLUCOPHAGE-XR) 500 MG ER 24hr tablet; Take 2 tablets (1,000 mg total) by mouth daily with breakfast.    History of COVID-19    Vitamin D deficiency  -     Vitamin D; Future    Hyperlipidemia, unspecified hyperlipidemia type    Primary hypertension  -     Comprehensive Metabolic Panel; Future    Anemia, unspecified type  -     Iron and TIBC; Future  -     Ferritin; Future    Bilateral carotid artery stenosis

## 2022-08-08 ENCOUNTER — TELEPHONE (OUTPATIENT)
Dept: PRIMARY CARE CLINIC | Facility: CLINIC | Age: 62
End: 2022-08-08
Payer: COMMERCIAL

## 2022-08-08 NOTE — TELEPHONE ENCOUNTER
----- Message from Rosalba Seth sent at 8/8/2022  8:20 AM CDT -----  Contact: pt [phone]  Patient was exposed to COVID-19 over the weekend and has questions.    Please call and advise.    Thank You

## 2022-12-19 ENCOUNTER — IMMUNIZATION (OUTPATIENT)
Dept: INTERNAL MEDICINE | Facility: CLINIC | Age: 62
End: 2022-12-19
Payer: COMMERCIAL

## 2022-12-19 DIAGNOSIS — Z23 NEED FOR VACCINATION: Primary | ICD-10-CM

## 2022-12-19 PROCEDURE — 0124A COVID-19, MRNA, LNP-S, BIVALENT BOOSTER, PF, 30 MCG/0.3 ML DOSE: CPT | Mod: CV19,PBBFAC | Performed by: INTERNAL MEDICINE

## 2022-12-19 PROCEDURE — 91312 COVID-19, MRNA, LNP-S, BIVALENT BOOSTER, PF, 30 MCG/0.3 ML DOSE: ICD-10-PCS | Mod: S$GLB,,, | Performed by: INTERNAL MEDICINE

## 2022-12-19 PROCEDURE — 91312 COVID-19, MRNA, LNP-S, BIVALENT BOOSTER, PF, 30 MCG/0.3 ML DOSE: CPT | Mod: S$GLB,,, | Performed by: INTERNAL MEDICINE

## 2022-12-22 LAB
HPV MRNA E6/E7: POSITIVE
PAP RECOMMENDATION EXT: NORMAL

## 2022-12-29 ENCOUNTER — LAB VISIT (OUTPATIENT)
Dept: LAB | Facility: HOSPITAL | Age: 62
End: 2022-12-29
Attending: FAMILY MEDICINE
Payer: COMMERCIAL

## 2022-12-29 DIAGNOSIS — D64.9 ANEMIA, UNSPECIFIED TYPE: ICD-10-CM

## 2022-12-29 DIAGNOSIS — I10 PRIMARY HYPERTENSION: ICD-10-CM

## 2022-12-29 DIAGNOSIS — R73.03 PREDIABETES: ICD-10-CM

## 2022-12-29 DIAGNOSIS — E55.9 VITAMIN D DEFICIENCY: ICD-10-CM

## 2022-12-29 LAB
25(OH)D3+25(OH)D2 SERPL-MCNC: 32 NG/ML (ref 30–96)
ALBUMIN SERPL BCP-MCNC: 3.8 G/DL (ref 3.5–5.2)
ALP SERPL-CCNC: 102 U/L (ref 55–135)
ALT SERPL W/O P-5'-P-CCNC: 17 U/L (ref 10–44)
ANION GAP SERPL CALC-SCNC: 13 MMOL/L (ref 8–16)
AST SERPL-CCNC: 19 U/L (ref 10–40)
BCS RECOMMENDATION EXT: NORMAL
BILIRUB SERPL-MCNC: 0.3 MG/DL (ref 0.1–1)
BUN SERPL-MCNC: 18 MG/DL (ref 8–23)
CALCIUM SERPL-MCNC: 9.5 MG/DL (ref 8.7–10.5)
CHLORIDE SERPL-SCNC: 104 MMOL/L (ref 95–110)
CO2 SERPL-SCNC: 23 MMOL/L (ref 23–29)
CREAT SERPL-MCNC: 0.9 MG/DL (ref 0.5–1.4)
EST. GFR  (NO RACE VARIABLE): >60 ML/MIN/1.73 M^2
ESTIMATED AVG GLUCOSE: 128 MG/DL (ref 68–131)
FERRITIN SERPL-MCNC: 79 NG/ML (ref 20–300)
GLUCOSE SERPL-MCNC: 143 MG/DL (ref 70–110)
HBA1C MFR BLD: 6.1 % (ref 4–5.6)
IRON SERPL-MCNC: 62 UG/DL (ref 30–160)
POTASSIUM SERPL-SCNC: 3.7 MMOL/L (ref 3.5–5.1)
PROT SERPL-MCNC: 7.8 G/DL (ref 6–8.4)
SATURATED IRON: 16 % (ref 20–50)
SODIUM SERPL-SCNC: 140 MMOL/L (ref 136–145)
TOTAL IRON BINDING CAPACITY: 391 UG/DL (ref 250–450)
TRANSFERRIN SERPL-MCNC: 264 MG/DL (ref 200–375)

## 2022-12-29 PROCEDURE — 80053 COMPREHEN METABOLIC PANEL: CPT | Performed by: FAMILY MEDICINE

## 2022-12-29 PROCEDURE — 84466 ASSAY OF TRANSFERRIN: CPT | Performed by: FAMILY MEDICINE

## 2022-12-29 PROCEDURE — 82306 VITAMIN D 25 HYDROXY: CPT | Performed by: FAMILY MEDICINE

## 2022-12-29 PROCEDURE — 36415 COLL VENOUS BLD VENIPUNCTURE: CPT | Performed by: FAMILY MEDICINE

## 2022-12-29 PROCEDURE — 83036 HEMOGLOBIN GLYCOSYLATED A1C: CPT | Performed by: FAMILY MEDICINE

## 2022-12-29 PROCEDURE — 82728 ASSAY OF FERRITIN: CPT | Performed by: FAMILY MEDICINE

## 2023-01-09 ENCOUNTER — OFFICE VISIT (OUTPATIENT)
Dept: PRIMARY CARE CLINIC | Facility: CLINIC | Age: 63
End: 2023-01-09
Payer: COMMERCIAL

## 2023-01-09 VITALS
TEMPERATURE: 99 F | SYSTOLIC BLOOD PRESSURE: 118 MMHG | BODY MASS INDEX: 35.18 KG/M2 | OXYGEN SATURATION: 98 % | DIASTOLIC BLOOD PRESSURE: 60 MMHG | HEIGHT: 61 IN | RESPIRATION RATE: 18 BRPM | WEIGHT: 186.31 LBS | HEART RATE: 81 BPM

## 2023-01-09 DIAGNOSIS — E78.5 HYPERLIPIDEMIA, UNSPECIFIED HYPERLIPIDEMIA TYPE: Primary | ICD-10-CM

## 2023-01-09 DIAGNOSIS — I10 PRIMARY HYPERTENSION: Chronic | ICD-10-CM

## 2023-01-09 DIAGNOSIS — Z12.31 SCREENING MAMMOGRAM FOR BREAST CANCER: ICD-10-CM

## 2023-01-09 DIAGNOSIS — Z00.00 ANNUAL PHYSICAL EXAM: ICD-10-CM

## 2023-01-09 DIAGNOSIS — M79.671 FOOT PAIN, BILATERAL: ICD-10-CM

## 2023-01-09 DIAGNOSIS — R73.03 PREDIABETES: ICD-10-CM

## 2023-01-09 DIAGNOSIS — M79.672 FOOT PAIN, BILATERAL: ICD-10-CM

## 2023-01-09 DIAGNOSIS — E55.9 VITAMIN D DEFICIENCY: ICD-10-CM

## 2023-01-09 DIAGNOSIS — Z12.11 SCREEN FOR COLON CANCER: ICD-10-CM

## 2023-01-09 PROCEDURE — 1159F MED LIST DOCD IN RCRD: CPT | Mod: CPTII,S$GLB,, | Performed by: FAMILY MEDICINE

## 2023-01-09 PROCEDURE — 99999 PR PBB SHADOW E&M-EST. PATIENT-LVL V: ICD-10-PCS | Mod: PBBFAC,,, | Performed by: FAMILY MEDICINE

## 2023-01-09 PROCEDURE — 3008F PR BODY MASS INDEX (BMI) DOCUMENTED: ICD-10-PCS | Mod: CPTII,S$GLB,, | Performed by: FAMILY MEDICINE

## 2023-01-09 PROCEDURE — 3074F SYST BP LT 130 MM HG: CPT | Mod: CPTII,S$GLB,, | Performed by: FAMILY MEDICINE

## 2023-01-09 PROCEDURE — 3078F DIAST BP <80 MM HG: CPT | Mod: CPTII,S$GLB,, | Performed by: FAMILY MEDICINE

## 2023-01-09 PROCEDURE — 90471 IMMUNIZATION ADMIN: CPT | Mod: S$GLB,,, | Performed by: FAMILY MEDICINE

## 2023-01-09 PROCEDURE — 90686 IIV4 VACC NO PRSV 0.5 ML IM: CPT | Mod: S$GLB,,, | Performed by: FAMILY MEDICINE

## 2023-01-09 PROCEDURE — 99214 PR OFFICE/OUTPT VISIT, EST, LEVL IV, 30-39 MIN: ICD-10-PCS | Mod: 25,S$GLB,, | Performed by: FAMILY MEDICINE

## 2023-01-09 PROCEDURE — 90471 FLU VACCINE (QUAD) GREATER THAN OR EQUAL TO 3YO PRESERVATIVE FREE IM: ICD-10-PCS | Mod: S$GLB,,, | Performed by: FAMILY MEDICINE

## 2023-01-09 PROCEDURE — 1159F PR MEDICATION LIST DOCUMENTED IN MEDICAL RECORD: ICD-10-PCS | Mod: CPTII,S$GLB,, | Performed by: FAMILY MEDICINE

## 2023-01-09 PROCEDURE — 90686 FLU VACCINE (QUAD) GREATER THAN OR EQUAL TO 3YO PRESERVATIVE FREE IM: ICD-10-PCS | Mod: S$GLB,,, | Performed by: FAMILY MEDICINE

## 2023-01-09 PROCEDURE — 3074F PR MOST RECENT SYSTOLIC BLOOD PRESSURE < 130 MM HG: ICD-10-PCS | Mod: CPTII,S$GLB,, | Performed by: FAMILY MEDICINE

## 2023-01-09 PROCEDURE — 99999 PR PBB SHADOW E&M-EST. PATIENT-LVL V: CPT | Mod: PBBFAC,,, | Performed by: FAMILY MEDICINE

## 2023-01-09 PROCEDURE — 99214 OFFICE O/P EST MOD 30 MIN: CPT | Mod: 25,S$GLB,, | Performed by: FAMILY MEDICINE

## 2023-01-09 PROCEDURE — 3078F PR MOST RECENT DIASTOLIC BLOOD PRESSURE < 80 MM HG: ICD-10-PCS | Mod: CPTII,S$GLB,, | Performed by: FAMILY MEDICINE

## 2023-01-09 PROCEDURE — 3008F BODY MASS INDEX DOCD: CPT | Mod: CPTII,S$GLB,, | Performed by: FAMILY MEDICINE

## 2023-01-09 RX ORDER — ATORVASTATIN CALCIUM 80 MG/1
80 TABLET, FILM COATED ORAL DAILY
Qty: 90 TABLET | Refills: 3 | Status: SHIPPED | OUTPATIENT
Start: 2023-01-09 | End: 2024-02-05

## 2023-01-09 RX ORDER — METFORMIN HYDROCHLORIDE 500 MG/1
1000 TABLET, EXTENDED RELEASE ORAL
Qty: 180 TABLET | Refills: 3 | Status: SHIPPED | OUTPATIENT
Start: 2023-01-09 | End: 2024-03-06

## 2023-01-09 NOTE — PROGRESS NOTES
"Subjective:       Patient ID: Jyoti Blue is a 62 y.o. female.    Chief Complaint: Hypertension, Pre-diabetes, and Results    HPI  61 y/o female with HLPD, OA of R knee, CLBP, Vit D Def, Prediabetes, PVC's,HTN, hx of Covid 1/2021 is here for HTN follow up.     She is feeling good overall, weight is up 5 pounds, plans to start back walking and stationary bike, she is trying to eat healthy, she stopped drinking coffee with chicory. She denies f/n/v/d/constipation/cp/sob/urinary sx. She is sleeping well.  Mood good.     She reports she has stiffness some off and on after prolonged sitting, she also notes her feel get sore a lot, she was told she was flat footed, has not tried inserts     Hx of Covid-19 1/2021  PCV's/Palpitations/HTN: following with Cardiology, Diltiazem 180 mg daily  ENT Dr. Terrell, gets cerumen impaction and has to get her ears cleaned every 6 months  R knee pain on and off for 10 years: pain is achy and comes and goes, she denies knee swelling, she has been to ortho and had multiple injections, she repots taking Naproxen 1-2 days a month   GYN: followed by Dr. Quita Bess, pelvic utd, mmg DIS 12/2022   Prediabetes: a1c 6.1 12/2022, metformin XR 1000 mg daily  Vitamin D def: Vitamin D3 2,000 IU daily gummies  HLPD, Mild carotid disease: repeat Carotid US in 2024; asa 81 mg daily, Pravastatin 80 mg daily  OTC: asa 81 mg daily  Colonoscopy 2011, repeat due now  Eye exam followed by Dr. Kirby  Dental utd    Labs reviewed from December     Review of Systems  see HPI  Objective:      /60 (BP Location: Right arm, Patient Position: Sitting, BP Method: Small (Manual))   Pulse 81   Temp 98.5 °F (36.9 °C) (Oral)   Resp 18   Ht 5' 1" (1.549 m)   Wt 84.5 kg (186 lb 4.6 oz)   SpO2 98%   BMI 35.20 kg/m²   Physical Exam  Vitals and nursing note reviewed.   Constitutional:       Appearance: She is well-developed.   HENT:      Head: Normocephalic and atraumatic.   Neck:      Thyroid: No " thyromegaly.   Cardiovascular:      Rate and Rhythm: Normal rate and regular rhythm.      Heart sounds: Normal heart sounds.   Pulmonary:      Effort: Pulmonary effort is normal. No respiratory distress.      Breath sounds: Normal breath sounds.   Musculoskeletal:      Cervical back: Normal range of motion and neck supple.      Right lower leg: No edema.      Left lower leg: No edema.   Lymphadenopathy:      Cervical: No cervical adenopathy.   Skin:     General: Skin is warm and dry.   Neurological:      Mental Status: She is alert.       Assessment:       1. Hyperlipidemia, unspecified hyperlipidemia type    2. Screen for colon cancer    3. Screening mammogram for breast cancer    4. Prediabetes    5. Primary hypertension    6. Annual physical exam    7. Vitamin D deficiency    8. Foot pain, bilateral        Plan:   Worell was seen today for hypertension, pre-diabetes and results.    Diagnoses and all orders for this visit:    Hyperlipidemia, unspecified hyperlipidemia type  -     atorvastatin (LIPITOR) 80 MG tablet; Take 1 tablet (80 mg total) by mouth once daily.  -     Lipid Panel; Future    Screen for colon cancer  -     Ambulatory referral/consult to Endo Procedure ; Future    Screening mammogram for breast cancer    Prediabetes  -     metFORMIN (GLUCOPHAGE-XR) 500 MG ER 24hr tablet; Take 2 tablets (1,000 mg total) by mouth daily with breakfast.  -     Hemoglobin A1C; Future  -     Lipid Panel; Future  -     Vitamin B12; Future    Primary hypertension  -     CBC Auto Differential; Future  -     Comprehensive Metabolic Panel; Future  -     Lipid Panel; Future    Annual physical exam  -     CBC Auto Differential; Future  -     Comprehensive Metabolic Panel; Future  -     Hemoglobin A1C; Future  -     Lipid Panel; Future  -     Vitamin B12; Future  -     Vitamin D; Future  -     TSH; Future    Vitamin D deficiency  -     Vitamin D; Future    Foot pain, bilateral  -     Ambulatory referral/consult to  Podiatry; Future    Other orders  -     Influenza - Quadrivalent (PF)

## 2023-02-08 DIAGNOSIS — Z12.31 OTHER SCREENING MAMMOGRAM: ICD-10-CM

## 2023-02-13 ENCOUNTER — PATIENT MESSAGE (OUTPATIENT)
Dept: ADMINISTRATIVE | Facility: HOSPITAL | Age: 63
End: 2023-02-13
Payer: COMMERCIAL

## 2023-03-13 ENCOUNTER — OFFICE VISIT (OUTPATIENT)
Dept: PODIATRY | Facility: CLINIC | Age: 63
End: 2023-03-13
Payer: COMMERCIAL

## 2023-03-13 VITALS
WEIGHT: 189.25 LBS | HEIGHT: 61 IN | SYSTOLIC BLOOD PRESSURE: 153 MMHG | HEART RATE: 93 BPM | BODY MASS INDEX: 35.73 KG/M2 | DIASTOLIC BLOOD PRESSURE: 71 MMHG

## 2023-03-13 DIAGNOSIS — M79.672 FOOT PAIN, BILATERAL: ICD-10-CM

## 2023-03-13 DIAGNOSIS — M17.11 PRIMARY OSTEOARTHRITIS OF RIGHT KNEE: ICD-10-CM

## 2023-03-13 DIAGNOSIS — M79.671 FOOT PAIN, BILATERAL: ICD-10-CM

## 2023-03-13 PROCEDURE — 99999 PR PBB SHADOW E&M-EST. PATIENT-LVL IV: CPT | Mod: PBBFAC,,, | Performed by: PODIATRIST

## 2023-03-13 PROCEDURE — 3077F PR MOST RECENT SYSTOLIC BLOOD PRESSURE >= 140 MM HG: ICD-10-PCS | Mod: CPTII,S$GLB,, | Performed by: PODIATRIST

## 2023-03-13 PROCEDURE — 3078F PR MOST RECENT DIASTOLIC BLOOD PRESSURE < 80 MM HG: ICD-10-PCS | Mod: CPTII,S$GLB,, | Performed by: PODIATRIST

## 2023-03-13 PROCEDURE — 99999 PR PBB SHADOW E&M-EST. PATIENT-LVL IV: ICD-10-PCS | Mod: PBBFAC,,, | Performed by: PODIATRIST

## 2023-03-13 PROCEDURE — 99203 OFFICE O/P NEW LOW 30 MIN: CPT | Mod: S$GLB,,, | Performed by: PODIATRIST

## 2023-03-13 PROCEDURE — 3078F DIAST BP <80 MM HG: CPT | Mod: CPTII,S$GLB,, | Performed by: PODIATRIST

## 2023-03-13 PROCEDURE — 3077F SYST BP >= 140 MM HG: CPT | Mod: CPTII,S$GLB,, | Performed by: PODIATRIST

## 2023-03-13 PROCEDURE — 3008F PR BODY MASS INDEX (BMI) DOCUMENTED: ICD-10-PCS | Mod: CPTII,S$GLB,, | Performed by: PODIATRIST

## 2023-03-13 PROCEDURE — 3008F BODY MASS INDEX DOCD: CPT | Mod: CPTII,S$GLB,, | Performed by: PODIATRIST

## 2023-03-13 PROCEDURE — 1159F MED LIST DOCD IN RCRD: CPT | Mod: CPTII,S$GLB,, | Performed by: PODIATRIST

## 2023-03-13 PROCEDURE — 99203 PR OFFICE/OUTPT VISIT, NEW, LEVL III, 30-44 MIN: ICD-10-PCS | Mod: S$GLB,,, | Performed by: PODIATRIST

## 2023-03-13 PROCEDURE — 1159F PR MEDICATION LIST DOCUMENTED IN MEDICAL RECORD: ICD-10-PCS | Mod: CPTII,S$GLB,, | Performed by: PODIATRIST

## 2023-03-13 RX ORDER — MELOXICAM 15 MG/1
15 TABLET ORAL DAILY
Qty: 30 TABLET | Refills: 0 | Status: SHIPPED | OUTPATIENT
Start: 2023-03-13

## 2023-03-13 RX ORDER — DICLOFENAC SODIUM 10 MG/G
2 GEL TOPICAL 4 TIMES DAILY
Qty: 100 G | Refills: 0 | Status: SHIPPED | OUTPATIENT
Start: 2023-03-13

## 2023-03-13 RX ORDER — CEPHALEXIN 500 MG/1
500 CAPSULE ORAL EVERY 12 HOURS
Qty: 14 CAPSULE | Refills: 0 | Status: SHIPPED | OUTPATIENT
Start: 2023-03-13 | End: 2023-03-13

## 2023-03-13 RX ORDER — DEXBROMPHENIRAMINE MALEATE, DEXTROMETHORPHAN HBR, PHENYLEPHRINE HCL 2; 15; 7.5 MG/5ML; MG/5ML; MG/5ML
10 LIQUID ORAL EVERY 6 HOURS PRN
COMMUNITY
Start: 2023-02-22

## 2023-03-16 NOTE — PROGRESS NOTES
Subjective:      Patient ID: Jyoti Blue is a 62 y.o. female.    Chief Complaint:   Foot Pain (bilateral)    Jyoti is a 62 y.o. female who presents to the podiatry clinic  with complaint of  bilateral foot pain. Onset of the symptoms was several months ago. Precipitating event: none known. Current symptoms include: ability to bear weight, but with some pain. Aggravating factors: any weight bearing. Symptoms have gradually worsened. Patient has had no prior foot problems. Evaluation to date: none. Treatment to date: avoidance of offending activity. Patients rates pain 5/10 on pain scale.    Review of Systems   Constitutional: Negative for chills, decreased appetite, fever and malaise/fatigue.   HENT:  Negative for congestion, hearing loss, nosebleeds and tinnitus.    Eyes:  Negative for double vision, pain, photophobia and visual disturbance.   Cardiovascular:  Negative for chest pain, claudication, cyanosis and leg swelling.   Respiratory:  Negative for cough, hemoptysis, shortness of breath and wheezing.    Endocrine: Negative for cold intolerance and heat intolerance.   Hematologic/Lymphatic: Negative for adenopathy and bleeding problem.   Skin:  Negative for color change, dry skin, itching, nail changes and suspicious lesions.   Musculoskeletal:  Positive for arthritis, joint pain, myalgias and stiffness.   Gastrointestinal:  Negative for abdominal pain, jaundice, nausea and vomiting.   Genitourinary:  Negative for dysuria, frequency and hematuria.   Neurological:  Negative for difficulty with concentration, loss of balance, numbness, paresthesias and sensory change.   Psychiatric/Behavioral:  Negative for altered mental status, hallucinations and suicidal ideas. The patient is not nervous/anxious.    Allergic/Immunologic: Negative for environmental allergies and persistent infections.         Objective:      Physical Exam  Vitals reviewed.   Constitutional:       Appearance: She is well-developed.    HENT:      Head: Normocephalic and atraumatic.   Cardiovascular:      Pulses:           Dorsalis pedis pulses are 2+ on the right side and 2+ on the left side.        Posterior tibial pulses are 2+ on the right side and 2+ on the left side.   Pulmonary:      Effort: Pulmonary effort is normal.   Musculoskeletal:         General: Normal range of motion.      Comments: Inspection and palpation of the muscles joints and bones of both lower extremities reveal that muscle strength for the anterior lateral and posterior muscle groups and intrinsic muscle groups of the foot are all 5 over 5 symmetrical.    Tenderness to bilateral midfoot dorsally with topical exostoses.  Positive tenderness with inversion eversion of midtarsal joint.   Skin:     General: Skin is warm and dry.      Capillary Refill: Capillary refill takes 2 to 3 seconds.      Comments: Skin turgor is normal bilaterally.  Skin texture is well hydrated to both lower extremities.  No lesions or rashes or wounds appreciated bilaterally.  Nail plates 1 through 5 bilaterally are within normal limits for length and thickness.  No nail clubbing or incurvation noted.   Neurological:      Mental Status: She is alert and oriented to person, place, and time.      Comments: Sharp dull light touch vibratory proprioceptive sensation are intact bilaterally.  Deep tendon reflexes to patellar and Achilles tendon are symmetrical 2 over 4 bilaterally.  No ankle clonus or Babinski reflexes noted bilaterally.  Coordination is normal to both feet and lower extremities.   Psychiatric:         Behavior: Behavior normal.           Assessment:       Encounter Diagnoses   Name Primary?    Foot pain, bilateral     Primary osteoarthritis of right knee      Independent visualization of imaging was performed.  Results were reviewed in detail with patient.       Plan:       Jyoti was seen today for foot pain.    Diagnoses and all orders for this visit:    Foot pain, bilateral  -      Ambulatory referral/consult to Podiatry    Primary osteoarthritis of right knee  -     diclofenac sodium (VOLTAREN) 1 % Gel; Apply 2 g topically 4 (four) times daily.    Other orders  -     Discontinue: cephALEXin (KEFLEX) 500 MG capsule; Take 1 capsule (500 mg total) by mouth every 12 (twelve) hours.  -     meloxicam (MOBIC) 15 MG tablet; Take 1 tablet (15 mg total) by mouth once daily.      I counseled the patient on her conditions, their implications and medical management.    The nature of the condition, options for management, as well as potential risks and complications were discussed in detail with patient. Patient was amenable to my recommendations and left my office fully informed and will follow up as instructed or sooner if necessary.      I recommended patient be fitted for orthoses.  I explained that orthoses may improve function of the foot, reduce pain, decrease pronation, increase efficiency of muscle function of the foot and ankle and prevent surgery.  Alternative forms of biomechanical control of the foot and ankle were discussed with the patient.

## 2023-04-03 ENCOUNTER — PATIENT MESSAGE (OUTPATIENT)
Dept: ADMINISTRATIVE | Facility: HOSPITAL | Age: 63
End: 2023-04-03
Payer: COMMERCIAL

## 2023-04-13 ENCOUNTER — TELEPHONE (OUTPATIENT)
Dept: OPHTHALMOLOGY | Facility: CLINIC | Age: 63
End: 2023-04-13
Payer: COMMERCIAL

## 2023-04-13 NOTE — TELEPHONE ENCOUNTER
----- Message from Kesha Nieves MA sent at 4/13/2023  8:31 AM CDT -----    ----- Message -----  From: Gonzalo Rutledge  Sent: 4/13/2023   8:28 AM CDT  To: [facility] Clinical Support Staff    Type:  Sooner Apoointment Request    Caller is requesting a sooner appointment.  Caller declined first available appointment listed below.  Caller will not accept being placed on the waitlist and is requesting a message be sent to doctor.  Name of Caller:Jyoti Blue     When is the first available appointment?4/17/23     Symptoms:Itching left eye  Pain     Would the patient rather a call back or a response via [facility]? Call back     Best Call Back Number: [phone] (mobile)     Additional Information: Pt requesting an appointment for today or tomorrow if possible

## 2023-04-14 ENCOUNTER — OFFICE VISIT (OUTPATIENT)
Dept: OPTOMETRY | Facility: CLINIC | Age: 63
End: 2023-04-14
Payer: COMMERCIAL

## 2023-04-14 DIAGNOSIS — H10.12 ALLERGIC CONJUNCTIVITIS OF LEFT EYE: Primary | ICD-10-CM

## 2023-04-14 PROCEDURE — 99999 PR PBB SHADOW E&M-EST. PATIENT-LVL III: ICD-10-PCS | Mod: PBBFAC,,,

## 2023-04-14 PROCEDURE — 99999 PR PBB SHADOW E&M-EST. PATIENT-LVL III: CPT | Mod: PBBFAC,,,

## 2023-04-14 PROCEDURE — 99213 PR OFFICE/OUTPT VISIT, EST, LEVL III, 20-29 MIN: ICD-10-PCS | Mod: S$GLB,,,

## 2023-04-14 PROCEDURE — 99213 OFFICE O/P EST LOW 20 MIN: CPT | Mod: S$GLB,,,

## 2023-04-14 RX ORDER — PREDNISOLONE ACETATE 10 MG/ML
1 SUSPENSION/ DROPS OPHTHALMIC 3 TIMES DAILY
Qty: 5 ML | Refills: 0 | Status: SHIPPED | OUTPATIENT
Start: 2023-04-14 | End: 2023-04-19

## 2023-04-14 NOTE — PROGRESS NOTES
HPI    Urgent visit: swollen OS    Pt sts over the last two days OS under eye has been swollen, itchy, and   FBS. Pt denies crusting, matting, or pain. Pt denies use of gtt, has just   been cleaning eye well.   Last edited by Joshua Rodriguez, OD on 4/14/2023  9:21 AM.            Assessment /Plan     For exam results, see Encounter Report.    Allergic conjunctivitis of left eye  -     prednisoLONE acetate (PRED FORTE) 1 % DrpS; Place 1 drop into the left eye 3 (three) times daily. for 5 days  Dispense: 5 mL; Refill: 0      Mild signs and symptoms of allergic conjunctivitis OS>OD. Lotemax and FML not on formulary, rx'd Pred Forte to use TID OS x 5-7 days. Ok to use OD as well if symptoms arise. Recommended cool compresses and OTC allergy meds, as pt has systemic allergy symptoms as well. Gave OTC allergy drop recommendations to use daily and OTC AT's for comfort. Ed pt to call or message if no improvement or worsening of symptoms.    RTC: in-office prn.

## 2023-05-10 ENCOUNTER — CLINICAL SUPPORT (OUTPATIENT)
Dept: ENDOSCOPY | Facility: HOSPITAL | Age: 63
End: 2023-05-10
Attending: FAMILY MEDICINE
Payer: COMMERCIAL

## 2023-05-10 VITALS — BODY MASS INDEX: 36.91 KG/M2 | WEIGHT: 188 LBS | HEIGHT: 60 IN

## 2023-05-10 DIAGNOSIS — Z12.11 SCREEN FOR COLON CANCER: ICD-10-CM

## 2023-05-10 RX ORDER — SODIUM, POTASSIUM,MAG SULFATES 17.5-3.13G
SOLUTION, RECONSTITUTED, ORAL ORAL
Qty: 1 KIT | Refills: 0 | Status: SHIPPED | OUTPATIENT
Start: 2023-05-10 | End: 2023-06-20 | Stop reason: SDUPTHER

## 2023-05-11 ENCOUNTER — TELEPHONE (OUTPATIENT)
Dept: ENDOSCOPY | Facility: HOSPITAL | Age: 63
End: 2023-05-11
Payer: COMMERCIAL

## 2023-05-11 NOTE — TELEPHONE ENCOUNTER
Spoke with patient. Patient called to reschedule procedure Patient is reschedule for 06/23/2023 08:45 am , patient confirm date and time of procedure Patient verbalized understanding

## 2023-05-15 ENCOUNTER — TELEPHONE (OUTPATIENT)
Dept: PODIATRY | Facility: CLINIC | Age: 63
End: 2023-05-15
Payer: COMMERCIAL

## 2023-05-15 ENCOUNTER — PATIENT MESSAGE (OUTPATIENT)
Dept: PODIATRY | Facility: CLINIC | Age: 63
End: 2023-05-15
Payer: COMMERCIAL

## 2023-05-15 NOTE — TELEPHONE ENCOUNTER
Left detail message appointment for today with Dr. Stark is cancelled reschedule for 6/19 added to call list for sooner appointment.

## 2023-06-20 ENCOUNTER — TELEPHONE (OUTPATIENT)
Dept: ENDOSCOPY | Facility: HOSPITAL | Age: 63
End: 2023-06-20
Payer: COMMERCIAL

## 2023-06-20 DIAGNOSIS — Z12.11 SPECIAL SCREENING FOR MALIGNANT NEOPLASMS, COLON: Primary | ICD-10-CM

## 2023-06-20 RX ORDER — SODIUM, POTASSIUM,MAG SULFATES 17.5-3.13G
1 SOLUTION, RECONSTITUTED, ORAL ORAL DAILY
Qty: 1 KIT | Refills: 0 | Status: ON HOLD | OUTPATIENT
Start: 2023-06-20 | End: 2023-06-23 | Stop reason: HOSPADM

## 2023-06-20 NOTE — TELEPHONE ENCOUNTER
Upon precall patient stated prep needed to be resent to pharmacy. Contacted patient. No answer. LVM letting patient know prescription resent to [facility] on file.

## 2023-06-23 ENCOUNTER — HOSPITAL ENCOUNTER (OUTPATIENT)
Facility: HOSPITAL | Age: 63
Discharge: HOME | End: 2023-06-23
Attending: INTERNAL MEDICINE | Admitting: INTERNAL MEDICINE
Payer: COMMERCIAL

## 2023-06-23 ENCOUNTER — ANESTHESIA (OUTPATIENT)
Dept: ENDOSCOPY | Facility: HOSPITAL | Age: 63
End: 2023-06-23
Payer: COMMERCIAL

## 2023-06-23 ENCOUNTER — ANESTHESIA EVENT (OUTPATIENT)
Dept: ENDOSCOPY | Facility: HOSPITAL | Age: 63
End: 2023-06-23
Payer: COMMERCIAL

## 2023-06-23 VITALS
DIASTOLIC BLOOD PRESSURE: 76 MMHG | HEIGHT: 60 IN | BODY MASS INDEX: 36.91 KG/M2 | HEART RATE: 68 BPM | RESPIRATION RATE: 16 BRPM | OXYGEN SATURATION: 98 % | WEIGHT: 188 LBS | TEMPERATURE: 98 F | SYSTOLIC BLOOD PRESSURE: 132 MMHG

## 2023-06-23 DIAGNOSIS — Z12.11 SCREENING FOR COLON CANCER: ICD-10-CM

## 2023-06-23 PROCEDURE — E9220 PRA ENDO ANESTHESIA: ICD-10-PCS | Mod: 33,,, | Performed by: NURSE ANESTHETIST, CERTIFIED REGISTERED

## 2023-06-23 PROCEDURE — 37000008 HC ANESTHESIA 1ST 15 MINUTES: Performed by: INTERNAL MEDICINE

## 2023-06-23 PROCEDURE — 88305 TISSUE EXAM BY PATHOLOGIST: CPT | Mod: 26,,, | Performed by: PATHOLOGY

## 2023-06-23 PROCEDURE — 37000009 HC ANESTHESIA EA ADD 15 MINS: Performed by: INTERNAL MEDICINE

## 2023-06-23 PROCEDURE — 25000003 PHARM REV CODE 250: Performed by: INTERNAL MEDICINE

## 2023-06-23 PROCEDURE — 25000003 PHARM REV CODE 250: Performed by: NURSE ANESTHETIST, CERTIFIED REGISTERED

## 2023-06-23 PROCEDURE — 88305 TISSUE EXAM BY PATHOLOGIST: CPT | Performed by: PATHOLOGY

## 2023-06-23 PROCEDURE — 45385 COLONOSCOPY W/LESION REMOVAL: CPT | Mod: PT | Performed by: INTERNAL MEDICINE

## 2023-06-23 PROCEDURE — 45385 COLONOSCOPY W/LESION REMOVAL: CPT | Mod: 33,,, | Performed by: INTERNAL MEDICINE

## 2023-06-23 PROCEDURE — 45385 PR COLONOSCOPY,REMV LESN,SNARE: ICD-10-PCS | Mod: 33,,, | Performed by: INTERNAL MEDICINE

## 2023-06-23 PROCEDURE — 63600175 PHARM REV CODE 636 W HCPCS: Performed by: NURSE ANESTHETIST, CERTIFIED REGISTERED

## 2023-06-23 PROCEDURE — 88305 TISSUE EXAM BY PATHOLOGIST: ICD-10-PCS | Mod: 26,,, | Performed by: PATHOLOGY

## 2023-06-23 PROCEDURE — 27201089 HC SNARE, DISP (ANY): Performed by: INTERNAL MEDICINE

## 2023-06-23 PROCEDURE — E9220 PRA ENDO ANESTHESIA: HCPCS | Mod: 33,,, | Performed by: NURSE ANESTHETIST, CERTIFIED REGISTERED

## 2023-06-23 RX ORDER — SODIUM CHLORIDE 9 MG/ML
INJECTION, SOLUTION INTRAVENOUS CONTINUOUS
Status: DISCONTINUED | OUTPATIENT
Start: 2023-06-23 | End: 2023-06-23 | Stop reason: HOSPADM

## 2023-06-23 RX ORDER — PROPOFOL 10 MG/ML
VIAL (ML) INTRAVENOUS
Status: DISCONTINUED | OUTPATIENT
Start: 2023-06-23 | End: 2023-06-23

## 2023-06-23 RX ORDER — PROPOFOL 10 MG/ML
VIAL (ML) INTRAVENOUS CONTINUOUS PRN
Status: DISCONTINUED | OUTPATIENT
Start: 2023-06-23 | End: 2023-06-23

## 2023-06-23 RX ORDER — LIDOCAINE HYDROCHLORIDE 20 MG/ML
INJECTION INTRAVENOUS
Status: DISCONTINUED | OUTPATIENT
Start: 2023-06-23 | End: 2023-06-23

## 2023-06-23 RX ADMIN — LIDOCAINE HYDROCHLORIDE 60 MG: 20 INJECTION INTRAVENOUS at 08:06

## 2023-06-23 RX ADMIN — SODIUM CHLORIDE: 0.9 INJECTION, SOLUTION INTRAVENOUS at 08:06

## 2023-06-23 RX ADMIN — PROPOFOL 80 MG: 10 INJECTION, EMULSION INTRAVENOUS at 08:06

## 2023-06-23 RX ADMIN — PROPOFOL 150 MCG/KG/MIN: 10 INJECTION, EMULSION INTRAVENOUS at 08:06

## 2023-06-23 RX ADMIN — SODIUM CHLORIDE: 9 INJECTION, SOLUTION INTRAVENOUS at 08:06

## 2023-06-23 NOTE — [employer] PATIENT INSTRUCTIONS
Discharge Summary/Instructions after an Endoscopic Procedure  Patient Name: Jyoti Blue  Patient MRN: [id number]  Patient YOB: 1960  Friday, June 23, 2023  Rudy Ugalde MD  Dear patient,  As a result of recent federal legislation (The Federal Cures Act), you may   receive lab or pathology results from your procedure in your [facility]   account before your physician is able to contact you. Your physician or   their representative will relay the results to you with their   recommendations at their soonest availability.  Thank you,  RESTRICTIONS:  During your procedure today, you received medications for sedation.  These   medications may affect your judgment, balance and coordination.  Therefore,   for 24 hours, you have the following restrictions:   - DO NOT drive a car, operate machinery, make legal/financial decisions,   sign important papers or drink alcohol.    ACTIVITY:  Today: no heavy lifting, straining or running due to procedural   sedation/anesthesia.  The following day: return to full activity including work.  DIET:  Eat and drink normally unless instructed otherwise.     TREATMENT FOR COMMON SIDE EFFECTS:  - Mild abdominal pain, nausea, belching, bloating or excessive gas:  rest,   eat lightly and use a heating pad.  - Sore Throat: treat with throat lozenges and/or gargle with warm salt   water.  - Because air was used during the procedure, expelling large amounts of air   from your rectum or belching is normal.  - If a bowel prep was taken, you may not have a bowel movement for 1-3 days.    This is normal.  SYMPTOMS TO WATCH FOR AND REPORT TO YOUR PHYSICIAN:  1. Abdominal pain or bloating, other than gas cramps.  2. Chest pain.  3. Back pain.  4. Signs of infection such as: chills or fever occurring within 24 hours   after the procedure.  5. Rectal bleeding, which would show as bright red, maroon, or black stools.   (A tablespoon of blood from the rectum is not serious, especially  if   hemorrhoids are present.)  6. Vomiting.  7. Weakness or dizziness.  GO DIRECTLY TO THE NEAREST EMERGENCY ROOM IF YOU HAVE ANY OF THE FOLLOWING:      Difficulty breathing              Chills and/or fever over 101 F   Persistent vomiting and/or vomiting blood   Severe abdominal pain   Severe chest pain   Black, tarry stools   Bleeding- more than one tablespoon   Any other symptom or condition that you feel may need urgent attention  Your doctor recommends these additional instructions:  If any biopsies were taken, your doctors clinic will contact you in 1 to 2   weeks with any results.  - Discharge patient to home.   - High fiber diet indefinitely.   - Continue present medications.   - Await pathology results.   - Repeat colonoscopy in 7-10 years for surveillance based on pathology   results.   - Return to referring physician as previously scheduled.   - Patient has a contact number available for emergencies.  The signs and   symptoms of potential delayed complications were discussed with the   patient.  Return to normal activities tomorrow.  Written discharge   instructions were provided to the patient.  For questions, problems or results please call your physician - Rudy Ugalde MD at Work:  [phone].  [facility], EMERGENCY ROOM PHONE NUMBER: [phone]  IF A COMPLICATION OR EMERGENCY SITUATION ARISES AND YOU ARE UNABLE TO REACH   YOUR PHYSICIAN - GO DIRECTLY TO THE EMERGENCY ROOM.  Rudy Ugalde MD  6/23/2023 9:18:21 AM  This report has been verified and signed electronically.  Dear patient,  As a result of recent federal legislation (The Federal Cures Act), you may   receive lab or pathology results from your procedure in your [facility]   account before your physician is able to contact you. Your physician or   their representative will relay the results to you with their   recommendations at their soonest availability.  Thank you,  PROVATION

## 2023-06-23 NOTE — ANESTHESIA POSTPROCEDURE EVALUATION
Anesthesia Post Evaluation    Patient: Jyoti Blue    Procedure(s) Performed: Procedure(s) (LRB):  COLONOSCOPY (N/A)    Final Anesthesia Type: general      Patient location during evaluation: GI PACU  Patient participation: Yes- Able to Participate  Level of consciousness: awake and alert  Post-procedure vital signs: reviewed and stable  Pain management: adequate  Airway patency: patent  DIANA mitigation strategies: Multimodal analgesia  PONV status at discharge: No PONV  Anesthetic complications: no      Cardiovascular status: stable  Respiratory status: unassisted and spontaneous ventilation  Hydration status: euvolemic  Follow-up not needed.          Vitals Value Taken Time   /76 06/23/23 0954   Temp 36.6 °C (97.8 °F) 06/23/23 0920   Pulse 68 06/23/23 0954   Resp 16 06/23/23 0954   SpO2 98 % 06/23/23 0954         Event Time   Out of Recovery 10:06:15         Pain/Ke Score: Ke Score: 10 (6/23/2023  9:35 AM)

## 2023-06-23 NOTE — H&P
Short Stay Endoscopy History and Physical    PCP - Francie Yip MD    Procedure - Colonoscopy  ASA - 2  Mallampati - per anesthesia  History of Anesthesia problems - no  Family history Anesthesia problems -  no     HPI:  This is a 62 y.o. female here for evaluation of :     Average Risk Screening: yes  High risk screening: No  History of polyps: No  Anemia: No  Blood in stools: No  Diarrhea: No  Abdominal Pain: No    Review of Systems:  CONSTITUTIONAL: Denies weight change,  fatigue, fevers, chills, night sweats.  CARDIOVASCULAR: Denies chest pain, shortness of breath, orthopnea and edema.  RESPIRATORY: Denies cough, hemoptysis, dyspnea, and wheezing.  GI: See HPI.    Medical History:  Past Medical History:   Diagnosis Date    Allergy     Anemia     Hyperlipidemia        Surgical History:   Past Surgical History:   Procedure Laterality Date    BREAST SURGERY      L sided lumpectomy, was told it was benign    CATARACT EXTRACTION      CATARACT EXTRACTION W/  INTRAOCULAR LENS IMPLANT Right 11/22/2021    Procedure: EXTRACTION, CATARACT, WITH IOL INSERTION;  Surgeon: Bonnie Choe MD;  Location: [facility] OR;  Service: Ophthalmology;  Laterality: Right;  Right    CATARACT EXTRACTION W/  INTRAOCULAR LENS IMPLANT Left 2/7/2022    Procedure: EXTRACTION, CATARACT, WITH IOL INSERTION;  Surgeon: Bonnie Choe MD;  Location: [facility] OR;  Service: Ophthalmology;  Laterality: Left;  LEFT    DILATION AND CURETTAGE OF UTERUS      EYE SURGERY      R eye cataract    ovary removed         Family History:   Family History   Problem Relation Age of Onset    Stroke Mother     Heart disease Mother     Heart disease Father     Acne Neg Hx     Eczema Neg Hx     Psoriasis Neg Hx     Glaucoma Neg Hx     Macular degeneration Neg Hx        Social History:   Social History     Tobacco Use    Smoking status: Former     Packs/day: 0.50     Years: 8.00     Pack years: 4.00     Types: Cigarettes     Quit date: 4/24/1987     Years since  "quittin.1    Smokeless tobacco: Never    Tobacco comments:     quit smoking in 1980s   Substance Use Topics    Alcohol use: Yes     Alcohol/week: 0.0 standard drinks     Comment: "every now and then"    Drug use: No       Allergies: Reviewed.    Medications:  Current Facility-Administered Medications on File Prior to Encounter   Medication Dose Route Frequency Provider Last Rate Last Admin    electrolyte-S (ISOLYTE)   Intravenous Continuous Moncho Solano MD        fentaNYL 50 mcg/mL injection 25 mcg  25 mcg Intravenous Q5 Min PRN Moncho Solano MD        HYDROmorphone (PF) injection 0.2 mg  0.2 mg Intravenous Q5 Min PRN Moncho Solano MD        lactated ringers infusion   Intravenous Continuous Margarita Hanna MD 10 mL/hr at 21 0946 Restarted at 21 0959    lactated ringers infusion   Intravenous Continuous Moncho Solano MD 10 mL/hr at 22 0836 Restarted at 22 0852    LIDOcaine (PF) 10 mg/ml (1%) injection 10 mg  1 mL Intradermal Once Margarita Hanna MD        LIDOcaine (PF) 10 mg/ml (1%) injection 10 mg  1 mL Intradermal Once Moncho Solano MD        oxyCODONE immediate release tablet 5 mg  5 mg Oral Q3H PRN Moncho Solano MD         Current Outpatient Medications on File Prior to Encounter   Medication Sig Dispense Refill    ascorbic acid, vitamin C, (VITAMIN C) 100 MG tablet Take 100 mg by mouth once daily. Pt is unsure of dose      aspirin (ECOTRIN) 81 MG EC tablet Take 1 tablet (81 mg total) by mouth once daily. 90 tablet 1    atorvastatin (LIPITOR) 80 MG tablet Take 1 tablet (80 mg total) by mouth once daily. 90 tablet 3    chlorthalidone (HYGROTEN) 25 MG Tab Take 1 tablet (25 mg total) by mouth once daily. 30 tablet 6    cholecalciferol, vitamin D3, 25 mcg (1,000 unit) Chew Take 5 Int'l Units by mouth once daily. Pt is taking 2000 IU daily      cyanocobalamin (VITAMIN B-12) 100 MCG tablet Take 100 mcg by mouth once daily. Pt is unsure of dose      diltiaZEM " (DILACOR XR) 180 MG CDCR TAKE 1 CAPSULE(180 MG) BY MOUTH EVERY DAY 30 capsule 11    metFORMIN (GLUCOPHAGE-XR) 500 MG ER 24hr tablet Take 2 tablets (1,000 mg total) by mouth daily with breakfast. 180 tablet 3    azelastine (ASTELIN) 137 mcg (0.1 %) nasal spray 1 spray (137 mcg total) by Nasal route 2 (two) times daily. 30 mL 1    cyclobenzaprine (FLEXERIL) 10 MG tablet Take 1 tablet (10 mg total) by mouth 2 (two) times daily as needed. (Patient not taking: Reported on 3/13/2023) 30 tablet 0    diclofenac sodium (VOLTAREN) 1 % Gel Apply 2 g topically 4 (four) times daily. 100 g 0    meloxicam (MOBIC) 15 MG tablet Take 1 tablet (15 mg total) by mouth once daily. 30 tablet 0    naproxen (NAPROSYN) 500 MG tablet Take 1 tablet (500 mg total) by mouth daily as needed. 30 tablet 0    POLYTUSSIN DM,DEXBROMPHENIRMN, 2-7.5-15 mg/5 mL Liqd Take 10 mLs by mouth every 6 (six) hours as needed.         Physical Exam:  Vital Signs:   Vitals:    06/23/23 0806   BP: (!) 150/74   Pulse: 90   Resp: 16   Temp: 97.9 °F (36.6 °C)     General Appearance: Well appearing in no acute distress  ENT: OP clear  Chest: CTA B  CV: RRR, no m/r/g  Abd: s/nt/nd/nabs  Ext: no edema    Labs:  Reviewed    IMPRESSION:    Screening    Plan:  I have explained the risks and benefits of colonoscopy to the patient including but not limited to bleeding, perforation, infection, and death. The patient wishes to proceed with colonoscopy.

## 2023-06-23 NOTE — TRANSFER OF CARE
Anesthesia Transfer of Care Note    Patient: Jyoti Blue    Procedure(s) Performed: Procedure(s) (LRB):  COLONOSCOPY (N/A)    Patient location: GI    Anesthesia Type: general    Transport from OR: Transported from OR on room air with adequate spontaneous ventilation    Post pain: adequate analgesia    Post assessment: no apparent anesthetic complications and tolerated procedure well    Post vital signs: stable    Level of consciousness: awake, alert and oriented    Nausea/Vomiting: no nausea/vomiting    Complications: none    Transfer of care protocol was followed      Last vitals:   Visit Vitals  /68 (BP Location: Left arm, Patient Position: Lying)   Pulse 86   Temp 36.6 °C (97.8 °F)   Resp 16   Ht 5' (1.524 m)   Wt 85.3 kg (188 lb)   SpO2 98%   Breastfeeding No   BMI 36.72 kg/m²

## 2023-06-23 NOTE — ANESTHESIA PREPROCEDURE EVALUATION
06/23/2023  Jyoti Blue is a 62 y.o., female.    Pre-operative evaluation for Procedure(s) (LRB):  COLONOSCOPY (N/A)    Jyoti Blue is a 62 y.o. female     Patient Active Problem List   Diagnosis    Hyperlipidemia    Allergy    Prediabetes    Bilateral carotid artery disease    Primary osteoarthritis of right knee    Lumbar pain    Chronic right-sided low back pain with right-sided sciatica    PVC (premature ventricular contraction)    History of COVID-19    Primary hypertension       Review of patient's allergies indicates:  No Known Allergies    Current Facility-Administered Medications on File Prior to Encounter   Medication Dose Route Frequency Provider Last Rate Last Admin    electrolyte-S (ISOLYTE)   Intravenous Continuous Moncho Solano MD        fentaNYL 50 mcg/mL injection 25 mcg  25 mcg Intravenous Q5 Min PRN Moncho Solano MD        HYDROmorphone (PF) injection 0.2 mg  0.2 mg Intravenous Q5 Min PRN Moncho Solano MD        lactated ringers infusion   Intravenous Continuous Margarita Hanna MD 10 mL/hr at 11/22/21 0946 Restarted at 11/22/21 0959    lactated ringers infusion   Intravenous Continuous Moncho Solano MD 10 mL/hr at 02/07/22 0836 Restarted at 02/07/22 0852    LIDOcaine (PF) 10 mg/ml (1%) injection 10 mg  1 mL Intradermal Once Margarita Hanna MD        LIDOcaine (PF) 10 mg/ml (1%) injection 10 mg  1 mL Intradermal Once Moncho Solano MD        oxyCODONE immediate release tablet 5 mg  5 mg Oral Q3H PRN Moncho Solano MD         Current Outpatient Medications on File Prior to Encounter   Medication Sig Dispense Refill    ascorbic acid, vitamin C, (VITAMIN C) 100 MG tablet Take 100 mg by mouth once daily. Pt is unsure of dose      aspirin (ECOTRIN) 81 MG EC tablet Take 1 tablet (81 mg total) by mouth once daily. 90 tablet 1     atorvastatin (LIPITOR) 80 MG tablet Take 1 tablet (80 mg total) by mouth once daily. 90 tablet 3    azelastine (ASTELIN) 137 mcg (0.1 %) nasal spray 1 spray (137 mcg total) by Nasal route 2 (two) times daily. 30 mL 1    chlorthalidone (HYGROTEN) 25 MG Tab Take 1 tablet (25 mg total) by mouth once daily. 30 tablet 6    cholecalciferol, vitamin D3, 25 mcg (1,000 unit) Chew Take 5 Int'l Units by mouth once daily. Pt is taking 2000 IU daily      cyanocobalamin (VITAMIN B-12) 100 MCG tablet Take 100 mcg by mouth once daily. Pt is unsure of dose      cyclobenzaprine (FLEXERIL) 10 MG tablet Take 1 tablet (10 mg total) by mouth 2 (two) times daily as needed. (Patient not taking: Reported on 3/13/2023) 30 tablet 0    diclofenac sodium (VOLTAREN) 1 % Gel Apply 2 g topically 4 (four) times daily. 100 g 0    diltiaZEM (DILACOR XR) 180 MG CDCR TAKE 1 CAPSULE(180 MG) BY MOUTH EVERY DAY 30 capsule 11    meloxicam (MOBIC) 15 MG tablet Take 1 tablet (15 mg total) by mouth once daily. 30 tablet 0    metFORMIN (GLUCOPHAGE-XR) 500 MG ER 24hr tablet Take 2 tablets (1,000 mg total) by mouth daily with breakfast. 180 tablet 3    naproxen (NAPROSYN) 500 MG tablet Take 1 tablet (500 mg total) by mouth daily as needed. 30 tablet 0    POLYTUSSIN DM,DEXBROMPHENIRMN, 2-7.5-15 mg/5 mL Liqd Take 10 mLs by mouth every 6 (six) hours as needed.         Past Surgical History:   Procedure Laterality Date    BREAST SURGERY      L sided lumpectomy, was told it was benign    CATARACT EXTRACTION      CATARACT EXTRACTION W/  INTRAOCULAR LENS IMPLANT Right 11/22/2021    Procedure: EXTRACTION, CATARACT, WITH IOL INSERTION;  Surgeon: Bonnie Choe MD;  Location: [facility] OR;  Service: Ophthalmology;  Laterality: Right;  Right    CATARACT EXTRACTION W/  INTRAOCULAR LENS IMPLANT Left 2/7/2022    Procedure: EXTRACTION, CATARACT, WITH IOL INSERTION;  Surgeon: Bonnie Choe MD;  Location: [facility] OR;  Service: Ophthalmology;  Laterality: Left;   "LEFT    DILATION AND CURETTAGE OF UTERUS      EYE SURGERY      R eye cataract    ovary removed         Social History     Socioeconomic History    Marital status:     Number of children: 1   Occupational History    Occupation:      Employer: [employer]   Tobacco Use    Smoking status: Former     Packs/day: 0.50     Years: 8.00     Pack years: 4.00     Types: Cigarettes     Quit date: 1987     Years since quittin.1    Smokeless tobacco: Never    Tobacco comments:     quit smoking in    Substance and Sexual Activity    Alcohol use: Yes     Alcohol/week: 0.0 standard drinks     Comment: "every now and then"    Drug use: No   Other Topics Concern    Are you pregnant or think you may be? No    Breast-feeding No         CBC: No results for input(s): WBC, RBC, HGB, HCT, PLT, MCV, MCH, MCHC in the last 72 hours.    CMP: No results for input(s): NA, K, CL, CO2, BUN, CREATININE, GLU, MG, PHOS, CALCIUM, ALBUMIN, PROT, ALKPHOS, ALT, AST, BILITOT in the last 72 hours.    INR  No results for input(s): PT, INR, PROTIME, APTT in the last 72 hours.        Diagnostic Studies:      EKD Echo:  No results found for this or any previous visit.      Pre-op Assessment    I have reviewed the Patient Summary Reports.     I have reviewed the Nursing Notes.       Review of Systems  Anesthesia Hx:  No problems with previous Anesthesia    Cardiovascular:   Hypertension PVD hyperlipidemia    Musculoskeletal:   Arthritis     Neurological:   Neuromuscular Disease, B/l carotid disease       Physical Exam  General: Well nourished, Cooperative and Alert    Airway:  Mallampati: I   Mouth Opening: Normal  TM Distance: Normal  Tongue: Normal  Neck ROM: Normal ROM    Dental:    Chest/Lungs:  Normal Respiratory Rate    Heart:  Rate: Normal        Anesthesia Plan  Type of Anesthesia, risks & benefits discussed:    Anesthesia Type: Gen ETT, Gen Natural Airway  Intra-op Monitoring " Plan: Standard ASA Monitors  Post Op Pain Control Plan: multimodal analgesia and IV/PO Opioids PRN  Induction:  IV  Airway Plan: Direct, Post-Induction  Informed Consent: Informed consent signed with the Patient and all parties understand the risks and agree with anesthesia plan.  All questions answered.   ASA Score: 2  Day of Surgery Review of History & Physical: H&P Update referred to the surgeon/provider.    Ready For Surgery From Anesthesia Perspective.     .

## 2023-06-23 NOTE — PLAN OF CARE
Patient able to dress self without assistance.  Pt able to ambulate out of department with a steady gait accompanied by staff and family member.

## 2023-06-27 LAB
FINAL PATHOLOGIC DIAGNOSIS: NORMAL
GROSS: NORMAL
Lab: NORMAL

## 2023-07-06 ENCOUNTER — LAB VISIT (OUTPATIENT)
Dept: LAB | Facility: HOSPITAL | Age: 63
End: 2023-07-06
Attending: FAMILY MEDICINE
Payer: COMMERCIAL

## 2023-07-06 DIAGNOSIS — E78.5 HYPERLIPIDEMIA, UNSPECIFIED HYPERLIPIDEMIA TYPE: ICD-10-CM

## 2023-07-06 DIAGNOSIS — Z00.00 ANNUAL PHYSICAL EXAM: ICD-10-CM

## 2023-07-06 DIAGNOSIS — I10 PRIMARY HYPERTENSION: Chronic | ICD-10-CM

## 2023-07-06 DIAGNOSIS — E55.9 VITAMIN D DEFICIENCY: ICD-10-CM

## 2023-07-06 DIAGNOSIS — R73.03 PREDIABETES: ICD-10-CM

## 2023-07-06 LAB
25(OH)D3+25(OH)D2 SERPL-MCNC: 30 NG/ML (ref 30–96)
ALBUMIN SERPL BCP-MCNC: 3.8 G/DL (ref 3.5–5.2)
ALP SERPL-CCNC: 110 U/L (ref 55–135)
ALT SERPL W/O P-5'-P-CCNC: 17 U/L (ref 10–44)
ANION GAP SERPL CALC-SCNC: 11 MMOL/L (ref 8–16)
AST SERPL-CCNC: 21 U/L (ref 10–40)
BASOPHILS # BLD AUTO: 0.04 K/UL (ref 0–0.2)
BASOPHILS NFR BLD: 1.3 % (ref 0–1.9)
BILIRUB SERPL-MCNC: 0.6 MG/DL (ref 0.1–1)
BUN SERPL-MCNC: 17 MG/DL (ref 8–23)
CALCIUM SERPL-MCNC: 10 MG/DL (ref 8.7–10.5)
CHLORIDE SERPL-SCNC: 102 MMOL/L (ref 95–110)
CHOLEST SERPL-MCNC: 215 MG/DL (ref 120–199)
CHOLEST/HDLC SERPL: 3.2 {RATIO} (ref 2–5)
CO2 SERPL-SCNC: 27 MMOL/L (ref 23–29)
CREAT SERPL-MCNC: 0.9 MG/DL (ref 0.5–1.4)
DIFFERENTIAL METHOD: ABNORMAL
EOSINOPHIL # BLD AUTO: 0.1 K/UL (ref 0–0.5)
EOSINOPHIL NFR BLD: 1.6 % (ref 0–8)
ERYTHROCYTE [DISTWIDTH] IN BLOOD BY AUTOMATED COUNT: 15.2 % (ref 11.5–14.5)
EST. GFR  (NO RACE VARIABLE): >60 ML/MIN/1.73 M^2
ESTIMATED AVG GLUCOSE: 126 MG/DL (ref 68–131)
GLUCOSE SERPL-MCNC: 99 MG/DL (ref 70–110)
HBA1C MFR BLD: 6 % (ref 4–5.6)
HCT VFR BLD AUTO: 38.7 % (ref 37–48.5)
HDLC SERPL-MCNC: 67 MG/DL (ref 40–75)
HDLC SERPL: 31.2 % (ref 20–50)
HGB BLD-MCNC: 12.1 G/DL (ref 12–16)
IMM GRANULOCYTES # BLD AUTO: 0 K/UL (ref 0–0.04)
IMM GRANULOCYTES NFR BLD AUTO: 0 % (ref 0–0.5)
LDLC SERPL CALC-MCNC: 137.8 MG/DL (ref 63–159)
LYMPHOCYTES # BLD AUTO: 1.3 K/UL (ref 1–4.8)
LYMPHOCYTES NFR BLD: 39.8 % (ref 18–48)
MCH RBC QN AUTO: 28.1 PG (ref 27–31)
MCHC RBC AUTO-ENTMCNC: 31.3 G/DL (ref 32–36)
MCV RBC AUTO: 90 FL (ref 82–98)
MONOCYTES # BLD AUTO: 0.3 K/UL (ref 0.3–1)
MONOCYTES NFR BLD: 7.8 % (ref 4–15)
NEUTROPHILS # BLD AUTO: 1.6 K/UL (ref 1.8–7.7)
NEUTROPHILS NFR BLD: 49.5 % (ref 38–73)
NONHDLC SERPL-MCNC: 148 MG/DL
NRBC BLD-RTO: 0 /100 WBC
PLATELET # BLD AUTO: 336 K/UL (ref 150–450)
PMV BLD AUTO: 11.6 FL (ref 9.2–12.9)
POTASSIUM SERPL-SCNC: 3.9 MMOL/L (ref 3.5–5.1)
PROT SERPL-MCNC: 7.9 G/DL (ref 6–8.4)
RBC # BLD AUTO: 4.31 M/UL (ref 4–5.4)
SODIUM SERPL-SCNC: 140 MMOL/L (ref 136–145)
TRIGL SERPL-MCNC: 51 MG/DL (ref 30–150)
TSH SERPL DL<=0.005 MIU/L-ACNC: 1.77 UIU/ML (ref 0.4–4)
VIT B12 SERPL-MCNC: 593 PG/ML (ref 210–950)
WBC # BLD AUTO: 3.19 K/UL (ref 3.9–12.7)

## 2023-07-06 PROCEDURE — 84443 ASSAY THYROID STIM HORMONE: CPT | Performed by: FAMILY MEDICINE

## 2023-07-06 PROCEDURE — 80053 COMPREHEN METABOLIC PANEL: CPT | Performed by: FAMILY MEDICINE

## 2023-07-06 PROCEDURE — 82607 VITAMIN B-12: CPT | Performed by: FAMILY MEDICINE

## 2023-07-06 PROCEDURE — 80061 LIPID PANEL: CPT | Performed by: FAMILY MEDICINE

## 2023-07-06 PROCEDURE — 36415 COLL VENOUS BLD VENIPUNCTURE: CPT | Performed by: FAMILY MEDICINE

## 2023-07-06 PROCEDURE — 85025 COMPLETE CBC W/AUTO DIFF WBC: CPT | Performed by: FAMILY MEDICINE

## 2023-07-06 PROCEDURE — 83036 HEMOGLOBIN GLYCOSYLATED A1C: CPT | Performed by: FAMILY MEDICINE

## 2023-07-06 PROCEDURE — 82306 VITAMIN D 25 HYDROXY: CPT | Performed by: FAMILY MEDICINE

## 2023-07-11 ENCOUNTER — PATIENT OUTREACH (OUTPATIENT)
Dept: ADMINISTRATIVE | Facility: HOSPITAL | Age: 63
End: 2023-07-11
Payer: COMMERCIAL

## 2023-07-11 ENCOUNTER — OFFICE VISIT (OUTPATIENT)
Dept: PRIMARY CARE CLINIC | Facility: CLINIC | Age: 63
End: 2023-07-11
Payer: COMMERCIAL

## 2023-07-11 VITALS
HEART RATE: 70 BPM | DIASTOLIC BLOOD PRESSURE: 68 MMHG | BODY MASS INDEX: 36.53 KG/M2 | RESPIRATION RATE: 18 BRPM | SYSTOLIC BLOOD PRESSURE: 132 MMHG | HEIGHT: 60 IN | WEIGHT: 186.06 LBS | OXYGEN SATURATION: 98 %

## 2023-07-11 DIAGNOSIS — Z86.010 HISTORY OF COLON POLYPS: ICD-10-CM

## 2023-07-11 DIAGNOSIS — M17.11 PRIMARY OSTEOARTHRITIS OF RIGHT KNEE: ICD-10-CM

## 2023-07-11 DIAGNOSIS — Z00.00 ANNUAL PHYSICAL EXAM: Primary | ICD-10-CM

## 2023-07-11 DIAGNOSIS — Z86.16 HISTORY OF COVID-19: ICD-10-CM

## 2023-07-11 DIAGNOSIS — R73.03 PREDIABETES: ICD-10-CM

## 2023-07-11 DIAGNOSIS — I10 PRIMARY HYPERTENSION: Chronic | ICD-10-CM

## 2023-07-11 DIAGNOSIS — I65.23 BILATERAL CAROTID ARTERY STENOSIS: ICD-10-CM

## 2023-07-11 DIAGNOSIS — E78.5 HYPERLIPIDEMIA, UNSPECIFIED HYPERLIPIDEMIA TYPE: ICD-10-CM

## 2023-07-11 DIAGNOSIS — K57.90 DIVERTICULOSIS: ICD-10-CM

## 2023-07-11 PROCEDURE — 99396 PR PREVENTIVE VISIT,EST,40-64: ICD-10-PCS | Mod: S$GLB,,, | Performed by: FAMILY MEDICINE

## 2023-07-11 PROCEDURE — 3078F PR MOST RECENT DIASTOLIC BLOOD PRESSURE < 80 MM HG: ICD-10-PCS | Mod: CPTII,S$GLB,, | Performed by: FAMILY MEDICINE

## 2023-07-11 PROCEDURE — 3044F HG A1C LEVEL LT 7.0%: CPT | Mod: CPTII,S$GLB,, | Performed by: FAMILY MEDICINE

## 2023-07-11 PROCEDURE — 3044F PR MOST RECENT HEMOGLOBIN A1C LEVEL <7.0%: ICD-10-PCS | Mod: CPTII,S$GLB,, | Performed by: FAMILY MEDICINE

## 2023-07-11 PROCEDURE — 1159F MED LIST DOCD IN RCRD: CPT | Mod: CPTII,S$GLB,, | Performed by: FAMILY MEDICINE

## 2023-07-11 PROCEDURE — 3078F DIAST BP <80 MM HG: CPT | Mod: CPTII,S$GLB,, | Performed by: FAMILY MEDICINE

## 2023-07-11 PROCEDURE — 1159F PR MEDICATION LIST DOCUMENTED IN MEDICAL RECORD: ICD-10-PCS | Mod: CPTII,S$GLB,, | Performed by: FAMILY MEDICINE

## 2023-07-11 PROCEDURE — 3008F BODY MASS INDEX DOCD: CPT | Mod: CPTII,S$GLB,, | Performed by: FAMILY MEDICINE

## 2023-07-11 PROCEDURE — 99999 PR PBB SHADOW E&M-EST. PATIENT-LVL IV: CPT | Mod: PBBFAC,,, | Performed by: FAMILY MEDICINE

## 2023-07-11 PROCEDURE — 99999 PR PBB SHADOW E&M-EST. PATIENT-LVL IV: ICD-10-PCS | Mod: PBBFAC,,, | Performed by: FAMILY MEDICINE

## 2023-07-11 PROCEDURE — 3008F PR BODY MASS INDEX (BMI) DOCUMENTED: ICD-10-PCS | Mod: CPTII,S$GLB,, | Performed by: FAMILY MEDICINE

## 2023-07-11 PROCEDURE — 99396 PREV VISIT EST AGE 40-64: CPT | Mod: S$GLB,,, | Performed by: FAMILY MEDICINE

## 2023-07-11 PROCEDURE — 3075F PR MOST RECENT SYSTOLIC BLOOD PRESS GE 130-139MM HG: ICD-10-PCS | Mod: CPTII,S$GLB,, | Performed by: FAMILY MEDICINE

## 2023-07-11 PROCEDURE — 3075F SYST BP GE 130 - 139MM HG: CPT | Mod: CPTII,S$GLB,, | Performed by: FAMILY MEDICINE

## 2023-07-11 NOTE — PROGRESS NOTES
Subjective:       Patient ID: Jyoti Blue is a 62 y.o. female.    Chief Complaint: Annual Exam    HPI   63 y/o female with HLPD, OA of R knee, CLBP, Vit D Def, Prediabetes, Diverticulosis, hx of Colon polyps, PVC's,HTN, hx of Covid 1/2021 is here for annual exam.     She is feeling good, she denies f/n/v/d/constipation/cp/sob/urinary sx. She is active and walks a lot at work, no dedicated exercise. She is trying to eat healthy. She is sleeping well.  Mood good.      She reports she has stiffness some off and on after prolonged sitting, she also notes her feel get sore a lot, she was told she was flat footed, has not tried inserts     Hx of Covid-19 1/2021  PCV's/Palpitations/HTN: following with Cardiology, Diltiazem 180 mg daily  ENT Dr. Terrell, gets cerumen impaction and has to get her ears cleaned every 6 months  R knee pain on and off for 10 years: pain is achy and comes and goes, she denies knee swelling, she has been to ortho and had multiple injections, she repots taking Naproxen 1-2 days a month   GYN: followed by Dr. Quita Bess, pelvic utd, mmg DIS 12/2022   Prediabetes: metformin XR 1000 mg daily does not want to increase  Vitamin D def: Vitamin D3 2,000 IU daily gummies  HLPD, Mild carotid disease: repeat Carotid US in 2024; asa 81 mg daily, Pravastatin 80 mg daily  OTC: asa 81 mg daily  Colonoscopy 6/2023 diverticulosis, hemorrhoids, colon polyps, repeat 7 years  Eye exam utd  Dental utd    Labs 7/6/23 reviewed     Review of Systems  see HPI  Objective:      /68 (BP Location: Left arm, Patient Position: Sitting, BP Method: Medium (Manual))   Pulse 70   Resp 18   Ht 5' (1.524 m)   Wt 84.4 kg (186 lb 1.1 oz)   SpO2 98%   BMI 36.34 kg/m²   Physical Exam  Vitals and nursing note reviewed.   Constitutional:       Appearance: She is well-developed.   HENT:      Head: Normocephalic and atraumatic.      Right Ear: Tympanic membrane normal.      Left Ear: There is impacted cerumen.       Mouth/Throat:      Pharynx: No oropharyngeal exudate or posterior oropharyngeal erythema.   Neck:      Thyroid: No thyromegaly.   Cardiovascular:      Rate and Rhythm: Normal rate and regular rhythm.      Heart sounds: Normal heart sounds.   Pulmonary:      Effort: Pulmonary effort is normal. No respiratory distress.      Breath sounds: Normal breath sounds.   Musculoskeletal:      Cervical back: Normal range of motion and neck supple.      Right lower leg: No edema.      Left lower leg: No edema.   Lymphadenopathy:      Cervical: No cervical adenopathy.   Skin:     General: Skin is warm and dry.   Neurological:      Mental Status: She is alert.       Assessment:       1. Annual physical exam    2. Prediabetes    3. Primary hypertension    4. Primary osteoarthritis of right knee    5. Hyperlipidemia, unspecified hyperlipidemia type    6. History of COVID-19    7. Bilateral carotid artery stenosis    8. Diverticulosis    9. History of colon polyps        Plan:   Jyoti was seen today for annual exam.    Diagnoses and all orders for this visit:    Annual physical exam    Prediabetes  -     Hemoglobin A1C; Future    Primary hypertension  -     Comprehensive Metabolic Panel; Future    Primary osteoarthritis of right knee    Hyperlipidemia, unspecified hyperlipidemia type    History of COVID-19    Bilateral carotid artery stenosis    Diverticulosis    History of colon polyps

## 2023-09-22 NOTE — PROGRESS NOTES
Cardiology Clinic Note  Reason for Visit: HLD    HPI:     Jyoti Blue is a 63 y.o. F, who presents for f/u HLD.    She has been doing well. No CV complaints.  Palpitations have greatly improved.  Remains on ASA. No GI//nose bleeding.  BP well controlled.   Unknown snoring or apneic episodes. Easy to fall asleep. No gasping/choking.    Medical: HTN, HLD, carotid artery disease (R ECA >50% stenosis), PVCs  Surgical: Reviewed, as below.  Family: Reviewed, as below. Mother with heart disease. Father with heart disease.  Social: Reviewed, as below. Former smoker 1/2 PPD x4y, quit 1987.    ROS:    As above.  PMH:     Past Medical History:   Diagnosis Date    Allergy     Anemia     Hyperlipidemia      Past Surgical History:   Procedure Laterality Date    BREAST SURGERY      L sided lumpectomy, was told it was benign    CATARACT EXTRACTION      CATARACT EXTRACTION W/  INTRAOCULAR LENS IMPLANT Right 11/22/2021    Procedure: EXTRACTION, CATARACT, WITH IOL INSERTION;  Surgeon: Bonnie Choe MD;  Location: [facility] OR;  Service: Ophthalmology;  Laterality: Right;  Right    CATARACT EXTRACTION W/  INTRAOCULAR LENS IMPLANT Left 2/7/2022    Procedure: EXTRACTION, CATARACT, WITH IOL INSERTION;  Surgeon: Bonnie Choe MD;  Location: [facility] OR;  Service: Ophthalmology;  Laterality: Left;  LEFT    COLONOSCOPY N/A 6/23/2023    Procedure: COLONOSCOPY;  Surgeon: Rudy Ugalde MD;  Location: [address]);  Service: Endoscopy;  Laterality: N/A;  referred by / trish rivas [id number]  pre call 6/19, pt need prep prescription to be send again - mf    DILATION AND CURETTAGE OF UTERUS      EYE SURGERY      R eye cataract    ovary removed       Allergies:   Review of patient's allergies indicates:  No Known Allergies  Medications:     Current Outpatient Medications on File Prior to Visit   Medication Sig Dispense Refill    ascorbic acid, vitamin C, (VITAMIN C) 100 MG tablet Take 100 mg by mouth  once daily. Pt is unsure of dose      aspirin (ECOTRIN) 81 MG EC tablet Take 1 tablet (81 mg total) by mouth once daily. 90 tablet 1    atorvastatin (LIPITOR) 80 MG tablet Take 1 tablet (80 mg total) by mouth once daily. 90 tablet 3    azelastine (ASTELIN) 137 mcg (0.1 %) nasal spray 1 spray (137 mcg total) by Nasal route 2 (two) times daily. 30 mL 1    chlorthalidone (HYGROTEN) 25 MG Tab Take 1 tablet (25 mg total) by mouth once daily. 30 tablet 6    cholecalciferol, vitamin D3, 25 mcg (1,000 unit) Chew Take 5 Int'l Units by mouth once daily. Pt is taking 2000 IU daily      cyanocobalamin (VITAMIN B-12) 100 MCG tablet Take 100 mcg by mouth once daily. Pt is unsure of dose      cyclobenzaprine (FLEXERIL) 10 MG tablet Take 1 tablet (10 mg total) by mouth 2 (two) times daily as needed. (Patient not taking: Reported on 3/13/2023) 30 tablet 0    diclofenac sodium (VOLTAREN) 1 % Gel Apply 2 g topically 4 (four) times daily. 100 g 0    diltiaZEM (DILACOR XR) 180 MG CDCR TAKE 1 CAPSULE(180 MG) BY MOUTH EVERY DAY 30 capsule 11    meloxicam (MOBIC) 15 MG tablet Take 1 tablet (15 mg total) by mouth once daily. 30 tablet 0    metFORMIN (GLUCOPHAGE-XR) 500 MG ER 24hr tablet Take 2 tablets (1,000 mg total) by mouth daily with breakfast. 180 tablet 3    naproxen (NAPROSYN) 500 MG tablet Take 1 tablet (500 mg total) by mouth daily as needed. 30 tablet 0    POLYTUSSIN DM,DEXBROMPHENIRMN, 2-7.5-15 mg/5 mL Liqd Take 10 mLs by mouth every 6 (six) hours as needed.       Current Facility-Administered Medications on File Prior to Visit   Medication Dose Route Frequency Provider Last Rate Last Admin    electrolyte-S (ISOLYTE)   Intravenous Continuous Moncho Solano MD        fentaNYL 50 mcg/mL injection 25 mcg  25 mcg Intravenous Q5 Min PRN Moncho Solano MD        HYDROmorphone (PF) injection 0.2 mg  0.2 mg Intravenous Q5 Min PRN Moncho Solano MD        lactated ringers infusion   Intravenous Continuous Margarita Hanna MD 10  "mL/hr at 21 0946 Restarted at 21 0959    lactated ringers infusion   Intravenous Continuous Moncho Solano MD 10 mL/hr at 22 0836 Restarted at 22 0852    LIDOcaine (PF) 10 mg/ml (1%) injection 10 mg  1 mL Intradermal Once Margarita Hanna MD        LIDOcaine (PF) 10 mg/ml (1%) injection 10 mg  1 mL Intradermal Once Moncho Solano MD        oxyCODONE immediate release tablet 5 mg  5 mg Oral Q3H PRN Moncho Solano MD         Social History:     Social History     Tobacco Use    Smoking status: Former     Current packs/day: 0.00     Average packs/day: 0.5 packs/day for 8.0 years (4.0 ttl pk-yrs)     Types: Cigarettes     Start date: 1979     Quit date: 1987     Years since quittin.4    Smokeless tobacco: Never    Tobacco comments:     quit smoking in    Substance Use Topics    Alcohol use: Yes     Alcohol/week: 0.0 standard drinks of alcohol     Comment: "every now and then"     Family History:     Family History   Problem Relation Age of Onset    Stroke Mother     Heart disease Mother     Heart disease Father     Acne Neg Hx     Eczema Neg Hx     Psoriasis Neg Hx     Glaucoma Neg Hx     Macular degeneration Neg Hx     Cancer Neg Hx     Diabetes Neg Hx      Physical Exam:   /68   Pulse 94   Ht 5' (1.524 m)   Wt 84.6 kg (186 lb 8.2 oz)   SpO2 98%   BMI 36.43 kg/m²      Constitutional: No apparent distress, conversant  Neck: No jugular venous distension, no carotid bruits  CV: Regular rate and rhythm, no murmurs  Pulm: Clear to auscultation bilaterally  Extremities: No lower extremity edema, warm with palpable pulses    Labs:     Blood Tests:  Lab Results   Component Value Date    BNP 11 2021     2023    K 3.9 2023     2023    CO2 27 2023    BUN 17 2023    CREATININE 0.9 2023    GLU 99 2023    HGBA1C 6.0 (H) 2023    AST 21 2023    ALT 17 2023    ALBUMIN 3.8 2023    PROT 7.9 " "07/06/2023    BILITOT 0.6 07/06/2023    WBC 3.19 (L) 07/06/2023    HGB 12.1 07/06/2023    HCT 38.7 07/06/2023    MCV 90 07/06/2023     07/06/2023    TSH 1.766 07/06/2023       Lab Results   Component Value Date    CHOL 215 (H) 07/06/2023    HDL 67 07/06/2023    TRIG 51 07/06/2023       Lab Results   Component Value Date    LDLCALC 137.8 07/06/2023       Urine Tests:  Lab Results   Component Value Date    COLORU Yellow 06/25/2022    APPEARANCEUA Cloudy (A) 06/25/2022    PHUR 5.0 06/25/2022    SPECGRAV 1.020 06/25/2022    PROTEINUA Negative 06/25/2022    GLUCUA Negative 06/25/2022    KETONESU Negative 06/25/2022    BILIRUBINUA Negative 06/25/2022    OCCULTUA Negative 06/25/2022    NITRITE Negative 06/25/2022    UROBILINOGEN Negative 07/21/2018    LEUKOCYTESUR 1+ (A) 06/25/2022       Imaging:     Echocardiogram  None    Stress testing  DWIGHT 3/9/21  The test was stopped because the patient experienced fatigue.  During stress, the following significant arrhythmias were observed: frequent PVCs.  The patient's exercise capacity was mildly impaired.  The ECG portion of this study is positive for myocardial ischemia.  The left ventricle is normal in size with normal systolic function. The estimated ejection fraction is 63%  Normal left ventricular diastolic function.  Normal right ventricular size with normal right ventricular systolic function.  The stress echo portion of this study is negative for myocardial ischemia.  LVEF increased to upper 60s and there were no RWMAs despite the abnormal stress ECG ( officially "False Positive") and the HTN BP response to exercise, which reduces the specificity of the echo .    Treadmill 8/9/16  EKG Conclusions:     1. The EKG portion of this study is negative for ischemia at a moderate workload, and peak heart rate of 141 bpm (89% of predicted).   2. Blood pressure response to exercise was normal (Presenting BP: 128/63 Peak BP: 183/82).   3. The following arrhythmias were " present: frequent PVCs.   4. There were no symptoms of chest discomfort or significant dyspnea throughout the protocol.   5. The Pires treadmill score was 5 suggesting a low probability for future cardiovascular events.     Cath Lab  None    Other  Holter 2/12/21  Patient was monitored for 47 hours and 59 min3  The average HR was 87 bpm with a min HR of 52 bpm and max HR of 138 bpm  Ventricular ectopic activity consisted of 2426 beats, of which, 1666 were single PVCs, 726 single VEs, 31 bigeminy, 3 trigeminy  Rhythm included 29 min 15 sec of bradycardia. The slowest episode occurred at 8:51am D1, lasting 15 sec with min HR of 52 bpm  Rhythm included 8 hr 32 min 27 sec of tachycardia. The fastest episode occurred at 2:27pm D2, lasting 7 min 34 sec with max HR of 138 bpm  Supraventricular ectopic activity consisted of 7 beats, of which, 6 were late beats, 1 single PAC.  The longest R-R and N-N interval was 1.6 sec occurring at 3:55am D1  PVCS APPEAR TO BE MONOMORPHIC IN LATE DIASTOLE    Carotid US 1/18/19  <50% bilateral ICA stenosis.  >50% R ECA stenosis.  Antegrade vertebral flow bilaterally.    EKG:   3/9/21 - NSR, LVH, NSTTA (personally reviewed)    Assessment:     1. Hyperlipidemia, unspecified hyperlipidemia type    2. Primary hypertension    3. Bilateral carotid artery stenosis    4. PVC (premature ventricular contraction)      Plan:     PVC (premature ventricular contraction)  Symptoms due to PVCs. Holter monitor shows 3.5% PVC burden.  Stress echo negative for ischemia    Continue diltiazem 180mg qd.  Minimize potential triggers - alcohol, caffeine  Regular exercise, weight control, healthy diet    Refer to sleep clinic for DIANA eval    Hypertension  BP at goal  Continue meds  Low salt, exercise, weight control    Bilateral carotid artery disease  Hyperlipidemia  Asymptomatic. ECA >50% stenosis  Continue ASA, high intensity statin  Discussed Mediterranean diet    Prediabetes  A1c stable, 6%    Signed:  Car  MD Erwin  Cardiology     Follow-up:     Future Appointments   Date Time Provider Department Center   9/25/2023  2:00 PM Woodrow Hood III, MD [facility]

## 2023-09-25 ENCOUNTER — OFFICE VISIT (OUTPATIENT)
Dept: CARDIOLOGY | Facility: CLINIC | Age: 63
End: 2023-09-25
Payer: COMMERCIAL

## 2023-09-25 VITALS
HEART RATE: 94 BPM | DIASTOLIC BLOOD PRESSURE: 68 MMHG | HEIGHT: 60 IN | BODY MASS INDEX: 36.61 KG/M2 | WEIGHT: 186.5 LBS | OXYGEN SATURATION: 98 % | SYSTOLIC BLOOD PRESSURE: 118 MMHG

## 2023-09-25 DIAGNOSIS — E78.5 HYPERLIPIDEMIA, UNSPECIFIED HYPERLIPIDEMIA TYPE: Primary | ICD-10-CM

## 2023-09-25 DIAGNOSIS — I49.3 PVC (PREMATURE VENTRICULAR CONTRACTION): ICD-10-CM

## 2023-09-25 DIAGNOSIS — I10 PRIMARY HYPERTENSION: Chronic | ICD-10-CM

## 2023-09-25 DIAGNOSIS — I65.23 BILATERAL CAROTID ARTERY STENOSIS: ICD-10-CM

## 2023-09-25 PROCEDURE — 99214 OFFICE O/P EST MOD 30 MIN: CPT | Mod: S$GLB,,, | Performed by: INTERNAL MEDICINE

## 2023-09-25 PROCEDURE — 3078F PR MOST RECENT DIASTOLIC BLOOD PRESSURE < 80 MM HG: ICD-10-PCS | Mod: CPTII,S$GLB,, | Performed by: INTERNAL MEDICINE

## 2023-09-25 PROCEDURE — 3078F DIAST BP <80 MM HG: CPT | Mod: CPTII,S$GLB,, | Performed by: INTERNAL MEDICINE

## 2023-09-25 PROCEDURE — 99999 PR PBB SHADOW E&M-EST. PATIENT-LVL V: ICD-10-PCS | Mod: PBBFAC,,, | Performed by: INTERNAL MEDICINE

## 2023-09-25 PROCEDURE — 3008F BODY MASS INDEX DOCD: CPT | Mod: CPTII,S$GLB,, | Performed by: INTERNAL MEDICINE

## 2023-09-25 PROCEDURE — 3044F PR MOST RECENT HEMOGLOBIN A1C LEVEL <7.0%: ICD-10-PCS | Mod: CPTII,S$GLB,, | Performed by: INTERNAL MEDICINE

## 2023-09-25 PROCEDURE — 3008F PR BODY MASS INDEX (BMI) DOCUMENTED: ICD-10-PCS | Mod: CPTII,S$GLB,, | Performed by: INTERNAL MEDICINE

## 2023-09-25 PROCEDURE — 99999 PR PBB SHADOW E&M-EST. PATIENT-LVL V: CPT | Mod: PBBFAC,,, | Performed by: INTERNAL MEDICINE

## 2023-09-25 PROCEDURE — 1159F PR MEDICATION LIST DOCUMENTED IN MEDICAL RECORD: ICD-10-PCS | Mod: CPTII,S$GLB,, | Performed by: INTERNAL MEDICINE

## 2023-09-25 PROCEDURE — 3074F SYST BP LT 130 MM HG: CPT | Mod: CPTII,S$GLB,, | Performed by: INTERNAL MEDICINE

## 2023-09-25 PROCEDURE — 3044F HG A1C LEVEL LT 7.0%: CPT | Mod: CPTII,S$GLB,, | Performed by: INTERNAL MEDICINE

## 2023-09-25 PROCEDURE — 1159F MED LIST DOCD IN RCRD: CPT | Mod: CPTII,S$GLB,, | Performed by: INTERNAL MEDICINE

## 2023-09-25 PROCEDURE — 3074F PR MOST RECENT SYSTOLIC BLOOD PRESSURE < 130 MM HG: ICD-10-PCS | Mod: CPTII,S$GLB,, | Performed by: INTERNAL MEDICINE

## 2023-09-25 PROCEDURE — 99214 PR OFFICE/OUTPT VISIT, EST, LEVL IV, 30-39 MIN: ICD-10-PCS | Mod: S$GLB,,, | Performed by: INTERNAL MEDICINE

## 2023-12-30 LAB — BCS RECOMMENDATION EXT: NORMAL

## 2024-01-12 DIAGNOSIS — Z12.31 OTHER SCREENING MAMMOGRAM: ICD-10-CM

## 2024-02-04 DIAGNOSIS — E78.5 HYPERLIPIDEMIA, UNSPECIFIED HYPERLIPIDEMIA TYPE: ICD-10-CM

## 2024-02-04 NOTE — TELEPHONE ENCOUNTER
Care Due:                  Date            Visit Type   Department     Provider  --------------------------------------------------------------------------------                                EP -                              PRIMARY      [facility]  Last Visit: 07-      CARE ([facility])   Care           Francie Yip  Next Visit: None Scheduled  None         None Found                                                            Last  Test          Frequency    Reason                     Performed    Due Date  --------------------------------------------------------------------------------    HBA1C.......  6 months...  metFORMIN................  07- 01-    [facility] Embedded Care Due Messages. Reference number: [id number].   2/04/2024 11:02:06 AM CST

## 2024-02-05 RX ORDER — ATORVASTATIN CALCIUM 80 MG/1
80 TABLET, FILM COATED ORAL
Qty: 90 TABLET | Refills: 1 | Status: SHIPPED | OUTPATIENT
Start: 2024-02-05

## 2024-02-05 NOTE — TELEPHONE ENCOUNTER
Provider Staff:  Action required for this patient     Please see care gap opportunities below in Care Due Message.    Thanks!  [facility]     Appointments      Date Provider   Last Visit   7/11/2023 Francie Yip MD   Next Visit   Visit date not found Francie Yip MD      Refill Decision Note   Jyoti Blue  is requesting a refill authorization.  Brief Assessment and Rationale for Refill:  Approve     Medication Therapy Plan:         Comments:     Note composed:3:02 AM 02/05/2024

## 2024-03-06 DIAGNOSIS — R73.03 PREDIABETES: ICD-10-CM

## 2024-03-06 RX ORDER — METFORMIN HYDROCHLORIDE 500 MG/1
TABLET, EXTENDED RELEASE ORAL
Qty: 180 TABLET | Refills: 0 | Status: SHIPPED | OUTPATIENT
Start: 2024-03-06

## 2024-03-06 NOTE — TELEPHONE ENCOUNTER
Refill Routing Note   Medication(s) are not appropriate for processing by [facility] for the following reason(s):        No active prescription written by provider  Required labs outdated    [facility] action(s):  Defer               Appointments  past 12m or future 3m with PCP    Date Provider   Last Visit   7/11/2023 Francie Yip MD   Next Visit   7/15/2024 Francie Yip MD   ED visits in past 90 days: 0        Note composed:11:40 AM 03/06/2024

## 2024-03-06 NOTE — TELEPHONE ENCOUNTER
No care due was identified.  [facility] Embedded Care Due Messages. Reference number: [id number].   3/06/2024 5:51:59 AM CST

## 2024-03-21 RX ORDER — CHLORTHALIDONE 25 MG/1
25 TABLET ORAL
Qty: 30 TABLET | Refills: 6 | Status: SHIPPED | OUTPATIENT
Start: 2024-03-21

## 2024-07-15 DIAGNOSIS — R73.03 PREDIABETES: ICD-10-CM

## 2024-07-15 DIAGNOSIS — I10 PRIMARY HYPERTENSION: Chronic | ICD-10-CM

## 2024-07-15 DIAGNOSIS — Z00.00 ANNUAL PHYSICAL EXAM: Primary | ICD-10-CM

## 2024-07-15 DIAGNOSIS — E78.5 HYPERLIPIDEMIA, UNSPECIFIED HYPERLIPIDEMIA TYPE: ICD-10-CM

## 2024-07-15 RX ORDER — METFORMIN HYDROCHLORIDE 500 MG/1
500 TABLET, EXTENDED RELEASE ORAL 2 TIMES DAILY WITH MEALS
Qty: 180 TABLET | Refills: 0 | Status: SHIPPED | OUTPATIENT
Start: 2024-07-15

## 2024-07-15 NOTE — TELEPHONE ENCOUNTER
----- Message from Delisa Mcfarlane sent at 7/15/2024  9:51 AM CDT -----  Contact: [phone]  Requesting an RX refill or new RX.    Is this a refill or new RX: Refill    RX name and strength metFORMIN (GLUCOPHAGE-XR) 500 MG ER 24hr tablet     this a 30 day or 90 day RX: 90    Pharmacy name and phone #     [id number] - [address] AT [facility] [address]  [facility] [place] [address]  Phone: [phone] Fax: [phone]

## 2024-07-15 NOTE — TELEPHONE ENCOUNTER
Care Due:                  Date            Visit Type   Department     Provider  --------------------------------------------------------------------------------                                EP -                              PRIMARY      [facility]  Last Visit: 07-      CARE ([facility])   Care           Francie Yip  Next Visit: None Scheduled  None         None Found                                                            Last  Test          Frequency    Reason                     Performed    Due Date  --------------------------------------------------------------------------------    Office Visit  15 months..  atorvastatin, metFORMIN..  07-   10-    CMP.........  12 months..  atorvastatin, metFORMIN..  07- 06-    HBA1C.......  6 months...  metFORMIN................  07- 01-    Lipid Panel.  12 months..  atorvastatin.............  07- 06-    [facility] Embedded Care Due Messages. Reference number: [id number].   7/15/2024 10:00:27 AM CDT

## 2024-07-15 NOTE — TELEPHONE ENCOUNTER
----- Message from Delisa Eubanks Denys sent at 7/15/2024  9:46 AM CDT -----  Contact: [phone]  type: Lab    Caller is requesting to schedule their Lab appointment prior to an appointment.    Order is not listed in EPIC.  Please enter order and contact patient to schedule.    Preferred Date and Time of Labs: a week before the appt.     Date of Appointment: patient is requesting a sooner appt than 08/27/2024    Where would they like the lab performed? [facility].     Would the patient rather a call back or a response via [facility]? Call back.     Best Call Back Number:call back     Additional Information:  Patient states she has covid and couldn't come for  her today appt. Is asking if she can get a sooner appt than 08/27/2024

## 2024-08-03 ENCOUNTER — OFFICE VISIT (OUTPATIENT)
Dept: URGENT CARE | Facility: CLINIC | Age: 64
End: 2024-08-03
Payer: COMMERCIAL

## 2024-08-03 VITALS
OXYGEN SATURATION: 97 % | HEART RATE: 82 BPM | SYSTOLIC BLOOD PRESSURE: 128 MMHG | BODY MASS INDEX: 38.09 KG/M2 | TEMPERATURE: 99 F | HEIGHT: 60 IN | DIASTOLIC BLOOD PRESSURE: 79 MMHG | RESPIRATION RATE: 16 BRPM | WEIGHT: 194 LBS

## 2024-08-03 DIAGNOSIS — B96.89 BACTERIAL URI: Primary | ICD-10-CM

## 2024-08-03 DIAGNOSIS — J06.9 BACTERIAL URI: Primary | ICD-10-CM

## 2024-08-03 PROCEDURE — 99214 OFFICE O/P EST MOD 30 MIN: CPT | Mod: S$GLB,,, | Performed by: NURSE PRACTITIONER

## 2024-08-03 RX ORDER — DEXBROMPHENIRAMINE MALEATE, DEXTROMETHORPHAN HBR, PHENYLEPHRINE HCL 2; 15; 7.5 MG/5ML; MG/5ML; MG/5ML
5 LIQUID ORAL EVERY 6 HOURS PRN
Qty: 473 ML | Refills: 0 | Status: SHIPPED | OUTPATIENT
Start: 2024-08-03

## 2024-08-03 RX ORDER — AMOXICILLIN AND CLAVULANATE POTASSIUM 875; 125 MG/1; MG/1
1 TABLET, FILM COATED ORAL 2 TIMES DAILY
Qty: 20 TABLET | Refills: 0 | Status: SHIPPED | OUTPATIENT
Start: 2024-08-03 | End: 2024-08-13

## 2024-08-03 NOTE — PROGRESS NOTES
Subjective:      Patient ID: Jyoti Blue is a 64 y.o. female.    Vitals:  height is 5' (1.524 m) and weight is 88 kg (194 lb). Her oral temperature is 98.7 °F (37.1 °C). Her blood pressure is 128/79 and her pulse is 82. Her respiration is 16 and oxygen saturation is 97%.     Chief Complaint: COVID-19 Concerns (Follow up visit )    Follow-up  This is a recurrent problem. The current episode started 1 to 4 weeks ago. The problem occurs constantly. The problem has been unchanged. Associated symptoms include congestion, coughing and fatigue. Pertinent negatives include no fever. Nothing aggravates the symptoms. The treatment provided no relief.       Constitution: Positive for fatigue. Negative for fever.   HENT:  Positive for congestion.    Respiratory:  Positive for cough, sputum production and shortness of breath.       Objective:     Physical Exam   HENT:   Head: Normocephalic.   Ears:   Right Ear: Tympanic membrane and ear canal normal.   Left Ear: Tympanic membrane and ear canal normal.   Mouth/Throat: Posterior oropharyngeal erythema present. No oropharyngeal exudate.   Eyes: Conjunctivae are normal.   Cardiovascular: Normal rate, regular rhythm, normal heart sounds and normal pulses.   Abdominal: Normal appearance.   Musculoskeletal: Normal range of motion.         General: Normal range of motion.   Neurological: She is alert.   Skin: Skin is rash.   Psychiatric: Her behavior is normal. Mood, judgment and thought content normal.   Nursing note and vitals reviewed.      Assessment:     1. Bacterial URI        Plan:       Bacterial URI    Other orders  -     amoxicillin-clavulanate 875-125mg (AUGMENTIN) 875-125 mg per tablet; Take 1 tablet by mouth 2 (two) times daily. for 10 days  Dispense: 20 tablet; Refill: 0  -     POLYTUSSIN DM,DEXBROMPHENIRMN, 2-7.5-15 mg/5 mL Liqd; Take 5 mLs by mouth every 6 (six) hours as needed (cough).  Dispense: 473 mL; Refill: 0      Pt presents approx 3.5 weeks after Covid  Dx and treatment with Paxlovid. She continues to have productive cough. Her VSS. Discussed due to duration of symptoms will treat as presumed secondary bacterial URI infection. Return precautions discussed. PCP FU advised.

## 2024-08-09 RX ORDER — DILTIAZEM HYDROCHLORIDE 180 MG/1
180 CAPSULE, EXTENDED RELEASE ORAL
Qty: 30 CAPSULE | Refills: 11 | Status: SHIPPED | OUTPATIENT
Start: 2024-08-09

## 2024-08-19 ENCOUNTER — OFFICE VISIT (OUTPATIENT)
Dept: SPORTS MEDICINE | Facility: CLINIC | Age: 64
End: 2024-08-19
Payer: COMMERCIAL

## 2024-08-19 ENCOUNTER — HOSPITAL ENCOUNTER (OUTPATIENT)
Dept: RADIOLOGY | Facility: HOSPITAL | Age: 64
Discharge: HOME | End: 2024-08-19
Attending: PHYSICIAN ASSISTANT
Payer: COMMERCIAL

## 2024-08-19 VITALS — WEIGHT: 195.75 LBS | BODY MASS INDEX: 38.23 KG/M2

## 2024-08-19 DIAGNOSIS — M17.11 PRIMARY OSTEOARTHRITIS OF RIGHT KNEE: ICD-10-CM

## 2024-08-19 DIAGNOSIS — S76.311A HAMSTRING STRAIN, RIGHT, INITIAL ENCOUNTER: Primary | ICD-10-CM

## 2024-08-19 DIAGNOSIS — M25.561 ACUTE PAIN OF RIGHT KNEE: ICD-10-CM

## 2024-08-19 PROCEDURE — 3008F BODY MASS INDEX DOCD: CPT | Mod: CPTII,S$GLB,, | Performed by: PHYSICIAN ASSISTANT

## 2024-08-19 PROCEDURE — 73564 X-RAY EXAM KNEE 4 OR MORE: CPT | Mod: TC,50

## 2024-08-19 PROCEDURE — 1160F RVW MEDS BY RX/DR IN RCRD: CPT | Mod: CPTII,S$GLB,, | Performed by: PHYSICIAN ASSISTANT

## 2024-08-19 PROCEDURE — 99999 PR PBB SHADOW E&M-EST. PATIENT-LVL IV: CPT | Mod: PBBFAC,,, | Performed by: PHYSICIAN ASSISTANT

## 2024-08-19 PROCEDURE — 73564 X-RAY EXAM KNEE 4 OR MORE: CPT | Mod: 26,50,, | Performed by: RADIOLOGY

## 2024-08-19 PROCEDURE — 99204 OFFICE O/P NEW MOD 45 MIN: CPT | Mod: S$GLB,,, | Performed by: PHYSICIAN ASSISTANT

## 2024-08-19 PROCEDURE — 1159F MED LIST DOCD IN RCRD: CPT | Mod: CPTII,S$GLB,, | Performed by: PHYSICIAN ASSISTANT

## 2024-08-19 NOTE — PROGRESS NOTES
Subjective:     Chief Complaint: Jyoti Blue is a 64 y.o. female who had concerns including Pain of the Right Knee.    Patient presents to clinic with right knee pain x 3 days. She reports waking up with pain on 8/17/24 with severe pain in the posterior aspect of her knee. Denies any injury but she does report climbing 3 flights of stairs the day before when an elevator was broken. Pain is 9/10 with walking and weight bearing. She has been using an umbrella as a cane to assist her with ambulation. Pain at rest is minimal. She has been taking Advil and Mobic as needed for pain. Denies any effusions, mechanical symptoms, and instability. Pain is located along the medial hamstring. She is ambulating with a painful limp.        Review of Systems   Constitutional: Negative. Negative for chills, fever, weight gain and weight loss.   HENT:  Negative for congestion and sore throat.    Eyes:  Negative for blurred vision and double vision.   Cardiovascular:  Negative for chest pain, leg swelling and palpitations.   Respiratory:  Negative for cough and shortness of breath.    Hematologic/Lymphatic: Does not bruise/bleed easily.   Skin:  Negative for itching, poor wound healing and rash.   Musculoskeletal:  Positive for joint pain. Negative for back pain, joint swelling, muscle weakness, myalgias and stiffness.   Gastrointestinal:  Negative for abdominal pain, constipation, diarrhea, nausea and vomiting.   Genitourinary: Negative.  Negative for frequency and hematuria.   Neurological:  Negative for dizziness, headaches, numbness, paresthesias and sensory change.   Psychiatric/Behavioral:  Negative for altered mental status and depression. The patient is not nervous/anxious.    Allergic/Immunologic: Negative for hives.                 Objective:     General: Jyoti is well-developed, well-nourished, appears stated age, in no acute distress, alert and oriented to time, place and person.     General    Vitals  reviewed.  Constitutional: She is oriented to person, place, and time. She appears well-developed and well-nourished. No distress.   HENT:   Head: Normocephalic and atraumatic.   Eyes: EOM are normal.   Cardiovascular:  Normal rate and regular rhythm.            Pulmonary/Chest: Effort normal. No respiratory distress.   Neurological: She is alert and oriented to person, place, and time. She has normal reflexes. No cranial nerve deficit. Coordination normal.   Psychiatric: She has a normal mood and affect. Her behavior is normal. Judgment and thought content normal.     General Musculoskeletal Exam   Gait: antalgic and abnormal       Right Knee Exam     Inspection   Erythema: absent  Scars: absent  Swelling: absent  Effusion: absent  Deformity: absent  Bruising: absent    Tenderness   The patient is tender to palpation of the medial hamstring.    Range of Motion   Extension:  0 normal   Flexion:  130 normal     Tests   Meniscus   Aviva:  Medial - negative Lateral - negative  Ligament Examination   Lachman: normal (-1 to 2mm)   PCL-Posterior Drawer: normal (0 to 2mm)     MCL - Valgus: normal (0 to 2mm)  LCL - Varus: normal  Pivot Shift: normal (Equal)  Reverse Pivot Shift: normal (Equal)  Posterolateral Corner: stable  Patella   Passive Patellar Tilt: neutral  Patellar Glide (quadrants): Lateral - 1   Medial - 2  Patellar Grind: negative    Other   Sensation: normal    Left Knee Exam     Inspection   Erythema: absent  Scars: absent  Swelling: absent  Effusion: absent  Deformity: absent  Bruising: absent    Tenderness   The patient is experiencing no tenderness.     Crepitus   The patient has crepitus of the patella.    Range of Motion   Extension:  0 normal   Flexion:  130 normal     Tests   Meniscus   Aviva:  Medial - negative Lateral - negative  Stability   Lachman: normal (-1 to 2mm)   PCL-Posterior Drawer: normal (0 to 2mm)  MCL - Valgus: normal (0 to 2mm)  LCL - Varus: normal (0 to 2mm)  Pivot Shift: normal  (Equal)  Reverse Pivot Shift: normal (Equal)  Posterolateral Corner: stable  Patella   Passive Patellar Tilt: neutral  Patellar Glide (Quadrants): Lateral - 1 Medial - 2  Patellar Grind: negative    Other   Sensation: normal    Muscle Strength   Right Lower Extremity   Hip Abduction: 5/5   Quadriceps:  5/5   Hamstrin/5 (pain)   Left Lower Extremity   Hip Abduction: 5/5   Quadriceps:  5/5   Hamstrin/5     Reflexes     Left Side  Achilles:  2+  Quadriceps:  2+    Right Side   Achilles:  2+  Quadriceps:  2+    Vascular Exam     Right Pulses  Dorsalis Pedis:      2+  Posterior Tibial:      2+        Left Pulses  Dorsalis Pedis:      2+  Posterior Tibial:      2+        RADIOGRAPHS: 24  Bilateral knees:  FINDINGS:  DJD with narrowing of the medial tibiofemoral joint spaces slightly more marked on the right side.  No fracture or dislocation.  No bone destruction identified      Assessment:     Encounter Diagnoses   Name Primary?    Primary osteoarthritis of right knee     Hamstring strain, right, initial encounter Yes    Acute pain of right knee         Plan:     We have discussed a variety of treatment options including medications, injections, physical therapy and other alternative treatments. I also explained the indications, risks and benefits of surgery. Given the patients hx and examination today, I believe she would benefit from physical therapy. Pt agrees and would like to proceed with physical therapy, voltaren gel, and rest.    I made the decision to obtain old records of the patient including previous notes and imaging. I independently reviewed and interpreted lab results today as well as prior imaging.  Reviewed with patient in detail.    1. Diclofenac gel PRN over painful area  2. Ambulatory referral to physical therapy for patellofemoral strengthening and conditioning. [facility]  3. Ice compress to the affected area 2-3x a day for 15-20 minutes as needed for pain management.  4. Walker  order placed for ambulation to ensure heel to toe gait  The mobility limitation cannot be sufficiently resolved by the use of a cane. Patient's functional mobility deficit can be sufficiently resolved with the use of a Rolling Walker. Patient's mobility limitation significantly impairs their ability to participate in one of more activities of daily living. The use of a RW will significantly improve the patient's ability to participate in MRADLS and the patient will use it on regular basis in the home.   5. RTC to see Sharee Torres PA-C in 6 weeks for follow-up.      All of the patient's questions were answered and the patient will contact us if they have any questions or concerns in the interim.      Patient questionnaires may have been collected.

## 2024-08-20 ENCOUNTER — CLINICAL SUPPORT (OUTPATIENT)
Dept: REHABILITATION | Facility: HOSPITAL | Age: 64
End: 2024-08-20
Payer: COMMERCIAL

## 2024-08-20 DIAGNOSIS — S76.311A HAMSTRING STRAIN, RIGHT, INITIAL ENCOUNTER: ICD-10-CM

## 2024-08-20 PROCEDURE — 97110 THERAPEUTIC EXERCISES: CPT

## 2024-08-20 PROCEDURE — 97161 PT EVAL LOW COMPLEX 20 MIN: CPT

## 2024-08-27 ENCOUNTER — CLINICAL SUPPORT (OUTPATIENT)
Dept: REHABILITATION | Facility: HOSPITAL | Age: 64
End: 2024-08-27
Payer: COMMERCIAL

## 2024-08-27 DIAGNOSIS — S76.311A HAMSTRING STRAIN, RIGHT, INITIAL ENCOUNTER: Primary | ICD-10-CM

## 2024-08-27 PROCEDURE — 97530 THERAPEUTIC ACTIVITIES: CPT | Mod: CQ

## 2024-08-27 PROCEDURE — 97110 THERAPEUTIC EXERCISES: CPT | Mod: CQ

## 2024-08-27 NOTE — PROGRESS NOTES
[facility]   Physical Therapy Treatment Note      Name: Jyoti Blue  Clinic Number: [id number]    Therapy Diagnosis:   Encounter Diagnosis   Name Primary?    Hamstring strain, right, initial encounter Yes     Physician: Sharee Torres PA-C    Visit Date: 8/27/2024    Physician Orders: PT Eval and Treat   Medical Diagnosis from Referral: Hamstring strain, right, initial encounter [S76.311A]   Evaluation Date: 8/20/2024  Authorization Period Expiration: 12/31/2024  Plan of Care Expiration: 10/20/2024  Progress Note Due: 9/20/2024  Date of Surgery: N/a  Visit # / Visits authorized: 1/1;1/20   FOTO: 1/ 3     Precautions: Standard      PTA Visit #: 1/5     Time In: 200 PM   Time Out: 253 PM   Total Billable Time: 53 minutes    Subjective     Pt reports: feeling much better since last treatment session.  She was compliant with home exercise program.  Response to previous treatment: initial eval   Functional change: none     Pain: 3/10  Location: right hamstring strain     Objective      Objective Measures updated at progress report unless specified.     Treatment     Jyoti received the treatments listed below:      therapeutic exercises to develop strength, ROM, flexibility, posture, and core stabilization for 43 minutes including:    Scifit 5 minutes   Standing soleus stretch 3 x 30 second hold   Supine hamstring digs 2 x 10 3 second hold each   Supine bridges with heel digs 2 x 10   Sidelying hip abduction 2 x 10 each   Sidelying clams 2 x 10 each   Prone right hamstring curls 2 x 10   Prone right bent knee hip extension 2 x 10     therapeutic activities to improve functional performance for 10  minutes, including:    Standing bilateral hip extension 2 x 10 each   Standing bilateral hip abduction 2 x 10 each     Patient Education and Home Exercises       Education provided:   - HEP     Written Home Exercises Provided: yes. Exercises were reviewed and Jyoti was able to  demonstrate them prior to the end of the session.  Jyoti demonstrated good  understanding of the education provided. See EMR under Patient Instructions for exercises provided during therapy sessions.    Assessment     Patient was able to complete full therapeutic exercise with minimal reports of discomfort throughout treatment. Patient with most discomfort with prone hip extension with knee flexion, however, patient able to complete full reps. Patient with some weakness throughout lateral hip with sidelying hip abduction, however, with rest breaks throughout reps patient able to complete properly.    Jyoti Is progressing well towards her goals.   Pt prognosis is Good.     Pt will continue to benefit from skilled outpatient physical therapy to address the deficits listed in the problem list box on initial evaluation, provide pt/family education and to maximize pt's level of independence in the home and community environment.     Pt's spiritual, cultural and educational needs considered and pt agreeable to plan of care and goals.     Anticipated barriers to physical therapy: chronicity of pain     Goals:     Short Term Goals (4 Weeks):   1. Pt will be compliant with HEP to supplement PT in restoring pain free function.  2. Pt will improve impaired LE MMTs by 1/2 grade  to improve strength for functional tasks   Long Term Goals (8 Weeks):  1. Pt will improve FOTO score to </= 45% limited to decrease perceived limitation with mobility  2. Pt will improve impaired LE MMTs by 1 grade to improve strength for functional tasks.  3. Pt will ambulate with LRD to improve ADL performance and QOL.  4. Pt will report pain </= 3/10 in R Hamstring to improve QOL and ADL performance    Plan     Continue with Physical Therapist Plan of Care.     PT/PTA met face to face to discuss pt's treatment plan and progress towards established goals. Pt will be seen by a physical therapist minimally every 6th visit or every 30 days.    Johnathan  Gamaliel, PTA

## 2024-09-03 ENCOUNTER — CLINICAL SUPPORT (OUTPATIENT)
Dept: REHABILITATION | Facility: HOSPITAL | Age: 64
End: 2024-09-03
Payer: COMMERCIAL

## 2024-09-03 ENCOUNTER — LAB VISIT (OUTPATIENT)
Dept: LAB | Facility: HOSPITAL | Age: 64
End: 2024-09-03
Attending: FAMILY MEDICINE
Payer: COMMERCIAL

## 2024-09-03 DIAGNOSIS — E78.5 HYPERLIPIDEMIA, UNSPECIFIED HYPERLIPIDEMIA TYPE: ICD-10-CM

## 2024-09-03 DIAGNOSIS — Z00.00 ANNUAL PHYSICAL EXAM: ICD-10-CM

## 2024-09-03 DIAGNOSIS — I10 PRIMARY HYPERTENSION: Chronic | ICD-10-CM

## 2024-09-03 DIAGNOSIS — S76.311A HAMSTRING STRAIN, RIGHT, INITIAL ENCOUNTER: Primary | ICD-10-CM

## 2024-09-03 DIAGNOSIS — R73.03 PREDIABETES: ICD-10-CM

## 2024-09-03 LAB
ALBUMIN SERPL BCP-MCNC: 3.7 G/DL (ref 3.5–5.2)
ALP SERPL-CCNC: 97 U/L (ref 55–135)
ALT SERPL W/O P-5'-P-CCNC: 18 U/L (ref 10–44)
ANION GAP SERPL CALC-SCNC: 12 MMOL/L (ref 8–16)
AST SERPL-CCNC: 23 U/L (ref 10–40)
BASOPHILS # BLD AUTO: 0.04 K/UL (ref 0–0.2)
BASOPHILS NFR BLD: 1.2 % (ref 0–1.9)
BILIRUB SERPL-MCNC: 0.4 MG/DL (ref 0.1–1)
BUN SERPL-MCNC: 17 MG/DL (ref 8–23)
CALCIUM SERPL-MCNC: 9.9 MG/DL (ref 8.7–10.5)
CHLORIDE SERPL-SCNC: 104 MMOL/L (ref 95–110)
CHOLEST SERPL-MCNC: 182 MG/DL (ref 120–199)
CHOLEST/HDLC SERPL: 3.1 {RATIO} (ref 2–5)
CO2 SERPL-SCNC: 21 MMOL/L (ref 23–29)
CREAT SERPL-MCNC: 0.8 MG/DL (ref 0.5–1.4)
DIFFERENTIAL METHOD BLD: ABNORMAL
EOSINOPHIL # BLD AUTO: 0.1 K/UL (ref 0–0.5)
EOSINOPHIL NFR BLD: 2.1 % (ref 0–8)
ERYTHROCYTE [DISTWIDTH] IN BLOOD BY AUTOMATED COUNT: 15.5 % (ref 11.5–14.5)
EST. GFR  (NO RACE VARIABLE): >60 ML/MIN/1.73 M^2
ESTIMATED AVG GLUCOSE: 126 MG/DL (ref 68–131)
GLUCOSE SERPL-MCNC: 96 MG/DL (ref 70–110)
HBA1C MFR BLD: 6 % (ref 4–5.6)
HCT VFR BLD AUTO: 36.8 % (ref 37–48.5)
HDLC SERPL-MCNC: 59 MG/DL (ref 40–75)
HDLC SERPL: 32.4 % (ref 20–50)
HGB BLD-MCNC: 11.5 G/DL (ref 12–16)
IMM GRANULOCYTES # BLD AUTO: 0.01 K/UL (ref 0–0.04)
IMM GRANULOCYTES NFR BLD AUTO: 0.3 % (ref 0–0.5)
LDLC SERPL CALC-MCNC: 112.2 MG/DL (ref 63–159)
LYMPHOCYTES # BLD AUTO: 1 K/UL (ref 1–4.8)
LYMPHOCYTES NFR BLD: 30.5 % (ref 18–48)
MCH RBC QN AUTO: 27.5 PG (ref 27–31)
MCHC RBC AUTO-ENTMCNC: 31.3 G/DL (ref 32–36)
MCV RBC AUTO: 88 FL (ref 82–98)
MONOCYTES # BLD AUTO: 0.4 K/UL (ref 0.3–1)
MONOCYTES NFR BLD: 10.6 % (ref 4–15)
NEUTROPHILS # BLD AUTO: 1.8 K/UL (ref 1.8–7.7)
NEUTROPHILS NFR BLD: 55.3 % (ref 38–73)
NONHDLC SERPL-MCNC: 123 MG/DL
NRBC BLD-RTO: 0 /100 WBC
PLATELET # BLD AUTO: 292 K/UL (ref 150–450)
PMV BLD AUTO: 11.6 FL (ref 9.2–12.9)
POTASSIUM SERPL-SCNC: 3.8 MMOL/L (ref 3.5–5.1)
PROT SERPL-MCNC: 7.5 G/DL (ref 6–8.4)
RBC # BLD AUTO: 4.18 M/UL (ref 4–5.4)
SODIUM SERPL-SCNC: 137 MMOL/L (ref 136–145)
TRIGL SERPL-MCNC: 54 MG/DL (ref 30–150)
TSH SERPL DL<=0.005 MIU/L-ACNC: 1.99 UIU/ML (ref 0.4–4)
VIT B12 SERPL-MCNC: 612 PG/ML (ref 210–950)
WBC # BLD AUTO: 3.31 K/UL (ref 3.9–12.7)

## 2024-09-03 PROCEDURE — 36415 COLL VENOUS BLD VENIPUNCTURE: CPT | Performed by: FAMILY MEDICINE

## 2024-09-03 PROCEDURE — 97110 THERAPEUTIC EXERCISES: CPT | Mod: CQ

## 2024-09-03 PROCEDURE — 82607 VITAMIN B-12: CPT | Performed by: FAMILY MEDICINE

## 2024-09-03 PROCEDURE — 84443 ASSAY THYROID STIM HORMONE: CPT | Performed by: FAMILY MEDICINE

## 2024-09-03 PROCEDURE — 97530 THERAPEUTIC ACTIVITIES: CPT | Mod: CQ

## 2024-09-03 PROCEDURE — 80061 LIPID PANEL: CPT | Performed by: FAMILY MEDICINE

## 2024-09-03 PROCEDURE — 85025 COMPLETE CBC W/AUTO DIFF WBC: CPT | Performed by: FAMILY MEDICINE

## 2024-09-03 PROCEDURE — 83036 HEMOGLOBIN GLYCOSYLATED A1C: CPT | Performed by: FAMILY MEDICINE

## 2024-09-03 PROCEDURE — 80053 COMPREHEN METABOLIC PANEL: CPT | Performed by: FAMILY MEDICINE

## 2024-09-03 NOTE — PLAN OF CARE
[facility]   Physical Therapy Initial Evaluation      Name: Jyoti Blue  [facility] Number: [id number]    Therapy Diagnosis:   Encounter Diagnosis   Name Primary?    Hamstring strain, right, initial encounter         Physician: Sharee Torres PA-C    Physician Orders: PT Eval and Treat   Medical Diagnosis from Referral: Hamstring strain, right, initial encounter [S76.311A]   Evaluation Date: 8/20/2024  Authorization Period Expiration: 12/31/2024  Plan of Care Expiration: 10/20/2024  Progress Note Due: 9/20/2024  Date of Surgery: N/a  Visit # / Visits authorized: 1/ 1   FOTO: 1/ 3    Precautions: Standard     Time In: 1:00  Time Out: 2:00  Total Billable Time: 60 minutes    Subjective     Date of onset: 4 weeks ago    History of current condition - Carloswilfred reports: Patient presents to clinic with right knee pain x 3 days. She reports waking up with pain on 8/17/24 with severe pain in the posterior aspect of her knee. Denies any injury but she does report climbing 3 flights of stairs the day before when an elevator was broken. Pain is 9/10 with walking and weight bearing. She has been using an umbrella as a cane to assist her with ambulation. Pain at rest is minimal. She has been taking Advil and Mobic as needed for pain. Denies any effusions, mechanical symptoms, and instability. Pain is located along the medial hamstring. She is ambulating with a painful limp.       Falls: None    Imaging: MRI studies    Prior Therapy: No  Social History:  lives with their daughter  Occupation: [employer] Employee  Prior Level of Function: INDP  Current Level of Function: INDP    Pain:  Current 5/10, worst 9/10, best 5/10   Location: right hamstring  Description: Dull, Aching, Sharp, Dull with activity, sharp with certain movements, Variable  Aggravating Factors: Night Time, Morning, Extension, Flexing, and Getting out of bed/chair  Easing Factors: hot bath and rest    Patients goals: To improve my  pain     Medical History:   Past Medical History:   Diagnosis Date    Allergy     Anemia     Hyperlipidemia        Surgical History:   Jyoti Blue  has a past surgical history that includes ovary removed; Dilation and curettage of uterus; Breast surgery; Cataract extraction; Cataract extraction w/  intraocular lens implant (Right, 11/22/2021); Eye surgery; Cataract extraction w/  intraocular lens implant (Left, 2/7/2022); and Colonoscopy (N/A, 6/23/2023).    Medications:   Jyoti has a current medication list which includes the following prescription(s): ascorbic acid (vitamin c), aspirin, atorvastatin, azelastine, chlorthalidone, cholecalciferol (vitamin d3), cyanocobalamin, cyclobenzaprine, diclofenac sodium, dilt-xr, meloxicam, metformin, naproxen, paxlovid, and polytussin dm(dexbromphenirmn), and the following Facility-Administered Medications: electrolyte-s (ph 7.4), fentanyl, hydromorphone (pf), lactated ringers, lactated ringers, lidocaine (pf) 10 mg/ml (1%), lidocaine (pf) 10 mg/ml (1%), and oxycodone.    Allergies:   Review of patient's allergies indicates:  No Known Allergies     Objective       Range of Motion: Knee   Left Right   Flexion: 120 120   Extension 0 0     Strength: Knee   Left Right   Quadriceps 4/5 4/5   Hamstrings 4/5 4/5       Strength: Hip    Left Right   Iliopsoas 4/5 4/5   PGM 4/5 4/5   IR 4/5 4/5   ER 4/5 4/5   Ext 4/5 4/5       Special Tests: Knee   Left Right   Ant. Drawer - instability and - pain - instability and - pain   Post. Drawer - instability and - pain - instability and - pain   Lachman's - pain and - instability - instability and - pain   Valgus at 0 Deg - instability and - pain - instability and - pain   Valgus at 30 Deg - instability and - pain - instability and - pain   Varus at 0 Deg - instability and - pain - instability and - pain   Varus at 30 Deg - instability and - pain - instability and - pain   Aviva's - pain and - instability - instability and - pain    Apley's Compression - pain and - instability - instability and - pain   Apley's Distraction - instability and - pain - instability and - pain     Joint Mobility: Normal  Palpation: TTP on R Medial Hamstring  Sensation: intact to light touch    Edema:  Left: absent  Right: absent    Gait: ambulated with none. Antalgic  Level of Assistance: independent  Patient displays decreased step length, decreased weight shift, and antalgic gait.   Balance: Maintains SLS >5  seconds with good balance strategies.    Intake Outcome Measure for FOTO None Survey    Therapist reviewed FOTO scores for Jyoti Blue on 8/20/2024.   FOTO report - see Media section or FOTO account episode details.    Intake Score: 78%         Treatment     Total Treatment time (time-based codes) separate from Evaluation: 10 minutes     Jyoti received the treatments listed below:      therapeutic exercises to develop strength, endurance, ROM, flexibility, posture, and core stabilization for 10 minutes including:  LAQ  Standing TKE  Supine Quad Stretch  Seated Hamstring Stretch  Clamshells    Patient Education and Home Exercises     Education provided:   - HEP    Written Home Exercises Provided: yes. Exercises were reviewed and Jyoti was able to demonstrate them prior to the end of the session.  Jyoti demonstrated good  understanding of the education provided. See EMR under Patient Instructions for exercises provided during therapy sessions.    Assessment     Jyoti is a 64 y.o. female referred to outpatient Physical Therapy with a medical diagnosis of Hamstring strain, right, initial encounter [S76.213A]  . Patient presents with decreased LE strength, decreased LE ROM, decreased functional mobility, abnormal gait, and increased pain. Pt was educated on compliance with HEP and role of PT.    Patient prognosis is Excellent.   Patient will benefit from skilled outpatient Physical Therapy to address the deficits stated above and in the chart  below, provide patient /family education, and to maximize patientt's level of independence.     Plan of care discussed with patient: Yes  Patient's spiritual, cultural and educational needs considered and patient is agreeable to the plan of care and goals as stated below:     Anticipated Barriers for therapy: chronicity of pain    Medical Necessity is demonstrated by the following  History  Co-morbidities and personal factors that may impact the plan of care [x] LOW: no personal factors / co-morbidities  [] MODERATE: 1-2 personal factors / co-morbidities  [] HIGH: 3+ personal factors / co-morbidities    Moderate / High Support Documentation:   Co-morbidities affecting plan of care:     Personal Factors:   no deficits     Examination  Body Structures and Functions, activity limitations and participation restrictions that may impact the plan of care [x] LOW: addressing 1-2 elements  [] MODERATE: 3+ elements  [] HIGH: 4+ elements (please support below)    Moderate / High Support Documentation:      Clinical Presentation [x] LOW: stable  [] MODERATE: Evolving  [] HIGH: Unstable     Decision Making/ Complexity Score: low       Goals:  Goals:  Short Term Goals (4 Weeks):   1. Pt will be compliant with HEP to supplement PT in restoring pain free function.  2. Pt will improve impaired LE MMTs by 1/2 grade  to improve strength for functional tasks   Long Term Goals (8 Weeks):  1. Pt will improve FOTO score to </= 45% limited to decrease perceived limitation with mobility  2. Pt will improve impaired LE MMTs by 1 grade to improve strength for functional tasks.  3. Pt will ambulate with LRD to improve ADL performance and QOL.  4. Pt will report pain </= 3/10 in R Hamstring to improve QOL and ADL performance    Plan     Plan of care Certification: 8/20/2024 to 10/20/2024.    Outpatient Physical Therapy 1-2 times weekly for 8 weeks to include the following interventions: Aquatic Therapy, Cervical/Lumbar Traction, Gait Training,  Manual Therapy, Moist Heat/ Ice, Neuromuscular Re-ed, Patient Education, Self Care, Therapeutic Activities, Therapeutic Exercise, and Ultrasound.     Dandre Cardoso PT        Physician's Signature: _________________________________________ Date: ________________

## 2024-09-03 NOTE — PROGRESS NOTES
"[facility]   Physical Therapy Treatment Note      Name: Jyoti Lott Mirza  Clinic Number: [id number]    Therapy Diagnosis:   Encounter Diagnosis   Name Primary?    Hamstring strain, right, initial encounter Yes     Physician: Sharee Torres PA-C    Visit Date: 9/3/2024    Physician Orders: PT Eval and Treat   Medical Diagnosis from Referral: Hamstring strain, right, initial encounter [S76.311A]   Evaluation Date: 8/20/2024  Authorization Period Expiration: 12/31/2024  Plan of Care Expiration: 10/20/2024  Progress Note Due: 9/20/2024  Date of Surgery: N/a  Visit # / Visits authorized: 1/1; 3/20   FOTO: 1/ 3     Precautions: Standard      PTA Visit #: 2/5     Time In: 220 PM   Time Out: 305 PM   Total Billable Time: 45 minutes    Subjective     Pt reports: continues to feeling a little better, however, patient still with a lot of "stiffness" throughout lower extremity.  She was compliant with home exercise program.  Response to previous treatment: initial eval   Functional change: none     Pain: 2/10  Location: right hamstring strain     Objective      Objective Measures updated at progress report unless specified.     Treatment     Jyoti received the treatments listed below:      therapeutic exercises to develop strength, ROM, flexibility, posture, and core stabilization for 30 minutes including:    Scifit 5 minutes   Standing soleus stretch 3 x 30 second hold NP   Supine hamstring digs 2 x 10 3 second hold each   Supine bridges with heel digs 2 x 10   Sidelying hip abduction 2 x 10 each   Sidelying clams 2 x 10 each   Prone right hamstring curls 2 x 10 NP  Prone right bent knee hip extension 2 x 10 NP     therapeutic activities to improve functional performance for 10  minutes, including:    Standing bilateral hip extension 2 x 10 each   Standing bilateral hip abduction 2 x 10 each     Patient Education and Home Exercises       Education provided:   - HEP     Written Home " Exercises Provided: yes. Exercises were reviewed and Jyoti was able to demonstrate them prior to the end of the session.  Jyoti demonstrated good  understanding of the education provided. See EMR under Patient Instructions for exercises provided during therapy sessions.    Assessment     Patient with increased pain throughout left lower extremity today after performing standing right hip extension. Patient received manual knee flexion and extension to help decrease discomfort, patient with some relief. Patient then performed supine hamstring digs, however, patient with discomfort after task again. Patient received seated manual distraction to help decreased discomfort. Patient again with decreased pain. Physical Therapist Assistant held off on other hamstring activating therapeutic exercise due to patient with increased discomfort with therapeutic exercise. Patient was able to complete mat based hip strengthening therapeutic exercise without increased irritation. Patient informed Physical Therapist Assistant she felt better upon leaving Physical Therapy treatment.      Jyoti Is progressing well towards her goals.   Pt prognosis is Good.     Pt will continue to benefit from skilled outpatient physical therapy to address the deficits listed in the problem list box on initial evaluation, provide pt/family education and to maximize pt's level of independence in the home and community environment.     Pt's spiritual, cultural and educational needs considered and pt agreeable to plan of care and goals.     Anticipated barriers to physical therapy: chronicity of pain     Goals:     Short Term Goals (4 Weeks):   1. Pt will be compliant with HEP to supplement PT in restoring pain free function.  2. Pt will improve impaired LE MMTs by 1/2 grade  to improve strength for functional tasks   Long Term Goals (8 Weeks):  1. Pt will improve FOTO score to </= 45% limited to decrease perceived limitation with mobility  2. Pt will  improve impaired LE MMTs by 1 grade to improve strength for functional tasks.  3. Pt will ambulate with LRD to improve ADL performance and QOL.  4. Pt will report pain </= 3/10 in R Hamstring to improve QOL and ADL performance    Plan     Continue with Physical Therapist Plan of Care.     PT/PTA met face to face to discuss pt's treatment plan and progress towards established goals. Pt will be seen by a physical therapist minimally every 6th visit or every 30 days.    Johnathan Alston PTA

## 2024-09-03 NOTE — PROGRESS NOTES
[facility]   Physical Therapy Initial Evaluation      Name: Jyoti Blue  [facility] Number: [id number]    Therapy Diagnosis:   Encounter Diagnosis   Name Primary?    Hamstring strain, right, initial encounter         Physician: Sharee Torres PA-C    Physician Orders: PT Eval and Treat   Medical Diagnosis from Referral: Hamstring strain, right, initial encounter [S76.311A]   Evaluation Date: 8/20/2024  Authorization Period Expiration: 12/31/2024  Plan of Care Expiration: 10/20/2024  Progress Note Due: 9/20/2024  Date of Surgery: N/a  Visit # / Visits authorized: 1/ 1   FOTO: 1/ 3    Precautions: Standard     Time In: 1:00  Time Out: 2:00  Total Billable Time: 60 minutes    Subjective     Date of onset: 4 weeks ago    History of current condition - Carloswilfred reports: Patient presents to clinic with right knee pain x 3 days. She reports waking up with pain on 8/17/24 with severe pain in the posterior aspect of her knee. Denies any injury but she does report climbing 3 flights of stairs the day before when an elevator was broken. Pain is 9/10 with walking and weight bearing. She has been using an umbrella as a cane to assist her with ambulation. Pain at rest is minimal. She has been taking Advil and Mobic as needed for pain. Denies any effusions, mechanical symptoms, and instability. Pain is located along the medial hamstring. She is ambulating with a painful limp.       Falls: None    Imaging: MRI studies    Prior Therapy: No  Social History:  lives with their daughter  Occupation: [employer] Employee  Prior Level of Function: INDP  Current Level of Function: INDP    Pain:  Current 5/10, worst 9/10, best 5/10   Location: right hamstring  Description: Dull, Aching, Sharp, Dull with activity, sharp with certain movements, Variable  Aggravating Factors: Night Time, Morning, Extension, Flexing, and Getting out of bed/chair  Easing Factors: hot bath and rest    Patients goals: To improve my  pain     Medical History:   Past Medical History:   Diagnosis Date    Allergy     Anemia     Hyperlipidemia        Surgical History:   Jyoti Blue  has a past surgical history that includes ovary removed; Dilation and curettage of uterus; Breast surgery; Cataract extraction; Cataract extraction w/  intraocular lens implant (Right, 11/22/2021); Eye surgery; Cataract extraction w/  intraocular lens implant (Left, 2/7/2022); and Colonoscopy (N/A, 6/23/2023).    Medications:   Jyoti has a current medication list which includes the following prescription(s): ascorbic acid (vitamin c), aspirin, atorvastatin, azelastine, chlorthalidone, cholecalciferol (vitamin d3), cyanocobalamin, cyclobenzaprine, diclofenac sodium, dilt-xr, meloxicam, metformin, naproxen, paxlovid, and polytussin dm(dexbromphenirmn), and the following Facility-Administered Medications: electrolyte-s (ph 7.4), fentanyl, hydromorphone (pf), lactated ringers, lactated ringers, lidocaine (pf) 10 mg/ml (1%), lidocaine (pf) 10 mg/ml (1%), and oxycodone.    Allergies:   Review of patient's allergies indicates:  No Known Allergies     Objective       Range of Motion: Knee   Left Right   Flexion: 120 120   Extension 0 0     Strength: Knee   Left Right   Quadriceps 4/5 4/5   Hamstrings 4/5 4/5       Strength: Hip    Left Right   Iliopsoas 4/5 4/5   PGM 4/5 4/5   IR 4/5 4/5   ER 4/5 4/5   Ext 4/5 4/5       Special Tests: Knee   Left Right   Ant. Drawer - instability and - pain - instability and - pain   Post. Drawer - instability and - pain - instability and - pain   Lachman's - pain and - instability - instability and - pain   Valgus at 0 Deg - instability and - pain - instability and - pain   Valgus at 30 Deg - instability and - pain - instability and - pain   Varus at 0 Deg - instability and - pain - instability and - pain   Varus at 30 Deg - instability and - pain - instability and - pain   Aviva's - pain and - instability - instability and - pain    Apley's Compression - pain and - instability - instability and - pain   Apley's Distraction - instability and - pain - instability and - pain     Joint Mobility: Normal  Palpation: TTP on R Medial Hamstring  Sensation: intact to light touch    Edema:  Left: absent  Right: absent    Gait: ambulated with none. Antalgic  Level of Assistance: independent  Patient displays decreased step length, decreased weight shift, and antalgic gait.   Balance: Maintains SLS >5  seconds with good balance strategies.    Intake Outcome Measure for FOTO None Survey    Therapist reviewed FOTO scores for Jyoti Blue on 8/20/2024.   FOTO report - see Media section or FOTO account episode details.    Intake Score: 78%         Treatment     Total Treatment time (time-based codes) separate from Evaluation: 10 minutes     Jyoti received the treatments listed below:      therapeutic exercises to develop strength, endurance, ROM, flexibility, posture, and core stabilization for 10 minutes including:  LAQ  Standing TKE  Supine Quad Stretch  Seated Hamstring Stretch  Clamshells    Patient Education and Home Exercises     Education provided:   - HEP    Written Home Exercises Provided: yes. Exercises were reviewed and Jyoti was able to demonstrate them prior to the end of the session.  Jyoti demonstrated good  understanding of the education provided. See EMR under Patient Instructions for exercises provided during therapy sessions.    Assessment     Jyoti is a 64 y.o. female referred to outpatient Physical Therapy with a medical diagnosis of Hamstring strain, right, initial encounter [S76.350A]  . Patient presents with decreased LE strength, decreased LE ROM, decreased functional mobility, abnormal gait, and increased pain. Pt was educated on compliance with HEP and role of PT.    Patient prognosis is Excellent.   Patient will benefit from skilled outpatient Physical Therapy to address the deficits stated above and in the chart  below, provide patient /family education, and to maximize patientt's level of independence.     Plan of care discussed with patient: Yes  Patient's spiritual, cultural and educational needs considered and patient is agreeable to the plan of care and goals as stated below:     Anticipated Barriers for therapy: chronicity of pain    Medical Necessity is demonstrated by the following  History  Co-morbidities and personal factors that may impact the plan of care [x] LOW: no personal factors / co-morbidities  [] MODERATE: 1-2 personal factors / co-morbidities  [] HIGH: 3+ personal factors / co-morbidities    Moderate / High Support Documentation:   Co-morbidities affecting plan of care:     Personal Factors:   no deficits     Examination  Body Structures and Functions, activity limitations and participation restrictions that may impact the plan of care [x] LOW: addressing 1-2 elements  [] MODERATE: 3+ elements  [] HIGH: 4+ elements (please support below)    Moderate / High Support Documentation:      Clinical Presentation [x] LOW: stable  [] MODERATE: Evolving  [] HIGH: Unstable     Decision Making/ Complexity Score: low       Goals:  Goals:  Short Term Goals (4 Weeks):   1. Pt will be compliant with HEP to supplement PT in restoring pain free function.  2. Pt will improve impaired LE MMTs by 1/2 grade  to improve strength for functional tasks   Long Term Goals (8 Weeks):  1. Pt will improve FOTO score to </= 45% limited to decrease perceived limitation with mobility  2. Pt will improve impaired LE MMTs by 1 grade to improve strength for functional tasks.  3. Pt will ambulate with LRD to improve ADL performance and QOL.  4. Pt will report pain </= 3/10 in R Hamstring to improve QOL and ADL performance    Plan     Plan of care Certification: 8/20/2024 to 10/20/2024.    Outpatient Physical Therapy 1-2 times weekly for 8 weeks to include the following interventions: Aquatic Therapy, Cervical/Lumbar Traction, Gait Training,  Manual Therapy, Moist Heat/ Ice, Neuromuscular Re-ed, Patient Education, Self Care, Therapeutic Activities, Therapeutic Exercise, and Ultrasound.     Dandre Cardoso PT        Physician's Signature: _________________________________________ Date: ________________

## 2024-09-04 RX ORDER — CHLORTHALIDONE 25 MG/1
25 TABLET ORAL
Qty: 30 TABLET | Refills: 6 | Status: SHIPPED | OUTPATIENT
Start: 2024-09-04

## 2024-09-09 ENCOUNTER — PATIENT OUTREACH (OUTPATIENT)
Dept: ADMINISTRATIVE | Facility: HOSPITAL | Age: 64
End: 2024-09-09
Payer: COMMERCIAL

## 2024-09-09 ENCOUNTER — OFFICE VISIT (OUTPATIENT)
Dept: PRIMARY CARE CLINIC | Facility: CLINIC | Age: 64
End: 2024-09-09
Payer: COMMERCIAL

## 2024-09-09 VITALS
RESPIRATION RATE: 14 BRPM | HEIGHT: 61 IN | OXYGEN SATURATION: 98 % | WEIGHT: 195.13 LBS | DIASTOLIC BLOOD PRESSURE: 76 MMHG | BODY MASS INDEX: 36.84 KG/M2 | SYSTOLIC BLOOD PRESSURE: 132 MMHG | HEART RATE: 72 BPM

## 2024-09-09 DIAGNOSIS — E66.01 CLASS 2 SEVERE OBESITY DUE TO EXCESS CALORIES WITH SERIOUS COMORBIDITY AND BODY MASS INDEX (BMI) OF 36.0 TO 36.9 IN ADULT: ICD-10-CM

## 2024-09-09 DIAGNOSIS — Z00.00 ANNUAL PHYSICAL EXAM: Primary | ICD-10-CM

## 2024-09-09 DIAGNOSIS — R73.03 PREDIABETES: ICD-10-CM

## 2024-09-09 DIAGNOSIS — I65.23 BILATERAL CAROTID ARTERY STENOSIS: ICD-10-CM

## 2024-09-09 DIAGNOSIS — E78.5 HYPERLIPIDEMIA, UNSPECIFIED HYPERLIPIDEMIA TYPE: ICD-10-CM

## 2024-09-09 DIAGNOSIS — I10 PRIMARY HYPERTENSION: ICD-10-CM

## 2024-09-09 DIAGNOSIS — Z86.010 HISTORY OF COLON POLYPS: ICD-10-CM

## 2024-09-09 DIAGNOSIS — I77.9 BILATERAL CAROTID ARTERY DISEASE, UNSPECIFIED TYPE: ICD-10-CM

## 2024-09-09 DIAGNOSIS — M17.11 PRIMARY OSTEOARTHRITIS OF RIGHT KNEE: ICD-10-CM

## 2024-09-09 DIAGNOSIS — E55.9 VITAMIN D DEFICIENCY: ICD-10-CM

## 2024-09-09 PROCEDURE — 3075F SYST BP GE 130 - 139MM HG: CPT | Mod: CPTII,S$GLB,, | Performed by: FAMILY MEDICINE

## 2024-09-09 PROCEDURE — 3044F HG A1C LEVEL LT 7.0%: CPT | Mod: CPTII,S$GLB,, | Performed by: FAMILY MEDICINE

## 2024-09-09 PROCEDURE — 96372 THER/PROPH/DIAG INJ SC/IM: CPT | Mod: S$GLB,,, | Performed by: FAMILY MEDICINE

## 2024-09-09 PROCEDURE — 3078F DIAST BP <80 MM HG: CPT | Mod: CPTII,S$GLB,, | Performed by: FAMILY MEDICINE

## 2024-09-09 PROCEDURE — 99396 PREV VISIT EST AGE 40-64: CPT | Mod: 25,S$GLB,, | Performed by: FAMILY MEDICINE

## 2024-09-09 PROCEDURE — 99999 PR PBB SHADOW E&M-EST. PATIENT-LVL V: CPT | Mod: PBBFAC,,, | Performed by: FAMILY MEDICINE

## 2024-09-09 PROCEDURE — 3008F BODY MASS INDEX DOCD: CPT | Mod: CPTII,S$GLB,, | Performed by: FAMILY MEDICINE

## 2024-09-09 PROCEDURE — 1159F MED LIST DOCD IN RCRD: CPT | Mod: CPTII,S$GLB,, | Performed by: FAMILY MEDICINE

## 2024-09-09 PROCEDURE — 1160F RVW MEDS BY RX/DR IN RCRD: CPT | Mod: CPTII,S$GLB,, | Performed by: FAMILY MEDICINE

## 2024-09-09 RX ORDER — MELOXICAM 7.5 MG/1
TABLET ORAL
Qty: 60 TABLET | Refills: 0 | Status: SHIPPED | OUTPATIENT
Start: 2024-09-09

## 2024-09-09 RX ORDER — METFORMIN HYDROCHLORIDE 500 MG/1
500 TABLET, EXTENDED RELEASE ORAL 2 TIMES DAILY WITH MEALS
Qty: 180 TABLET | Refills: 1 | Status: SHIPPED | OUTPATIENT
Start: 2024-09-09

## 2024-09-09 RX ORDER — ASPIRIN 81 MG/1
81 TABLET ORAL DAILY
Qty: 90 TABLET | Refills: 1 | Status: SHIPPED | OUTPATIENT
Start: 2024-09-09 | End: 2025-09-09

## 2024-09-09 RX ORDER — KETOROLAC TROMETHAMINE 30 MG/ML
30 INJECTION, SOLUTION INTRAMUSCULAR; INTRAVENOUS
Status: COMPLETED | OUTPATIENT
Start: 2024-09-09 | End: 2024-09-09

## 2024-09-09 RX ADMIN — KETOROLAC TROMETHAMINE 30 MG: 30 INJECTION, SOLUTION INTRAMUSCULAR; INTRAVENOUS at 10:09

## 2024-09-09 NOTE — PATIENT INSTRUCTIONS
Increase metformin to 500 mg twice daily    Look into Wegovvy or Zepbound    Shingles, flu, rsv and covid at pharmacy

## 2024-09-09 NOTE — PROGRESS NOTES
"Subjective:       Patient ID: Jyoti Blue is a 64 y.o. female.    Chief Complaint: Annual Exam    HPI  65 y/o female with HLPD, OA of R knee, CLBP, Vit D Def, Prediabetes, Diverticulosis, hx of Colon polyps, PVC's,HTN, hx of Covid 1/2021 is here for annual exam.    She has been having R hamstring pain for over 2 weeks, no fall or injury, she did take the stairs which she usually does not do, the pain is constant "like a tooth ache", worse with certain motions like driving and walking, pain is 7-10/10, she was seen by ortho 8/19 and had Xray showed DJD, she started PT but she feels it has made it worse, plans to follow up, she is using topical Diclofenac and Salonpas, she tried Tylenol with minimal relief, she tried ice and heat as well.      She is feeling good otherwise, she denies f/n/v/d/constipation/cp/sob/urinary sx. She walks a lot at work, no dedicated exercise. Weight up 9 pounds.  She is trying to eat healthy. She is sleeping well.  Mood good.      Hx of Covid-19 1/2021  PCV's/Palpitations/HTN/HLPD/Mild Carotid disease: following with Cardiology, Diltiazem 180 mg daily,repeat Carotid US in 2024; asa 81 mg daily, Pravastatin 80 mg daily  ENT Dr. Terrell, gets cerumen impaction and has to get her ears cleaned every 6 months  R knee pain on and off for 10 years: pain is achy and comes and goes, she denies knee swelling, she has been to ortho and had multiple injections, she repots taking Naproxen 1-2 days a month   GYN: followed by Dr. Quita Bess, pelvic utd, mmg DIS utd will request report   Prediabetes: metformin  mg daily does not want to increase; A1c 6.0 9/2024  Vitamin D def: Vitamin D3 2,000 IU daily gummies  Colonoscopy 6/2023 diverticulosis, hemorrhoids, colon polyps, repeat 7 years  Eye exam utd  Dental utd    Labs 9/3/24 reviewed    Discussed Wegovvy and Zepbound, she would like to not do this at this time, no personal or family history of pancreatitis or medullary thyroid " "cancer    Review of Systems    Objective:      /76 (BP Location: Right arm, Patient Position: Sitting, BP Method: Large (Manual))   Pulse 72   Resp 14   Ht 5' 1" (1.549 m)   Wt 88.5 kg (195 lb 1.7 oz)   SpO2 98%   BMI 36.87 kg/m²   Physical Exam  Vitals and nursing note reviewed.   Constitutional:       Appearance: She is well-developed.   HENT:      Head: Normocephalic and atraumatic.      Mouth/Throat:      Pharynx: No oropharyngeal exudate or posterior oropharyngeal erythema.   Neck:      Thyroid: No thyromegaly.   Cardiovascular:      Rate and Rhythm: Normal rate and regular rhythm.      Heart sounds: Normal heart sounds.   Pulmonary:      Effort: Pulmonary effort is normal. No respiratory distress.      Breath sounds: Normal breath sounds.   Abdominal:      General: Bowel sounds are normal. There is no distension.      Palpations: Abdomen is soft. There is no mass.      Tenderness: There is no abdominal tenderness.   Musculoskeletal:      Cervical back: Normal range of motion and neck supple.      Right lower leg: No edema.      Left lower leg: No edema.   Lymphadenopathy:      Cervical: No cervical adenopathy.   Skin:     General: Skin is warm and dry.   Neurological:      Mental Status: She is alert.         Assessment:       1. Annual physical exam    2. Bilateral carotid artery stenosis    3. Prediabetes    4. Hyperlipidemia, unspecified hyperlipidemia type    5. Primary hypertension    6. Primary osteoarthritis of right knee    7. History of colon polyps    8. Class 2 severe obesity due to excess calories with serious comorbidity and body mass index (BMI) of 36.0 to 36.9 in adult    9. Vitamin D deficiency    10. Bilateral carotid artery disease, unspecified type        Plan:   Jyoti was seen today for annual exam.    Diagnoses and all orders for this visit:    Annual physical exam    Bilateral carotid artery stenosis  -     aspirin (ECOTRIN) 81 MG EC tablet; Take 1 tablet (81 mg total) by " mouth once daily.    Prediabetes  -     metFORMIN (GLUCOPHAGE-XR) 500 MG ER 24hr tablet; Take 1 tablet (500 mg total) by mouth 2 (two) times daily with meals.  -     Comprehensive Metabolic Panel; Future  -     Hemoglobin A1C; Future    Hyperlipidemia, unspecified hyperlipidemia type  -     Comprehensive Metabolic Panel; Future  -     Ambulatory referral/consult to Optometry; Future    Primary hypertension  -     Comprehensive Metabolic Panel; Future  -     Ambulatory referral/consult to Optometry; Future    Primary osteoarthritis of right knee  -     meloxicam (MOBIC) 7.5 MG tablet; Take 1-2 tablets daily as needed  -     ketorolac injection 30 mg    History of colon polyps    Class 2 severe obesity due to excess calories with serious comorbidity and body mass index (BMI) of 36.0 to 36.9 in adult  -     Vitamin D; Future    Vitamin D deficiency  -     Vitamin D; Future    Bilateral carotid artery disease, unspecified type

## 2024-09-12 ENCOUNTER — TELEPHONE (OUTPATIENT)
Dept: SPORTS MEDICINE | Facility: HOSPITAL | Age: 64
End: 2024-09-12
Payer: COMMERCIAL

## 2024-09-12 NOTE — TELEPHONE ENCOUNTER
Called patient at 10am regarding her right knee pain. She states that she was doing better with the PT visits. However, after one PT session, she reported an increase in pain. She was worried that she might need an MRI.    She was seen by her PCP on 9/9/24 and received a right knee Toradol injection and was prescribed Mobic to take once daily. She is doing much better after starting the Mobic.     She has noticed that she does not need the walker as often anymore.    I explained that the MRI will not give us much information since we can see she has OA in her right knee from her radiographs, and her symptoms are more consistent with a hamstring strain.    Continue to use heat, stretch, attend formal PT, take Mobic, and follow up with me in a few weeks.

## 2024-09-18 ENCOUNTER — OFFICE VISIT (OUTPATIENT)
Dept: OPTOMETRY | Facility: CLINIC | Age: 64
End: 2024-09-18
Payer: COMMERCIAL

## 2024-09-18 DIAGNOSIS — H43.811 POSTERIOR VITREOUS DETACHMENT OF RIGHT EYE: ICD-10-CM

## 2024-09-18 DIAGNOSIS — Z96.1 PSEUDOPHAKIA OF BOTH EYES: ICD-10-CM

## 2024-09-18 PROCEDURE — 99999 PR PBB SHADOW E&M-EST. PATIENT-LVL III: CPT | Mod: PBBFAC,,, | Performed by: OPTOMETRIST

## 2024-09-18 PROCEDURE — 92014 COMPRE OPH EXAM EST PT 1/>: CPT | Mod: S$GLB,,, | Performed by: OPTOMETRIST

## 2024-09-18 PROCEDURE — 1159F MED LIST DOCD IN RCRD: CPT | Mod: CPTII,S$GLB,, | Performed by: OPTOMETRIST

## 2024-09-18 PROCEDURE — 3044F HG A1C LEVEL LT 7.0%: CPT | Mod: CPTII,S$GLB,, | Performed by: OPTOMETRIST

## 2024-09-19 ENCOUNTER — CLINICAL SUPPORT (OUTPATIENT)
Dept: REHABILITATION | Facility: HOSPITAL | Age: 64
End: 2024-09-19
Payer: COMMERCIAL

## 2024-09-19 DIAGNOSIS — S76.311A HAMSTRING STRAIN, RIGHT, INITIAL ENCOUNTER: Primary | ICD-10-CM

## 2024-09-19 PROCEDURE — 97530 THERAPEUTIC ACTIVITIES: CPT

## 2024-09-19 PROCEDURE — 97110 THERAPEUTIC EXERCISES: CPT

## 2024-09-19 NOTE — PROGRESS NOTES
"[facility]   Physical Therapy Treatment Note      Name: Jyoti Blue  Clinic Number: [id number]    Therapy Diagnosis:   No diagnosis found.    Physician: Sharee Torres PA-C    Visit Date: 9/19/2024    Physician Orders: PT Eval and Treat   Medical Diagnosis from Referral: Hamstring strain, right, initial encounter [S76.311A]   Evaluation Date: 8/20/2024  Authorization Period Expiration: 12/31/2024  Plan of Care Expiration: 10/20/2024  Progress Note Due: 9/20/2024  Date of Surgery: N/a  Visit # / Visits authorized: 1/1; 3/20   FOTO: 1/ 3     Precautions: Standard      PTA Visit #: 2/5     Time In: 220 PM   Time Out: 305 PM   Total Billable Time: 45 minutes    Subjective     Pt reports: continues to feeling a little better, however, patient still with a lot of "stiffness" throughout lower extremity.  She was compliant with home exercise program.  Response to previous treatment: initial eval   Functional change: none     Pain: 2/10  Location: right hamstring strain     Objective      Objective Measures updated at progress report unless specified.     Treatment     Jyoti received the treatments listed below:      therapeutic exercises to develop strength, ROM, flexibility, posture, and core stabilization for 30 minutes including:    Scifit 5 minutes   Standing soleus stretch 3 x 30 second hold NP   Supine hamstring digs 2 x 10 3 second hold each   Supine bridges with heel digs 2 x 10   Sidelying hip abduction 2 x 10 each   Sidelying clams 2 x 10 each   Prone right hamstring curls 2 x 10 NP  Prone right bent knee hip extension 2 x 10 NP     therapeutic activities to improve functional performance for 30  minutes, including:    Standing bilateral hip extension 2 x 10 each   Standing bilateral hip abduction 2 x 10 each     Patient Education and Home Exercises       Education provided:   - HEP     Written Home Exercises Provided: yes. Exercises were reviewed and Jyoti was able to " demonstrate them prior to the end of the session.  Jyoti demonstrated good  understanding of the education provided. See EMR under Patient Instructions for exercises provided during therapy sessions.    Assessment     Patient with increased pain throughout left lower extremity today after performing standing right hip extension. Patient received manual knee flexion and extension to help decrease discomfort, patient with some relief. Patient then performed supine hamstring digs, however, patient with discomfort after task again. Patient received seated manual distraction to help decreased discomfort. Patient again with decreased pain. Physical Therapist Assistant held off on other hamstring activating therapeutic exercise due to patient with increased discomfort with therapeutic exercise. Patient was able to complete mat based hip strengthening therapeutic exercise without increased irritation. Patient informed Physical Therapist Assistant she felt better upon leaving Physical Therapy treatment.      Jyoti Is progressing well towards her goals.   Pt prognosis is Good.     Pt will continue to benefit from skilled outpatient physical therapy to address the deficits listed in the problem list box on initial evaluation, provide pt/family education and to maximize pt's level of independence in the home and community environment.     Pt's spiritual, cultural and educational needs considered and pt agreeable to plan of care and goals.     Anticipated barriers to physical therapy: chronicity of pain     Goals:     Short Term Goals (4 Weeks):   1. Pt will be compliant with HEP to supplement PT in restoring pain free function.  2. Pt will improve impaired LE MMTs by 1/2 grade  to improve strength for functional tasks   Long Term Goals (8 Weeks):  1. Pt will improve FOTO score to </= 45% limited to decrease perceived limitation with mobility  2. Pt will improve impaired LE MMTs by 1 grade to improve strength for functional  tasks.  3. Pt will ambulate with LRD to improve ADL performance and QOL.  4. Pt will report pain </= 3/10 in R Hamstring to improve QOL and ADL performance    Plan     Continue with Physical Therapist Plan of Care.     PT/PTA met face to face to discuss pt's treatment plan and progress towards established goals. Pt will be seen by a physical therapist minimally every 6th visit or every 30 days.    Dandre Cardoso, PT        The patient is a 57y Female complaining of seizures.

## 2024-09-30 NOTE — PROGRESS NOTES
[facility]   Physical Therapy Treatment Note      Name: Jyoti Blue  Clinic Number: [id number]    Therapy Diagnosis:   Encounter Diagnosis   Name Primary?    Hamstring strain, right, initial encounter Yes     Physician: Sharee Torres PA-C    Visit Date: 10/1/2024    Physician Orders: PT Eval and Treat   Medical Diagnosis from Referral: Hamstring strain, right, initial encounter [S76.311A]   Evaluation Date: 8/20/2024  Authorization Period Expiration: 12/31/2024  Plan of Care Expiration: 10/20/2024  Progress Note Due: 9/20/2024  Date of Surgery: N/a  Visit # / Visits authorized: 1/1; 4/20   FOTO: 1/ 3     Precautions: Standard      PTA Visit #: 3/5     Time In: 105 PM   Time Out: 158 PM   Total Billable Time: 53 minutes    Subjective     Pt reports: feeling much better since attending Physical Therapist.     She was compliant with home exercise program.  Response to previous treatment: initial eval   Functional change: none     Pain: 1/10  Location: right hamstring strain     Objective      Objective Measures updated at progress report unless specified.     Treatment     Jyoti received the treatments listed below:      therapeutic exercises to develop strength, ROM, flexibility, posture, and core stabilization for 33 minutes including:    Scifit 5 minutes   Hamstring curls 20 lbs 2 x 10   Standing soleus stretch 3 x 30 second hold NP   Supine hamstring digs 2 x 10 3 second hold each   Supine bridges with heel digs red thera band 2 x 10   Sidelying hip abduction 2 x 10 each   Sidelying clams 2 x 10 each   Prone right hamstring curls 2 x 10 NP  Prone right bent knee hip extension 2 x 10 NP     therapeutic activities to improve functional performance for 20 minutes, including:    Sit to stands 5 lbs 2 x 10   Step ups 6 inch step 2 x 10   Standing bilateral hip extension 2 lbs 2 x 10 each   Standing bilateral hip abduction 2 lbs 2 x 10 each     Patient Education and Home  Exercises       Education provided:   - HEP     Written Home Exercises Provided: yes. Exercises were reviewed and Jyoti was able to demonstrate them prior to the end of the session.  Jyoti demonstrated good  understanding of the education provided. See EMR under Patient Instructions for exercises provided during therapy sessions.    Assessment     Patient able to tolerate added resistance throughout therapeutic exercise without reports of increased or reproduced pain throughout or after session. Patient with some fatigue throughout session, however, patient able to complete full reps with rest breaks throughout session. Patient required verbal cues with clams and sidelying due to patient with hip rotation throughout tasks, however, patient able to complete tasks properly after verbal cues. Will continue to progress patient as tolerated.      Jyoti Is progressing well towards her goals.   Pt prognosis is Good.     Pt will continue to benefit from skilled outpatient physical therapy to address the deficits listed in the problem list box on initial evaluation, provide pt/family education and to maximize pt's level of independence in the home and community environment.     Pt's spiritual, cultural and educational needs considered and pt agreeable to plan of care and goals.     Anticipated barriers to physical therapy: chronicity of pain     Goals:     Short Term Goals (4 Weeks):   1. Pt will be compliant with HEP to supplement PT in restoring pain free function.  2. Pt will improve impaired LE MMTs by 1/2 grade  to improve strength for functional tasks   Long Term Goals (8 Weeks):  1. Pt will improve FOTO score to </= 45% limited to decrease perceived limitation with mobility  2. Pt will improve impaired LE MMTs by 1 grade to improve strength for functional tasks.  3. Pt will ambulate with LRD to improve ADL performance and QOL.  4. Pt will report pain </= 3/10 in R Hamstring to improve QOL and ADL  performance    Plan     Continue with Physical Therapist Plan of Care.     PT/PTA met face to face to discuss pt's treatment plan and progress towards established goals. Pt will be seen by a physical therapist minimally every 6th visit or every 30 days.    Johnathan Alston PTA

## 2024-10-01 ENCOUNTER — CLINICAL SUPPORT (OUTPATIENT)
Dept: REHABILITATION | Facility: HOSPITAL | Age: 64
End: 2024-10-01
Payer: COMMERCIAL

## 2024-10-01 DIAGNOSIS — S76.311A HAMSTRING STRAIN, RIGHT, INITIAL ENCOUNTER: Primary | ICD-10-CM

## 2024-10-01 PROCEDURE — 97110 THERAPEUTIC EXERCISES: CPT | Mod: CQ

## 2024-10-01 PROCEDURE — 97530 THERAPEUTIC ACTIVITIES: CPT | Mod: CQ

## 2024-10-08 PROBLEM — S76.311A HAMSTRING STRAIN, RIGHT, INITIAL ENCOUNTER: Status: ACTIVE | Noted: 2024-10-08

## 2024-10-14 DIAGNOSIS — M17.11 PRIMARY OSTEOARTHRITIS OF RIGHT KNEE: ICD-10-CM

## 2024-10-14 RX ORDER — MELOXICAM 7.5 MG/1
TABLET ORAL
Qty: 60 TABLET | Refills: 0 | Status: SHIPPED | OUTPATIENT
Start: 2024-10-14

## 2024-10-14 NOTE — TELEPHONE ENCOUNTER
No care due was identified.  [facility] Embedded Care Due Messages. Reference number: [id number].   10/14/2024 10:08:37 AM CDT

## 2024-11-15 NOTE — PROGRESS NOTES
HPI    Pt is here today for routine eye exam. Denies pain/discomfort. Stats that   she began seeing floaters Thursday last week in [facility] and now it is OU.  DLS: 4/14/2023 Dr. Rodriguez  (-)Flashes   (+)Floaters   (-)Diplopia   (-)Headaches   (-)Itching   (-)Tearing  (-)Burning  (-)Dryness   (-)Photophobia  (-)Glare   (-)Blurred VA  Past Eye Sx: Cataract OU   Eye Meds: (-)   Last edited by Cherri Negron, OD on 9/18/2024  1:34 PM.            Assessment /Plan     For exam results, see Encounter Report.    Posterior vitreous detachment of right eye  -     Ambulatory referral/consult to Optometry    Pseudophakia of both eyes  -     Ambulatory referral/consult to Optometry      1. Educated pt on findings. No holes, tears, detachments 360 OU. (-)SS OU. Educated on s/s of RD and to RTC ASAP if occur. Monitor 1 month unless changes noted sooner.     2. Monitor; pt educated on condition and visual status.    RTC x 10/24/24 for PVD recheck or sooner if symptoms persist or worsen

## 2024-11-27 ENCOUNTER — OFFICE VISIT (OUTPATIENT)
Dept: URGENT CARE | Facility: CLINIC | Age: 64
End: 2024-11-27
Payer: COMMERCIAL

## 2024-11-27 VITALS
WEIGHT: 189 LBS | DIASTOLIC BLOOD PRESSURE: 78 MMHG | SYSTOLIC BLOOD PRESSURE: 121 MMHG | RESPIRATION RATE: 18 BRPM | TEMPERATURE: 98 F | HEIGHT: 60 IN | OXYGEN SATURATION: 98 % | HEART RATE: 75 BPM | BODY MASS INDEX: 37.11 KG/M2

## 2024-11-27 DIAGNOSIS — R09.81 SINUS CONGESTION: ICD-10-CM

## 2024-11-27 DIAGNOSIS — J06.9 VIRAL URI WITH COUGH: Primary | ICD-10-CM

## 2024-11-27 DIAGNOSIS — R05.9 COUGH, UNSPECIFIED TYPE: ICD-10-CM

## 2024-11-27 LAB
CTP QC/QA: YES
FLUAV AG NPH QL: NEGATIVE
FLUBV AG NPH QL: NEGATIVE
S PYO RRNA THROAT QL PROBE: NEGATIVE
SARS-COV-2 AG RESP QL IA.RAPID: NEGATIVE

## 2024-11-27 PROCEDURE — 87811 SARS-COV-2 COVID19 W/OPTIC: CPT | Mod: QW,S$GLB,,

## 2024-11-27 PROCEDURE — 87804 INFLUENZA ASSAY W/OPTIC: CPT | Mod: QW,,,

## 2024-11-27 PROCEDURE — 87880 STREP A ASSAY W/OPTIC: CPT | Mod: QW,,,

## 2024-11-27 PROCEDURE — 99214 OFFICE O/P EST MOD 30 MIN: CPT | Mod: S$GLB,,,

## 2024-11-27 RX ORDER — BENZONATATE 200 MG/1
200 CAPSULE ORAL 3 TIMES DAILY PRN
Qty: 30 CAPSULE | Refills: 0 | Status: SHIPPED | OUTPATIENT
Start: 2024-11-27 | End: 2024-12-07

## 2024-11-27 RX ORDER — GUAIFENESIN AND DEXTROMETHORPHAN HYDROBROMIDE 10; 100 MG/5ML; MG/5ML
5 SYRUP ORAL EVERY 6 HOURS PRN
Qty: 200 ML | Refills: 0 | Status: SHIPPED | OUTPATIENT
Start: 2024-11-27 | End: 2024-12-07

## 2024-11-27 RX ORDER — FLUTICASONE PROPIONATE 50 MCG
1 SPRAY, SUSPENSION (ML) NASAL DAILY
Qty: 15.8 ML | Refills: 0 | Status: SHIPPED | OUTPATIENT
Start: 2024-11-27

## 2024-11-27 RX ORDER — CETIRIZINE HYDROCHLORIDE 10 MG/1
10 TABLET ORAL DAILY
Qty: 30 TABLET | Refills: 0 | Status: SHIPPED | OUTPATIENT
Start: 2024-11-27

## 2024-11-27 NOTE — PROGRESS NOTES
Subjective:      Patient ID: Jyoti Blue is a 64 y.o. female.    Vitals:  height is 5' (1.524 m) and weight is 85.7 kg (189 lb). Her oral temperature is 98.4 °F (36.9 °C). Her blood pressure is 121/78 and her pulse is 75. Her respiration is 18 and oxygen saturation is 98%.     Chief Complaint: Cough    64-year-old female presents for evaluation of cough, sinus congestion, postnasal drip, sore throat.  Symptoms started yesterday.  Patient denies fever, chills, shortness of breath or difficulty breathing.  Patient has had no known sick exposures.    Cough  This is a new problem. The current episode started yesterday (last night). Associated symptoms include nasal congestion, postnasal drip and a sore throat. Pertinent negatives include no chills, fever, shortness of breath or wheezing. Exacerbated by: talking to much. Treatments tried: claritin and cough drops. The treatment provided no relief.       Constitution: Positive for fatigue. Negative for chills and fever.   HENT:  Positive for congestion, postnasal drip, sore throat and voice change.    Neck: neck negative.   Respiratory:  Positive for cough. Negative for sputum production, shortness of breath and wheezing.    Musculoskeletal: Negative.    Neurological: Negative.       Objective:     Physical Exam   Constitutional: She is oriented to person, place, and time. She appears well-developed. She is cooperative.  Non-toxic appearance. She does not appear ill. No distress.   HENT:   Head: Normocephalic and atraumatic.   Ears:   Right Ear: Hearing and external ear normal.   Left Ear: Hearing and external ear normal.   Nose: Congestion present. No mucosal edema, rhinorrhea or nasal deformity. No epistaxis. Right sinus exhibits no maxillary sinus tenderness and no frontal sinus tenderness. Left sinus exhibits no maxillary sinus tenderness and no frontal sinus tenderness.   Mouth/Throat: Uvula is midline, oropharynx is clear and moist and mucous membranes are  normal. Mucous membranes are moist. No trismus in the jaw. Normal dentition. No uvula swelling. No oropharyngeal exudate, posterior oropharyngeal edema or posterior oropharyngeal erythema.   Eyes: Conjunctivae and lids are normal. No scleral icterus.   Neck: Trachea normal and phonation normal. Neck supple. No edema present. No erythema present. No neck rigidity present.   Cardiovascular: Normal rate, regular rhythm and normal heart sounds.   Pulmonary/Chest: Effort normal and breath sounds normal. No respiratory distress. She has no decreased breath sounds. She has no wheezes. She has no rhonchi. She has no rales.   Abdominal: Normal appearance.   Musculoskeletal: Normal range of motion.         General: No deformity. Normal range of motion.   Neurological: She is alert and oriented to person, place, and time. She displays no weakness. She exhibits normal muscle tone.   Skin: Skin is warm, dry, intact, not diaphoretic and not pale.   Psychiatric: Her speech is normal and behavior is normal. Judgment and thought content normal.   Nursing note and vitals reviewed.      Assessment:     1. Viral URI with cough    2. Cough, unspecified type    3. Sinus congestion        Plan:       Viral URI with cough    Cough, unspecified type  -     SARS Coronavirus 2 Antigen, POCT Manual Read  -     POCT Influenza A/B Rapid Antigen  -     POCT rapid strep A  -     dextromethorphan-guaiFENesin  mg/5 ml (ROBITUSSIN-DM)  mg/5 mL liquid; Take 5 mLs by mouth every 6 (six) hours as needed (cough).  Dispense: 200 mL; Refill: 0  -     benzonatate (TESSALON) 200 MG capsule; Take 1 capsule (200 mg total) by mouth 3 (three) times daily as needed for Cough.  Dispense: 30 capsule; Refill: 0    Sinus congestion  -     fluticasone propionate (FLONASE) 50 mcg/actuation nasal spray; 1 spray (50 mcg total) by Each Nostril route once daily.  Dispense: 15.8 mL; Refill: 0  -     cetirizine (ZYRTEC) 10 MG tablet; Take 1 tablet (10 mg total)  by mouth once daily.  Dispense: 30 tablet; Refill: 0      COVID: Negative  Flu: Neg  Strep: Negative    Discussed medication with patient who acknowledges understanding and is agreeable to POC. Follow up with primary care. Increase fluid intake. Red flags for ER discussed.

## 2024-11-27 NOTE — PATIENT INSTRUCTIONS
Symptomatic treatment to include:     Rest, increase fluid intake to include electrolyte replacement  Ibuprofen/Tylenol as directed for fever, sore throat, body aches  Zrytec and flonase for sinus symptoms  Guaifenesin DM cough syrup for daytime use  Tessalon for relief of sore throat and cough.  Warm, salt water gargles, over the counter throat lozenges or sprays as desires.   ER for difficulty breathing not relieved by rest, excessive lethargy and/or change in mental status

## 2024-12-31 ENCOUNTER — OFFICE VISIT (OUTPATIENT)
Dept: URGENT CARE | Facility: CLINIC | Age: 64
End: 2024-12-31
Payer: COMMERCIAL

## 2024-12-31 VITALS
BODY MASS INDEX: 36.71 KG/M2 | SYSTOLIC BLOOD PRESSURE: 150 MMHG | HEIGHT: 60 IN | WEIGHT: 187 LBS | RESPIRATION RATE: 16 BRPM | HEART RATE: 89 BPM | TEMPERATURE: 102 F | DIASTOLIC BLOOD PRESSURE: 81 MMHG | OXYGEN SATURATION: 97 %

## 2024-12-31 DIAGNOSIS — R05.9 COUGH, UNSPECIFIED TYPE: ICD-10-CM

## 2024-12-31 DIAGNOSIS — R50.9 FEVER, UNSPECIFIED FEVER CAUSE: ICD-10-CM

## 2024-12-31 DIAGNOSIS — J06.9 ACUTE URI: Primary | ICD-10-CM

## 2024-12-31 LAB
BCS RECOMMENDATION EXT: NORMAL
CTP QC/QA: YES
CTP QC/QA: YES
FLUAV AG NPH QL: NEGATIVE
FLUBV AG NPH QL: NEGATIVE
SARS-COV-2 AG RESP QL IA.RAPID: NEGATIVE

## 2024-12-31 PROCEDURE — 87811 SARS-COV-2 COVID19 W/OPTIC: CPT | Mod: QW,S$GLB,, | Performed by: NURSE PRACTITIONER

## 2024-12-31 PROCEDURE — 87804 INFLUENZA ASSAY W/OPTIC: CPT | Mod: QW,,, | Performed by: NURSE PRACTITIONER

## 2024-12-31 PROCEDURE — 99214 OFFICE O/P EST MOD 30 MIN: CPT | Mod: 25,S$GLB,, | Performed by: NURSE PRACTITIONER

## 2024-12-31 RX ORDER — FLUTICASONE PROPIONATE 50 MCG
1 SPRAY, SUSPENSION (ML) NASAL DAILY
Qty: 9.9 ML | Refills: 0 | Status: SHIPPED | OUTPATIENT
Start: 2024-12-31

## 2024-12-31 RX ORDER — PROMETHAZINE HYDROCHLORIDE AND DEXTROMETHORPHAN HYDROBROMIDE 6.25; 15 MG/5ML; MG/5ML
5 SYRUP ORAL EVERY 4 HOURS PRN
Qty: 180 ML | Refills: 0 | Status: SHIPPED | OUTPATIENT
Start: 2024-12-31 | End: 2025-01-10

## 2024-12-31 RX ORDER — AMOXICILLIN 500 MG/1
500 TABLET, FILM COATED ORAL EVERY 12 HOURS
Qty: 20 TABLET | Refills: 0 | Status: SHIPPED | OUTPATIENT
Start: 2024-12-31 | End: 2025-01-10

## 2024-12-31 RX ORDER — ACETAMINOPHEN 500 MG
1000 TABLET ORAL
Status: COMPLETED | OUTPATIENT
Start: 2024-12-31 | End: 2024-12-31

## 2024-12-31 RX ORDER — GUAIFENESIN 600 MG/1
1200 TABLET, EXTENDED RELEASE ORAL 2 TIMES DAILY
Qty: 40 TABLET | Refills: 0 | Status: SHIPPED | OUTPATIENT
Start: 2024-12-31 | End: 2025-01-10

## 2024-12-31 RX ADMIN — Medication 1000 MG: at 03:12

## 2024-12-31 NOTE — PROGRESS NOTES
Subjective:      Patient ID: Jyoti Blue is a 64 y.o. female.    Vitals:  height is 5' (1.524 m) and weight is 84.8 kg (187 lb). Her temperature is 102.1 °F (38.9 °C) (abnormal). Her blood pressure is 150/81 (abnormal) and her pulse is 89. Her respiration is 16 and oxygen saturation is 97%.     Chief Complaint: Sinus Problem    Pt c/o cough, nasal congestion, sore throat, chills, fever starting today, 12/31/24.    Sinus Problem  Associated symptoms include chills, congestion, coughing, headaches, sinus pressure and a sore throat. Pertinent negatives include no ear pain, neck pain or shortness of breath.       Constitution: Positive for chills, fatigue and fever. Negative for activity change, appetite change, unexpected weight change and generalized weakness.   HENT:  Positive for congestion, postnasal drip, sinus pressure and sore throat. Negative for ear pain, ear discharge, foreign body in ear, tinnitus, hearing loss, dental problem, mouth sores, tongue pain, facial swelling, sinus pain, trouble swallowing and voice change.    Neck: Negative for neck pain, neck stiffness and painful lymph nodes.   Cardiovascular:  Negative for chest pain, leg swelling, palpitations and sob on exertion.   Eyes:  Negative for eye trauma, eye discharge, eye itching, eye pain, eye redness, vision loss and eyelid swelling.   Respiratory:  Positive for cough and sputum production (Green). Negative for chest tightness, COPD, shortness of breath, wheezing and asthma.    Gastrointestinal:  Negative for abdominal pain, nausea, vomiting, constipation, diarrhea, bright red blood in stool and dark colored stools.   Endocrine: hair loss, cold intolerance and heat intolerance.   Genitourinary:  Negative for dysuria, frequency, urgency and hematuria.   Musculoskeletal:  Positive for muscle ache. Negative for pain, trauma, joint pain, joint swelling and abnormal ROM of joint.   Skin:  Negative for color change, pale, rash, wound and  hives.   Allergic/Immunologic: Negative for environmental allergies, seasonal allergies, food allergies, asthma, hives and itching.   Neurological:  Positive for headaches. Negative for dizziness, history of vertigo, light-headedness, facial drooping, speech difficulty, disorientation, altered mental status, loss of consciousness and numbness.   Hematologic/Lymphatic: Negative for swollen lymph nodes and easy bruising/bleeding. Does not bruise/bleed easily.   Psychiatric/Behavioral:  Negative for altered mental status, disorientation, confusion, agitation, sleep disturbance and hallucinations.       Objective:     Physical Exam   Constitutional: She is oriented to person, place, and time. She appears well-developed. She is cooperative.   HENT:   Head: Normocephalic and atraumatic.   Ears:   Right Ear: Hearing, external ear and ear canal normal. A middle ear effusion is present.   Left Ear: Hearing, external ear and ear canal normal. A middle ear effusion is present.   Nose: Mucosal edema and rhinorrhea present. No nasal deformity. No epistaxis. Right sinus exhibits no maxillary sinus tenderness and no frontal sinus tenderness. Left sinus exhibits no maxillary sinus tenderness and no frontal sinus tenderness.   Mouth/Throat: Uvula is midline and mucous membranes are normal. No trismus in the jaw. Normal dentition. No uvula swelling. Posterior oropharyngeal edema and posterior oropharyngeal erythema present.   Eyes: Conjunctivae and lids are normal.   Neck: Trachea normal and phonation normal. Neck supple.   Cardiovascular: Normal rate, regular rhythm, normal heart sounds and normal pulses.   Pulmonary/Chest: Effort normal and breath sounds normal.   Abdominal: Normal appearance and bowel sounds are normal. Soft.   Musculoskeletal: Normal range of motion.         General: Normal range of motion.   Lymphadenopathy:     She has cervical adenopathy.        Left cervical: Superficial cervical adenopathy present.    Neurological: She is alert and oriented to person, place, and time. She exhibits normal muscle tone.   Skin: Skin is warm, dry and intact.   Psychiatric: Her speech is normal and behavior is normal. Judgment and thought content normal.   Nursing note and vitals reviewed.      Assessment:     1. Acute URI    2. Cough, unspecified type    3. Fever, unspecified fever cause        Plan:       Acute URI  -     amoxicillin (AMOXIL) 500 MG Tab; Take 1 tablet (500 mg total) by mouth every 12 (twelve) hours. for 10 days  Dispense: 20 tablet; Refill: 0  -     fluticasone propionate (FLONASE) 50 mcg/actuation nasal spray; 1 spray (50 mcg total) by Each Nostril route once daily.  Dispense: 9.9 mL; Refill: 0  -     guaiFENesin (MUCINEX) 600 mg 12 hr tablet; Take 2 tablets (1,200 mg total) by mouth 2 (two) times daily. for 10 days  Dispense: 40 tablet; Refill: 0  -     promethazine-dextromethorphan (PROMETHAZINE-DM) 6.25-15 mg/5 mL Syrp; Take 5 mLs by mouth every 4 (four) hours as needed (cough).  Dispense: 180 mL; Refill: 0    Cough, unspecified type  -     SARS Coronavirus 2 Antigen, POCT Manual Read  -     POCT Influenza A/B Rapid Antigen  -     promethazine-dextromethorphan (PROMETHAZINE-DM) 6.25-15 mg/5 mL Syrp; Take 5 mLs by mouth every 4 (four) hours as needed (cough).  Dispense: 180 mL; Refill: 0    Fever, unspecified fever cause  -     acetaminophen tablet 1,000 mg administered by mouth in clinic      Utilize over-the-counter Tylenol or Motrin as directed for fever.    Ensure adequate fluid intake with electrolytes.    Return to clinic for new or worsening symptoms.  Patient is recommended to follow-up with their PCP post discharge.    Total time spent on med rec, H&P, with over half of the time in direct patient care: 33 minutes      DISCLAIMER: Please note that my documentation in this Electronic Healthcare Record was produced using speech recognition software and therefore may contain errors related to that software  system.These could include grammar, punctuation and spelling errors or the inclusion/exclusion of phrases that were not intended. Garbled syntax, mangled pronouns, and other bizarre constructions may be attributed to that software system.        Additional MDM:     Heart Failure Score:   COPD = No

## 2024-12-31 NOTE — LETTER
December 31, 2024      [facility]  [id number] [address] [id number]       Patient: Jyoti Blue   YOB: 1960  Date of Visit: 12/31/2024    To Whom It May Concern:    Deena Blue  was at [facility] on 12/31/2024. The patient may return to work/school on 1/6 with no restrictions. If you have any questions or concerns, or if I can be of further assistance, please do not hesitate to contact me.    Sincerely,    Cristhian Gastelum NP

## 2025-01-02 ENCOUNTER — OFFICE VISIT (OUTPATIENT)
Dept: URGENT CARE | Facility: CLINIC | Age: 65
End: 2025-01-02
Payer: COMMERCIAL

## 2025-01-02 VITALS
DIASTOLIC BLOOD PRESSURE: 84 MMHG | HEART RATE: 81 BPM | TEMPERATURE: 99 F | WEIGHT: 187 LBS | RESPIRATION RATE: 16 BRPM | BODY MASS INDEX: 36.71 KG/M2 | OXYGEN SATURATION: 98 % | HEIGHT: 60 IN | SYSTOLIC BLOOD PRESSURE: 140 MMHG

## 2025-01-02 DIAGNOSIS — J02.9 ACUTE PHARYNGITIS, UNSPECIFIED ETIOLOGY: Primary | ICD-10-CM

## 2025-01-02 DIAGNOSIS — J06.9 ACUTE URI: ICD-10-CM

## 2025-01-02 DIAGNOSIS — J02.9 SORE THROAT: ICD-10-CM

## 2025-01-02 RX ORDER — DEXAMETHASONE SODIUM PHOSPHATE 4 MG/ML
4 INJECTION, SOLUTION INTRA-ARTICULAR; INTRALESIONAL; INTRAMUSCULAR; INTRAVENOUS; SOFT TISSUE
Status: DISCONTINUED | OUTPATIENT
Start: 2025-01-02 | End: 2025-01-02

## 2025-01-02 RX ORDER — DEXAMETHASONE SODIUM PHOSPHATE 4 MG/ML
4 INJECTION, SOLUTION INTRA-ARTICULAR; INTRALESIONAL; INTRAMUSCULAR; INTRAVENOUS; SOFT TISSUE
Status: COMPLETED | OUTPATIENT
Start: 2025-01-02 | End: 2025-01-02

## 2025-01-02 RX ADMIN — DEXAMETHASONE SODIUM PHOSPHATE 4 MG: 4 INJECTION, SOLUTION INTRA-ARTICULAR; INTRALESIONAL; INTRAMUSCULAR; INTRAVENOUS; SOFT TISSUE at 03:01

## 2025-01-02 NOTE — PATIENT INSTRUCTIONS
Continue medications prescribed on 12/31/2024 as discussed.    Utilize over-the-counter Tylenol or Motrin as directed for fever.    Ensure adequate fluid intake with electrolytes.    Thank you for the opportunity to care for you today.  Please take all medications as directed, and continue any previously prescribed medications unless we specifically discussed discontinuing them.  If your symptoms do not resolve or worsen please return to the clinic for re-evaluation.  If your situation becomes emergent, please present to the nearest emergency department.  Follow-up with your PCP for continued evaluation and management.

## 2025-01-02 NOTE — PROGRESS NOTES
Subjective:      Patient ID: Jyoti Blue is a 64 y.o. female.    Vitals:  height is 5' (1.524 m) and weight is 84.8 kg (187 lb). Her oral temperature is 99.3 °F (37.4 °C). Her blood pressure is 140/84 (abnormal) and her pulse is 81. Her respiration is 16 and oxygen saturation is 98%.     Chief Complaint: Sore Throat    Patient is 64-year-old female who presents to clinic with complaints of sore throat, congestion, cough, postnasal drip, rhinorrhea, and fever.  Patient was seen at this clinic on 12/31/2024 and diagnosed with URI, was prescribed amoxicillin, Flonase, Mucinex, and promethazine DM at that time.    Sore Throat   This is a new problem. Episode onset: x 2 days. The problem has been unchanged. Associated symptoms include congestion, coughing and headaches. Pertinent negatives include no abdominal pain, diarrhea, ear discharge, ear pain, neck pain, shortness of breath, trouble swallowing or vomiting. Treatments tried: Treated here in clinic Tuesday for the same symptoms.       Constitution: Positive for chills, fatigue and fever. Negative for activity change, appetite change, unexpected weight change and generalized weakness.   HENT:  Positive for congestion, postnasal drip, sinus pressure and sore throat. Negative for ear pain, ear discharge, foreign body in ear, tinnitus, hearing loss, dental problem, mouth sores, tongue pain, facial swelling, sinus pain, trouble swallowing and voice change.    Neck: Negative for neck pain, neck stiffness and painful lymph nodes.   Cardiovascular:  Negative for chest pain, leg swelling, palpitations and sob on exertion.   Eyes:  Negative for eye trauma, eye discharge, eye itching, eye pain, eye redness, vision loss and eyelid swelling.   Respiratory:  Positive for cough and sputum production (Green). Negative for chest tightness, COPD, shortness of breath, wheezing and asthma.    Gastrointestinal:  Negative for abdominal pain, nausea, vomiting, constipation,  diarrhea, bright red blood in stool and dark colored stools.   Endocrine: hair loss, cold intolerance and heat intolerance.   Genitourinary:  Negative for dysuria, frequency, urgency and hematuria.   Musculoskeletal:  Positive for muscle ache. Negative for pain, trauma, joint pain, joint swelling and abnormal ROM of joint.   Skin:  Negative for color change, pale, rash, wound and hives.   Allergic/Immunologic: Negative for environmental allergies, seasonal allergies, food allergies, asthma, hives and itching.   Neurological:  Positive for headaches. Negative for dizziness, history of vertigo, light-headedness, facial drooping, speech difficulty, disorientation, altered mental status, loss of consciousness and numbness.   Hematologic/Lymphatic: Negative for swollen lymph nodes and easy bruising/bleeding. Does not bruise/bleed easily.   Psychiatric/Behavioral:  Negative for altered mental status, disorientation, confusion, agitation, sleep disturbance and hallucinations.       Objective:     Physical Exam   Constitutional: She is oriented to person, place, and time. She appears well-developed. She is cooperative.   HENT:   Head: Normocephalic and atraumatic.   Ears:   Right Ear: Hearing, external ear and ear canal normal. A middle ear effusion is present.   Left Ear: Hearing, external ear and ear canal normal. A middle ear effusion is present.   Nose: Mucosal edema and rhinorrhea present. No nasal deformity. No epistaxis. Right sinus exhibits no maxillary sinus tenderness and no frontal sinus tenderness. Left sinus exhibits no maxillary sinus tenderness and no frontal sinus tenderness.   Mouth/Throat: Uvula is midline and mucous membranes are normal. No trismus in the jaw. Normal dentition. No uvula swelling. Posterior oropharyngeal edema and posterior oropharyngeal erythema present.   Eyes: Conjunctivae and lids are normal.   Neck: Trachea normal and phonation normal. Neck supple.   Cardiovascular: Normal rate,  regular rhythm, normal heart sounds and normal pulses.   Pulmonary/Chest: Effort normal and breath sounds normal.   Abdominal: Normal appearance and bowel sounds are normal. Soft.   Musculoskeletal: Normal range of motion.         General: Normal range of motion.   Lymphadenopathy:     She has cervical adenopathy.        Left cervical: Superficial cervical adenopathy present.   Neurological: She is alert and oriented to person, place, and time. She exhibits normal muscle tone.   Skin: Skin is warm, dry and intact.   Psychiatric: Her speech is normal and behavior is normal. Judgment and thought content normal.   Nursing note and vitals reviewed.      Assessment:     1. Acute pharyngitis, unspecified etiology    2. Acute URI    3. Sore throat        Plan:       Acute pharyngitis, unspecified etiology  -     dexAMETHasone injection 4 mg administered intramuscularly in clinic    Acute URI  -     recommend continuing medications prescribed on 12/31/2024.    Sore throat  -     benzocaine-menthoL 6-10 mg lozenge; Take 1 lozenge by mouth every 2 (two) hours as needed for Pain.  Dispense: 18 tablet; Refill: 0    Utilize over-the-counter Tylenol or Motrin as directed for fever.    Ensure adequate fluid intake with electrolytes.    Return to clinic for new or worsening symptoms.  Patient is recommended to follow-up with their PCP post discharge.    Total time spent on med rec, H&P, with over half of the time in direct patient care: 33 minutes      DISCLAIMER: Please note that my documentation in this Electronic Healthcare Record was produced using speech recognition software and therefore may contain errors related to that software system.These could include grammar, punctuation and spelling errors or the inclusion/exclusion of phrases that were not intended. Garbled syntax, mangled pronouns, and other bizarre constructions may be attributed to that software system.          Additional MDM:     Heart Failure Score:   COPD =  No

## 2025-01-14 RX ORDER — FLUTICASONE PROPIONATE 50 MCG
SPRAY, SUSPENSION (ML) NASAL
Qty: 48 G | OUTPATIENT
Start: 2025-01-14

## 2025-01-17 ENCOUNTER — TELEPHONE (OUTPATIENT)
Dept: PRIMARY CARE CLINIC | Facility: CLINIC | Age: 65
End: 2025-01-17
Payer: COMMERCIAL

## 2025-01-17 NOTE — TELEPHONE ENCOUNTER
Spoke with pt. Pt stated that she has 103 fever, oxygen 92-93, cough. Advised pt to seek treatment at ER or UC due to no appointment availabilities. Pt verbalized understanding.

## 2025-01-17 NOTE — TELEPHONE ENCOUNTER
----- Message from Tess sent at 1/17/2025 12:14 PM CST -----  Contact: [phone] (M)  Patient called, would like a call back from nurse about patient went to the urgent care decline services and advise her to contact PCP to have the flu test again, and xray.

## 2025-01-23 ENCOUNTER — TELEPHONE (OUTPATIENT)
Dept: PRIMARY CARE CLINIC | Facility: CLINIC | Age: 65
End: 2025-01-23
Payer: COMMERCIAL

## 2025-01-24 ENCOUNTER — HOSPITAL ENCOUNTER (OUTPATIENT)
Dept: RADIOLOGY | Facility: CLINIC | Age: 65
Discharge: HOME | End: 2025-01-24
Attending: FAMILY MEDICINE
Payer: COMMERCIAL

## 2025-01-24 ENCOUNTER — OFFICE VISIT (OUTPATIENT)
Dept: FAMILY MEDICINE | Facility: CLINIC | Age: 65
End: 2025-01-24
Payer: COMMERCIAL

## 2025-01-24 VITALS
TEMPERATURE: 98 F | OXYGEN SATURATION: 97 % | BODY MASS INDEX: 36.94 KG/M2 | RESPIRATION RATE: 18 BRPM | WEIGHT: 189.13 LBS | SYSTOLIC BLOOD PRESSURE: 98 MMHG | DIASTOLIC BLOOD PRESSURE: 72 MMHG | HEART RATE: 88 BPM

## 2025-01-24 DIAGNOSIS — D50.9 MICROCYTIC ANEMIA: ICD-10-CM

## 2025-01-24 DIAGNOSIS — J06.9 RECURRENT URI (UPPER RESPIRATORY INFECTION): ICD-10-CM

## 2025-01-24 DIAGNOSIS — J06.9 RECURRENT URI (UPPER RESPIRATORY INFECTION): Primary | ICD-10-CM

## 2025-01-24 PROCEDURE — 3074F SYST BP LT 130 MM HG: CPT | Mod: CPTII,S$GLB,, | Performed by: FAMILY MEDICINE

## 2025-01-24 PROCEDURE — 71046 X-RAY EXAM CHEST 2 VIEWS: CPT | Mod: 26,,, | Performed by: STUDENT IN AN ORGANIZED HEALTH CARE EDUCATION/TRAINING PROGRAM

## 2025-01-24 PROCEDURE — 99213 OFFICE O/P EST LOW 20 MIN: CPT | Mod: S$GLB,,, | Performed by: FAMILY MEDICINE

## 2025-01-24 PROCEDURE — 71046 X-RAY EXAM CHEST 2 VIEWS: CPT | Mod: TC,FY,PO

## 2025-01-24 PROCEDURE — 3008F BODY MASS INDEX DOCD: CPT | Mod: CPTII,S$GLB,, | Performed by: FAMILY MEDICINE

## 2025-01-24 PROCEDURE — 3078F DIAST BP <80 MM HG: CPT | Mod: CPTII,S$GLB,, | Performed by: FAMILY MEDICINE

## 2025-01-24 PROCEDURE — 1160F RVW MEDS BY RX/DR IN RCRD: CPT | Mod: CPTII,S$GLB,, | Performed by: FAMILY MEDICINE

## 2025-01-24 PROCEDURE — 1159F MED LIST DOCD IN RCRD: CPT | Mod: CPTII,S$GLB,, | Performed by: FAMILY MEDICINE

## 2025-01-24 PROCEDURE — 99999 PR PBB SHADOW E&M-EST. PATIENT-LVL V: CPT | Mod: PBBFAC,,, | Performed by: FAMILY MEDICINE

## 2025-01-24 NOTE — PATIENT INSTRUCTIONS
Vitamin  D3 5,000 IU a day - Lifetime    Vitamin C 1,000 mg three times a day - for acute URI type illness.    Contact your PCP if any worsening or for any new concerns as we discussed.

## 2025-01-24 NOTE — PROGRESS NOTES
Subjective:       Patient ID: Jyoti Blue is a 64 y.o. female.    Chief Complaint: No chief complaint on file.    New to me patient here for UC visit.  Onset 9 days ago of acute URI w fever to 103, high HR and seen initially at [facility] and then sent to external UC a week ago and was negative for Covid and Flu at both locations.  Rxed Amoxil at the UC visit.   Improved quite a it but still has some cough with clear sputum.  No post-tussive HA.  Concerned re having acute URI's at end Nov 24; New Years Daniela and again now.  Takes Vit D - ? Dose daily and Vit C as well.  Does routine hand-washing and alcohol wipes.    Past lab showed in 9/24, very mild anemia.    Review of Systems   Constitutional:  Negative for fever.   Respiratory:  Positive for cough. Negative for shortness of breath.    Cardiovascular:  Negative for chest pain.   Gastrointestinal:  Negative for abdominal pain and nausea.   Skin:  Negative for rash.   All other systems reviewed and are negative.      Objective:      Physical Exam  Vitals reviewed.   Constitutional:       General: She is not in acute distress.     Appearance: Normal appearance. She is well-developed. She is not ill-appearing.   HENT:      Right Ear: Tympanic membrane normal. Tympanic membrane is not erythematous.      Left Ear: Tympanic membrane normal. Tympanic membrane is not erythematous.      Nose: Mucosal edema present.      Right Sinus: No maxillary sinus tenderness.      Left Sinus: No maxillary sinus tenderness.      Mouth/Throat:      Pharynx: Posterior oropharyngeal erythema present.   Cardiovascular:      Rate and Rhythm: Normal rate and regular rhythm.      Heart sounds: No murmur heard.     Comments: BP 98/72 - low for her; nml Sat at 97%  Pulmonary:      Effort: Pulmonary effort is normal.      Breath sounds: Normal breath sounds.      Comments: B/l post bases - some intermittent rales noted  Musculoskeletal:      Cervical back: Neck supple.    Lymphadenopathy:      Cervical: No cervical adenopathy.   Skin:     General: Skin is warm and dry.   Neurological:      Mental Status: She is alert.         Assessment:       1. Recurrent URI (upper respiratory infection)    2. Microcytic anemia      Will check labs and CXR  Plan:       Recurrent URI (upper respiratory infection)  -     X-Ray Chest PA And Lateral; Future; Expected date: 01/24/2025  -     CBC W/ AUTO DIFFERENTIAL; Future; Expected date: 01/24/2025  -     COMPREHENSIVE METABOLIC PANEL; Future; Expected date: 01/24/2025    Microcytic anemia  -     CBC W/ AUTO DIFFERENTIAL; Future; Expected date: 01/24/2025      Patient Instructions   Vitamin  D3 5,000 IU a day - Lifetime    Vitamin C 1,000 mg three times a day - for acute URI type illness.    Contact your PCP if any worsening or for any new concerns as we discussed.

## 2025-01-27 ENCOUNTER — TELEPHONE (OUTPATIENT)
Dept: PRIMARY CARE CLINIC | Facility: CLINIC | Age: 65
End: 2025-01-27

## 2025-01-27 DIAGNOSIS — R73.03 PREDIABETES: ICD-10-CM

## 2025-01-27 DIAGNOSIS — D64.9 ANEMIA, UNSPECIFIED TYPE: Primary | ICD-10-CM

## 2025-01-29 ENCOUNTER — TELEPHONE (OUTPATIENT)
Dept: PRIMARY CARE CLINIC | Facility: CLINIC | Age: 65
End: 2025-01-29
Payer: COMMERCIAL

## 2025-01-29 NOTE — TELEPHONE ENCOUNTER
----- Message from Francie Yip MD sent at 1/27/2025  3:45 PM CST -----  Your recent blood work shows that you are slightly anemic, I am going to place orders for some more blood test to further evaluate specifically the iron studies.  Also would like for you to do a hemoglobin A1c so we can keep an eye on your blood sugar.  I will have my staff reach out to help you schedule.

## 2025-01-30 ENCOUNTER — LAB VISIT (OUTPATIENT)
Dept: LAB | Facility: HOSPITAL | Age: 65
End: 2025-01-30
Attending: FAMILY MEDICINE
Payer: COMMERCIAL

## 2025-01-30 DIAGNOSIS — D64.9 ANEMIA, UNSPECIFIED TYPE: ICD-10-CM

## 2025-01-30 DIAGNOSIS — R73.03 PREDIABETES: ICD-10-CM

## 2025-01-30 LAB
ESTIMATED AVG GLUCOSE: 128 MG/DL (ref 68–131)
FERRITIN SERPL-MCNC: 127 NG/ML (ref 20–300)
HBA1C MFR BLD: 6.1 % (ref 4–5.6)
IRON SERPL-MCNC: 78 UG/DL (ref 30–160)
SATURATED IRON: 26 % (ref 20–50)
TOTAL IRON BINDING CAPACITY: 303 UG/DL (ref 250–450)
TRANSFERRIN SERPL-MCNC: 205 MG/DL (ref 200–375)

## 2025-01-30 PROCEDURE — 83036 HEMOGLOBIN GLYCOSYLATED A1C: CPT | Performed by: FAMILY MEDICINE

## 2025-01-30 PROCEDURE — 36415 COLL VENOUS BLD VENIPUNCTURE: CPT | Performed by: FAMILY MEDICINE

## 2025-01-30 PROCEDURE — 84466 ASSAY OF TRANSFERRIN: CPT | Performed by: FAMILY MEDICINE

## 2025-01-30 PROCEDURE — 82728 ASSAY OF FERRITIN: CPT | Performed by: FAMILY MEDICINE

## 2025-02-17 ENCOUNTER — TELEPHONE (OUTPATIENT)
Dept: DERMATOLOGY | Facility: CLINIC | Age: 65
End: 2025-02-17
Payer: COMMERCIAL

## 2025-02-18 ENCOUNTER — OFFICE VISIT (OUTPATIENT)
Dept: DERMATOLOGY | Facility: CLINIC | Age: 65
End: 2025-02-18
Payer: COMMERCIAL

## 2025-02-18 DIAGNOSIS — D48.5 NEOPLASM OF UNCERTAIN BEHAVIOR OF SKIN: ICD-10-CM

## 2025-02-18 DIAGNOSIS — L30.9 ECZEMA, UNSPECIFIED TYPE: Primary | ICD-10-CM

## 2025-02-18 RX ORDER — TRIAMCINOLONE ACETONIDE 1 MG/G
CREAM TOPICAL
Qty: 30 G | Refills: 2 | Status: SHIPPED | OUTPATIENT
Start: 2025-02-18

## 2025-02-18 NOTE — PROGRESS NOTES
Subjective:      Patient ID:  Jyoti Blue is a 64 y.o. female who presents for   Chief Complaint   Patient presents with    Mass     Left thumb        Jyoti Blue is a 64 y.o. female who presents for: evaluation of skin lesions.    New patient    1. Skin lesion  The patient has the following lesions of concern:  Location: left thumb   Duration: noticed 2 wks ago   Symptoms: tender to touch.   Exacerbating factors: pressure causes pain   Relieving factors/Previous treatments: none     2. Rash   Location: chest   Duration: 2 wks   Symptoms: itch  Previous treatments: benadryl with some relief  Products: Perfume, Caress or Dove soap, Dial or Gain regular detergent      Review of Systems   Skin:  Positive for activity-related sunscreen use. Negative for daily sunscreen use, recent sunburn and wears hat.   Hematologic/Lymphatic: Does not bruise/bleed easily.       Objective:   Physical Exam   Constitutional: She appears well-developed and well-nourished. No distress.   Neurological: She is alert and oriented to person, place, and time. She is not disoriented.   Psychiatric: She has a normal mood and affect.   Skin:                   Diagram Legend     Erythematous scaling macule/papule c/w actinic keratosis       Vascular papule c/w angioma      Pigmented verrucoid papule/plaque c/w seborrheic keratosis      Yellow umbilicated papule c/w sebaceous hyperplasia      Irregularly shaped tan macule c/w lentigo     1-2 mm smooth white papules consistent with Milia      Movable subcutaneous cyst with punctum c/w epidermal inclusion cyst      Subcutaneous movable cyst c/w pilar cyst      Firm pink to brown papule c/w dermatofibroma      Pedunculated fleshy papule(s) c/w skin tag(s)      Evenly pigmented macule c/w junctional nevus     Mildly variegated pigmented, slightly irregular-bordered macule c/w mildly atypical nevus      Flesh colored to evenly pigmented papule c/w intradermal nevus       Pink pearly  papule/plaque c/w basal cell carcinoma      Erythematous hyperkeratotic cursted plaque c/w SCC      Surgical scar with no sign of skin cancer recurrence      Open and closed comedones      Inflammatory papules and pustules      Verrucoid papule consistent consistent with wart     Erythematous eczematous patches and plaques     Dystrophic onycholytic nail with subungual debris c/w onychomycosis     Umbilicated papule    Erythematous-base heme-crusted tan verrucoid plaque consistent with inflamed seborrheic keratosis     Erythematous Silvery Scaling Plaque c/w Psoriasis     See annotation      Assessment / Plan:        Eczema, unspecified type  Favor ACD/ICD  Recommend d/c perfume for at least 1 mo, restart when clear  Also switch to Tide free and gentle detergent and Dove fragrance free soap  Start triamcinolone cream BID to affected area until clear  -     triamcinolone acetonide 0.1% (KENALOG) 0.1 % cream; Apply twice daily to rash 2 weeks on, 1 week off until clear  Dispense: 30 g; Refill: 2  Can consider patch testing if does not improve    Neoplasm of uncertain behavior of skin  Base of L thumb, 4 mm mobile papule with central hyperkeratotic core  Ddx cyst of thumb given there is a subcutaneous/mobile component v wart, corn, callus  Trial of treatment with cryotherapy, refer to hand surgery for consultation for definitive excision    Cryosurgery procedure note:    Verbal consent from the patient is obtained including, but not limited to, risk of hypopigmentation/hyperpigmentation, scar, recurrence of lesion. Liquid nitrogen cryosurgery is applied to 1 lesions to produce a freeze injury. The patient is aware that blisters may form and is instructed on wound care with gentle cleansing and use of vaseline ointment to keep moist until healed. The patient is supplied a handout on cryosurgery and is instructed to call if lesions do not completely resolve.    RTC prn if condition worsens or fails to improve

## 2025-02-24 DIAGNOSIS — R52 PAIN: Primary | ICD-10-CM

## 2025-03-03 ENCOUNTER — LAB VISIT (OUTPATIENT)
Dept: LAB | Facility: HOSPITAL | Age: 65
End: 2025-03-03
Attending: FAMILY MEDICINE
Payer: COMMERCIAL

## 2025-03-03 ENCOUNTER — TELEPHONE (OUTPATIENT)
Dept: ORTHOPEDICS | Facility: CLINIC | Age: 65
End: 2025-03-03
Payer: COMMERCIAL

## 2025-03-03 DIAGNOSIS — E78.5 HYPERLIPIDEMIA, UNSPECIFIED HYPERLIPIDEMIA TYPE: ICD-10-CM

## 2025-03-03 DIAGNOSIS — R73.03 PREDIABETES: ICD-10-CM

## 2025-03-03 DIAGNOSIS — E66.01 CLASS 2 SEVERE OBESITY DUE TO EXCESS CALORIES WITH SERIOUS COMORBIDITY AND BODY MASS INDEX (BMI) OF 36.0 TO 36.9 IN ADULT: ICD-10-CM

## 2025-03-03 DIAGNOSIS — I10 PRIMARY HYPERTENSION: ICD-10-CM

## 2025-03-03 DIAGNOSIS — E66.812 CLASS 2 SEVERE OBESITY DUE TO EXCESS CALORIES WITH SERIOUS COMORBIDITY AND BODY MASS INDEX (BMI) OF 36.0 TO 36.9 IN ADULT: ICD-10-CM

## 2025-03-03 DIAGNOSIS — E55.9 VITAMIN D DEFICIENCY: ICD-10-CM

## 2025-03-03 LAB
25(OH)D3+25(OH)D2 SERPL-MCNC: 26 NG/ML (ref 30–96)
ALBUMIN SERPL BCP-MCNC: 3.5 G/DL (ref 3.5–5.2)
ALP SERPL-CCNC: 99 U/L (ref 40–150)
ALT SERPL W/O P-5'-P-CCNC: 14 U/L (ref 10–44)
ANION GAP SERPL CALC-SCNC: 9 MMOL/L (ref 8–16)
AST SERPL-CCNC: 28 U/L (ref 10–40)
BILIRUB SERPL-MCNC: 0.3 MG/DL (ref 0.1–1)
BUN SERPL-MCNC: 22 MG/DL (ref 8–23)
CALCIUM SERPL-MCNC: 9 MG/DL (ref 8.7–10.5)
CHLORIDE SERPL-SCNC: 103 MMOL/L (ref 95–110)
CO2 SERPL-SCNC: 26 MMOL/L (ref 23–29)
CREAT SERPL-MCNC: 0.8 MG/DL (ref 0.5–1.4)
EST. GFR  (NO RACE VARIABLE): >60 ML/MIN/1.73 M^2
ESTIMATED AVG GLUCOSE: 128 MG/DL (ref 68–131)
GLUCOSE SERPL-MCNC: 113 MG/DL (ref 70–110)
HBA1C MFR BLD: 6.1 % (ref 4–5.6)
POTASSIUM SERPL-SCNC: 3.7 MMOL/L (ref 3.5–5.1)
PROT SERPL-MCNC: 7.5 G/DL (ref 6–8.4)
SODIUM SERPL-SCNC: 138 MMOL/L (ref 136–145)

## 2025-03-03 PROCEDURE — 82306 VITAMIN D 25 HYDROXY: CPT | Performed by: FAMILY MEDICINE

## 2025-03-03 PROCEDURE — 80053 COMPREHEN METABOLIC PANEL: CPT | Performed by: FAMILY MEDICINE

## 2025-03-03 PROCEDURE — 83036 HEMOGLOBIN GLYCOSYLATED A1C: CPT | Performed by: FAMILY MEDICINE

## 2025-03-05 ENCOUNTER — HOSPITAL ENCOUNTER (OUTPATIENT)
Dept: RADIOLOGY | Facility: OTHER | Age: 65
Discharge: HOME | End: 2025-03-05
Attending: SPECIALIST/TECHNOLOGIST
Payer: COMMERCIAL

## 2025-03-05 ENCOUNTER — OFFICE VISIT (OUTPATIENT)
Dept: ORTHOPEDICS | Facility: CLINIC | Age: 65
End: 2025-03-05
Payer: COMMERCIAL

## 2025-03-05 DIAGNOSIS — D48.5 NEOPLASM OF UNCERTAIN BEHAVIOR OF SKIN: ICD-10-CM

## 2025-03-05 DIAGNOSIS — R52 PAIN: ICD-10-CM

## 2025-03-05 PROCEDURE — 1159F MED LIST DOCD IN RCRD: CPT | Mod: CPTII,S$GLB,, | Performed by: SPECIALIST/TECHNOLOGIST

## 2025-03-05 PROCEDURE — 73130 X-RAY EXAM OF HAND: CPT | Mod: 26,LT,, | Performed by: RADIOLOGY

## 2025-03-05 PROCEDURE — 99999 PR PBB SHADOW E&M-EST. PATIENT-LVL IV: CPT | Mod: PBBFAC,,, | Performed by: SPECIALIST/TECHNOLOGIST

## 2025-03-05 PROCEDURE — 3044F HG A1C LEVEL LT 7.0%: CPT | Mod: CPTII,S$GLB,, | Performed by: SPECIALIST/TECHNOLOGIST

## 2025-03-05 PROCEDURE — 99204 OFFICE O/P NEW MOD 45 MIN: CPT | Mod: S$GLB,,, | Performed by: SPECIALIST/TECHNOLOGIST

## 2025-03-05 PROCEDURE — 73130 X-RAY EXAM OF HAND: CPT | Mod: TC,FY,LT

## 2025-03-05 NOTE — PROGRESS NOTES
Patient ID: Jyoti Blue is a 64 y.o. female.    Chief Complaint: Pain of the Left Hand    History of Present Illness    CHIEF COMPLAINT:  - Small, hard, painful mass on the left thumb    HPI:  Jyoti presents with a small, hard, painful mass on her left thumb present for approximately two months. She first noticed it while driving. The mass is uncomfortable, and she desires to have it removed.    She initially sought treatment from a dermatologist, Dr. Sanchez, in February. Dr. Sanchez attempted to freeze and scrape the mass, but these treatments were ineffective. Following this unsuccessful treatment, Dr. Sanchez referred her to the current clinic.    She reports intermittent numbness and tingling in her left hand, particularly between the thumb and index finger. She describes occasional episodes of hand numbness during sleep. She mentions having to shake it out to alleviate the numbness.    Jyoti reports having had a viral illness during the holiday season (Thanksgiving to January), though she did not have COVID-19 or flu.    Jyoti denies constant numbness or tingling, and denies experiencing numbness or tingling while using her phone. Jyoti denies any atrial fibrillation.    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          Hand/Wrist Musculoskeletal Exam    Inspection    Right      Erythema: none      Ecchymosis: none      Edema: none      Deformity: none    Left      Erythema: none      Ecchymosis: none      Edema: none      Deformity: none    Inspection additional comments: 4 mm  mobile papule with central hyperkeratotic core that she describes as being exquisitely tender.    Range of Motion        Range of motion additional comments: Able to make a composite fist.    Neurovascular    Right       Radial pulse: normal      Capillary refill: brisk      Ulnar nerve sensory distribution: normal      Median nerve sensory distribution: normal      Superficial radial nerve sensory distribution: normal    Left       Radial pulse: normal      Capillary refill: brisk      Ulnar nerve sensory distribution: normal      Median nerve sensory distribution: normal      Superficial radial nerve sensory distribution: normal    Neurovascular additional comments:         Physical Exam                IMAGING  Left hand XR  Personal interpretation of the XR reveals no signs of fractures or dislocations      PLAN  Assessment & Plan    64-year-old female with left thumb mass.  - Excision procedure for mass on patient's finger:  - Excised mass to be sent to pathology for diagnosis confirmation  - Procedure under local anesthesia with moderate sedation  - Post-procedure splint placement  - Avoid full day of computer work for at least 2-3 days after surgery.  - Limit to light computer work for the first 2 weeks after surgery.  - Maintain thumb in splinted position to aid wound healing.  - No lifting, pushing, or pulling for at least 2 weeks after surgery.  - Follow up 2 weeks after surgery for suture removal.         Candido Shaw PA-C, ATC  Hand and Upper Extremity   [facility]    This note was generated with the assistance of ambient listening technology. Verbal consent was obtained by the patient and accompanying visitor(s) for the recording of patient appointment to facilitate this note. I attest to having reviewed and edited the generated note for accuracy, though some syntax or spelling errors may persist. Please contact the author of this note for any clarification.

## 2025-03-05 NOTE — H&P (VIEW-ONLY)
Patient ID: Jyoti Blue is a 64 y.o. female.    Chief Complaint: Pain of the Left Hand    History of Present Illness    CHIEF COMPLAINT:  - Small, hard, painful mass on the left thumb    HPI:  Jyoti presents with a small, hard, painful mass on her left thumb present for approximately two months. She first noticed it while driving. The mass is uncomfortable, and she desires to have it removed.    She initially sought treatment from a dermatologist, Dr. Sanchez, in February. Dr. Sanchez attempted to freeze and scrape the mass, but these treatments were ineffective. Following this unsuccessful treatment, Dr. Sanchez referred her to the current clinic.    She reports intermittent numbness and tingling in her left hand, particularly between the thumb and index finger. She describes occasional episodes of hand numbness during sleep. She mentions having to shake it out to alleviate the numbness.    Jyoti reports having had a viral illness during the holiday season (Thanksgiving to January), though she did not have COVID-19 or flu.    Jyoti denies constant numbness or tingling, and denies experiencing numbness or tingling while using her phone. Jyoti denies any atrial fibrillation.    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          Hand/Wrist Musculoskeletal Exam    Inspection    Right      Erythema: none      Ecchymosis: none      Edema: none      Deformity: none    Left      Erythema: none      Ecchymosis: none      Edema: none      Deformity: none    Inspection additional comments: 4 mm  mobile papule with central hyperkeratotic core that she describes as being exquisitely tender.    Range of Motion        Range of motion additional comments: Able to make a composite fist.    Neurovascular    Right       Radial pulse: normal      Capillary refill: brisk      Ulnar nerve sensory distribution: normal      Median nerve sensory distribution: normal      Superficial radial nerve sensory distribution: normal    Left       Radial pulse: normal      Capillary refill: brisk      Ulnar nerve sensory distribution: normal      Median nerve sensory distribution: normal      Superficial radial nerve sensory distribution: normal    Neurovascular additional comments:         Physical Exam                IMAGING  Left hand XR  Personal interpretation of the XR reveals no signs of fractures or dislocations      PLAN  Assessment & Plan    64-year-old female with left thumb mass.  - Excision procedure for mass on patient's finger:  - Excised mass to be sent to pathology for diagnosis confirmation  - Procedure under local anesthesia with moderate sedation  - Post-procedure splint placement  - Avoid full day of computer work for at least 2-3 days after surgery.  - Limit to light computer work for the first 2 weeks after surgery.  - Maintain thumb in splinted position to aid wound healing.  - No lifting, pushing, or pulling for at least 2 weeks after surgery.  - Follow up 2 weeks after surgery for suture removal.         Candido Shaw PA-C, ATC  Hand and Upper Extremity   [facility]    This note was generated with the assistance of ambient listening technology. Verbal consent was obtained by the patient and accompanying visitor(s) for the recording of patient appointment to facilitate this note. I attest to having reviewed and edited the generated note for accuracy, though some syntax or spelling errors may persist. Please contact the author of this note for any clarification.

## 2025-03-06 RX ORDER — CHLORTHALIDONE 25 MG/1
25 TABLET ORAL DAILY
Qty: 30 TABLET | Refills: 6 | Status: SHIPPED | OUTPATIENT
Start: 2025-03-06

## 2025-03-06 RX ORDER — CHLORTHALIDONE 25 MG/1
25 TABLET ORAL
Qty: 30 TABLET | Refills: 6 | Status: SHIPPED | OUTPATIENT
Start: 2025-03-06 | End: 2025-03-06 | Stop reason: SDUPTHER

## 2025-03-10 ENCOUNTER — RESULTS FOLLOW-UP (OUTPATIENT)
Dept: PRIMARY CARE CLINIC | Facility: CLINIC | Age: 65
End: 2025-03-10

## 2025-03-11 ENCOUNTER — OFFICE VISIT (OUTPATIENT)
Dept: PRIMARY CARE CLINIC | Facility: CLINIC | Age: 65
End: 2025-03-11
Payer: COMMERCIAL

## 2025-03-11 ENCOUNTER — PATIENT OUTREACH (OUTPATIENT)
Dept: ADMINISTRATIVE | Facility: HOSPITAL | Age: 65
End: 2025-03-11
Payer: COMMERCIAL

## 2025-03-11 ENCOUNTER — PATIENT MESSAGE (OUTPATIENT)
Dept: PREADMISSION TESTING | Facility: HOSPITAL | Age: 65
End: 2025-03-11
Payer: COMMERCIAL

## 2025-03-11 VITALS
RESPIRATION RATE: 14 BRPM | DIASTOLIC BLOOD PRESSURE: 68 MMHG | OXYGEN SATURATION: 99 % | HEIGHT: 60 IN | SYSTOLIC BLOOD PRESSURE: 116 MMHG | BODY MASS INDEX: 36.94 KG/M2 | HEART RATE: 77 BPM

## 2025-03-11 DIAGNOSIS — M25.561 RECURRENT PAIN OF RIGHT KNEE: ICD-10-CM

## 2025-03-11 DIAGNOSIS — Z23 NEED FOR VACCINATION: ICD-10-CM

## 2025-03-11 DIAGNOSIS — E55.9 VITAMIN D DEFICIENCY: ICD-10-CM

## 2025-03-11 DIAGNOSIS — J06.9 RECURRENT URI (UPPER RESPIRATORY INFECTION): ICD-10-CM

## 2025-03-11 DIAGNOSIS — D48.5 NEOPLASM OF UNCERTAIN BEHAVIOR OF SKIN: Primary | ICD-10-CM

## 2025-03-11 DIAGNOSIS — R73.03 PREDIABETES: ICD-10-CM

## 2025-03-11 DIAGNOSIS — E78.5 HYPERLIPIDEMIA, UNSPECIFIED HYPERLIPIDEMIA TYPE: ICD-10-CM

## 2025-03-11 DIAGNOSIS — Z00.00 ANNUAL PHYSICAL EXAM: ICD-10-CM

## 2025-03-11 DIAGNOSIS — Z23 NEED FOR PNEUMOCOCCAL 20-VALENT CONJUGATE VACCINATION: ICD-10-CM

## 2025-03-11 DIAGNOSIS — R52 PAIN: ICD-10-CM

## 2025-03-11 DIAGNOSIS — E66.01 CLASS 2 SEVERE OBESITY DUE TO EXCESS CALORIES WITH SERIOUS COMORBIDITY AND BODY MASS INDEX (BMI) OF 36.0 TO 36.9 IN ADULT: ICD-10-CM

## 2025-03-11 DIAGNOSIS — E66.812 CLASS 2 SEVERE OBESITY DUE TO EXCESS CALORIES WITH SERIOUS COMORBIDITY AND BODY MASS INDEX (BMI) OF 36.0 TO 36.9 IN ADULT: ICD-10-CM

## 2025-03-11 DIAGNOSIS — I10 PRIMARY HYPERTENSION: Primary | ICD-10-CM

## 2025-03-11 PROCEDURE — 90656 IIV3 VACC NO PRSV 0.5 ML IM: CPT | Mod: S$GLB,,, | Performed by: FAMILY MEDICINE

## 2025-03-11 PROCEDURE — G0008 ADMIN INFLUENZA VIRUS VAC: HCPCS | Mod: S$GLB,,, | Performed by: FAMILY MEDICINE

## 2025-03-11 PROCEDURE — 1160F RVW MEDS BY RX/DR IN RCRD: CPT | Mod: CPTII,S$GLB,, | Performed by: FAMILY MEDICINE

## 2025-03-11 PROCEDURE — G0009 ADMIN PNEUMOCOCCAL VACCINE: HCPCS | Mod: S$GLB,,, | Performed by: FAMILY MEDICINE

## 2025-03-11 PROCEDURE — 99999 PR PBB SHADOW E&M-EST. PATIENT-LVL V: CPT | Mod: PBBFAC,,, | Performed by: FAMILY MEDICINE

## 2025-03-11 PROCEDURE — 3078F DIAST BP <80 MM HG: CPT | Mod: CPTII,S$GLB,, | Performed by: FAMILY MEDICINE

## 2025-03-11 PROCEDURE — 3074F SYST BP LT 130 MM HG: CPT | Mod: CPTII,S$GLB,, | Performed by: FAMILY MEDICINE

## 2025-03-11 PROCEDURE — 99214 OFFICE O/P EST MOD 30 MIN: CPT | Mod: S$GLB,,, | Performed by: FAMILY MEDICINE

## 2025-03-11 PROCEDURE — 3008F BODY MASS INDEX DOCD: CPT | Mod: CPTII,S$GLB,, | Performed by: FAMILY MEDICINE

## 2025-03-11 PROCEDURE — 3044F HG A1C LEVEL LT 7.0%: CPT | Mod: CPTII,S$GLB,, | Performed by: FAMILY MEDICINE

## 2025-03-11 PROCEDURE — 90677 PCV20 VACCINE IM: CPT | Mod: S$GLB,,, | Performed by: FAMILY MEDICINE

## 2025-03-11 PROCEDURE — 1159F MED LIST DOCD IN RCRD: CPT | Mod: CPTII,S$GLB,, | Performed by: FAMILY MEDICINE

## 2025-03-11 RX ORDER — MELOXICAM 15 MG/1
15 TABLET ORAL DAILY PRN
Qty: 30 TABLET | Refills: 0 | Status: SHIPPED | OUTPATIENT
Start: 2025-03-11

## 2025-03-11 RX ORDER — CHLORTHALIDONE 25 MG/1
25 TABLET ORAL DAILY
Qty: 90 TABLET | Refills: 1 | Status: SHIPPED | OUTPATIENT
Start: 2025-03-11

## 2025-03-11 RX ORDER — ATORVASTATIN CALCIUM 80 MG/1
80 TABLET, FILM COATED ORAL DAILY
Qty: 90 TABLET | Refills: 1 | Status: SHIPPED | OUTPATIENT
Start: 2025-03-11

## 2025-03-11 RX ORDER — METFORMIN HYDROCHLORIDE 500 MG/1
500 TABLET, EXTENDED RELEASE ORAL 2 TIMES DAILY WITH MEALS
Qty: 180 TABLET | Refills: 1 | Status: SHIPPED | OUTPATIENT
Start: 2025-03-11

## 2025-03-11 RX ORDER — DILTIAZEM HYDROCHLORIDE 180 MG/1
180 CAPSULE, EXTENDED RELEASE ORAL DAILY
Qty: 90 CAPSULE | Refills: 1 | Status: SHIPPED | OUTPATIENT
Start: 2025-03-11

## 2025-03-11 NOTE — PROGRESS NOTES
Subjective:       Patient ID: Jyoti Blue is a 64 y.o. female.    Chief Complaint: Hypertension, Pre-diabetes, and Follow-up    Hypertension    Follow-up    65 y/o female with HLPD, OA of R knee, CLBP, Vit D Def, Prediabetes, Diverticulosis, hx of Colon polyps, PVC's,HTN, hx of Covid 1/2021 is here for HTN follow up.    Since last visit she was seen in UC in Nov, Dec and Jan for URI sx, since then she feels better but she notes in the mornings sometimes she has lots of green mucus and occasionally she coughs up green sputum, occurs about 3 days a week, she denies sneezing/itchy eyes/sinus pressure/sore throat/ear pain/n/v/heartburn/cp/sob/d/constipation/urinary sx.  She walks a lot at work, no dedicated exercise. She is trying to eat healthy. weight down 6 pounds. She is sleeping well.  Mood good.      Hx of Covid-19 1/2021  PCV's/Palpitations/HTN/HLPD/Mild Carotid disease: following with Cardiology, Diltiazem 180 mg daily,repeat Carotid US in 2024; asa 81 mg daily, Pravastatin 80 mg daily  ENT Dr. Terrell, gets cerumen impaction and has to get her ears cleaned every 6 months  R knee pain on and off for 10 years: pain is achy and comes and goes, she denies knee swelling, she has been to ortho and had multiple injections, she repots taking Naproxen or Ibuprofen or Mobic as needed, using a few times a month  GYN: followed by Dr. Quita Bess, pelvic utd, mmg DIS utd will request report   Prediabetes: metformin  mg daily; A1c 6.1 3/2025  Vitamin D def: Vitamin D3 5,000 IU daily gummies  Colonoscopy 6/2023 diverticulosis, hemorrhoids, colon polyps, repeat 7 years  Eye exam utd  Dental utd    Labs 3/2025 reviewed, she will increase her metformin to  500 mg bid as she has only been on once a day; vit d still low on 5,000 IU gummie, willing to try tablet or capsule      Review of Systems  see HPI  Objective:      /68 (BP Location: Right arm, Patient Position: Sitting)   Pulse 77   Resp 14   Ht 5'  (1.524 m)   SpO2 99%   BMI 36.94 kg/m²   Physical Exam  Vitals and nursing note reviewed.   Constitutional:       Appearance: She is well-developed.   HENT:      Head: Normocephalic and atraumatic.      Mouth/Throat:      Pharynx: No oropharyngeal exudate or posterior oropharyngeal erythema.   Neck:      Thyroid: No thyromegaly.   Cardiovascular:      Rate and Rhythm: Normal rate and regular rhythm.      Heart sounds: Normal heart sounds.   Pulmonary:      Effort: Pulmonary effort is normal. No respiratory distress.      Breath sounds: Normal breath sounds.   Musculoskeletal:      Cervical back: Normal range of motion and neck supple.      Right lower leg: No edema.      Left lower leg: No edema.   Lymphadenopathy:      Cervical: No cervical adenopathy.   Skin:     General: Skin is warm and dry.   Neurological:      Mental Status: She is alert.         Assessment:       1. Primary hypertension    2. Hyperlipidemia, unspecified hyperlipidemia type    3. Prediabetes    4. Need for pneumococcal 20-valent conjugate vaccination    5. Annual physical exam    6. Class 2 severe obesity due to excess calories with serious comorbidity and body mass index (BMI) of 36.0 to 36.9 in adult    7. Vitamin D deficiency    8. Recurrent pain of right knee    9. Recurrent URI (upper respiratory infection)    10. Need for vaccination        Plan:   Jyoti was seen today for hypertension, pre-diabetes and follow-up.    Diagnoses and all orders for this visit:    Primary hypertension  -     chlorthalidone (HYGROTEN) 25 MG Tab; Take 1 tablet (25 mg total) by mouth once daily.  -     diltiaZEM (DILT-XR) 180 MG CDCR; Take 1 capsule (180 mg total) by mouth once daily.  -     CBC Auto Differential; Future  -     Comprehensive Metabolic Panel; Future  -     Hemoglobin A1C; Future  -     Lipid Panel; Future    Hyperlipidemia, unspecified hyperlipidemia type  -     atorvastatin (LIPITOR) 80 MG tablet; Take 1 tablet (80 mg total) by mouth once  daily.  -     chlorthalidone (HYGROTEN) 25 MG Tab; Take 1 tablet (25 mg total) by mouth once daily.  -     diltiaZEM (DILT-XR) 180 MG CDCR; Take 1 capsule (180 mg total) by mouth once daily.  -     CBC Auto Differential; Future  -     Comprehensive Metabolic Panel; Future  -     Hemoglobin A1C; Future  -     Lipid Panel; Future    Prediabetes  -     metFORMIN (GLUCOPHAGE-XR) 500 MG ER 24hr tablet; Take 1 tablet (500 mg total) by mouth 2 (two) times daily with meals.  -     CBC Auto Differential; Future  -     Comprehensive Metabolic Panel; Future  -     Hemoglobin A1C; Future  -     Vitamin B12; Future    Need for pneumococcal 20-valent conjugate vaccination  -     pneumoc 20-aleksander conj-dip cr(PF) (PREVNAR-20 (PF)) injection Syrg 0.5 mL    Annual physical exam  -     CBC Auto Differential; Future  -     Comprehensive Metabolic Panel; Future  -     Hemoglobin A1C; Future  -     Lipid Panel; Future  -     TSH; Future  -     Vitamin D; Future  -     Vitamin B12; Future    Class 2 severe obesity due to excess calories with serious comorbidity and body mass index (BMI) of 36.0 to 36.9 in adult  -     CBC Auto Differential; Future  -     Comprehensive Metabolic Panel; Future  -     Hemoglobin A1C; Future  -     Lipid Panel; Future  -     Vitamin D; Future    Vitamin D deficiency  -     Vitamin D; Future    Recurrent pain of right knee  -     meloxicam (MOBIC) 15 MG tablet; Take 1 tablet (15 mg total) by mouth daily as needed for Pain.    Recurrent URI (upper respiratory infection)  -     Ambulatory referral/consult to Allergy; Future    Need for vaccination  -     Influenza - Trivalent - PF (ADULT)

## 2025-03-12 NOTE — ANESTHESIA PAT ROS NOTE
03/12/2025  Jyoti Blue is a 64 y.o., female.      Pre-op Assessment    I have reviewed the Patient Summary Reports.       I have reviewed the Medications.     Review of Systems  Anesthesia Hx:  No problems with previous Anesthesia   History of prior surgery of interest to airway management or planning:  Previous anesthesia: MAC       Colonoscopy 6/23/23 with MAC.  Procedure performed at an [facility].     Social:  Alcohol Use, Former Smoker Quit smoking 1987      Hematology/Oncology:       -- Anemia:                                  Cardiovascular:     Hypertension           hyperlipidemia    Bilat carotid artery disease  PVC's - controlled on diltiazem                           Musculoskeletal:  Arthritis   Neoplasm of uncertain behavior of skin       Spine Disorders: lumbar Chronic Pain           Endocrine:     Prediabetes      Obesity / BMI > 30       Past Medical History:   Diagnosis Date    Allergy     Anemia     Hyperlipidemia      Past Surgical History:   Procedure Laterality Date    BREAST SURGERY      L sided lumpectomy, was told it was benign    CATARACT EXTRACTION      CATARACT EXTRACTION W/  INTRAOCULAR LENS IMPLANT Right 11/22/2021    Procedure: EXTRACTION, CATARACT, WITH IOL INSERTION;  Surgeon: Bonnie Choe MD;  Location: [facility] OR;  Service: Ophthalmology;  Laterality: Right;  Right    CATARACT EXTRACTION W/  INTRAOCULAR LENS IMPLANT Left 2/7/2022    Procedure: EXTRACTION, CATARACT, WITH IOL INSERTION;  Surgeon: Bonnie Choe MD;  Location: [facility] OR;  Service: Ophthalmology;  Laterality: Left;  LEFT    COLONOSCOPY N/A 6/23/2023    Procedure: COLONOSCOPY;  Surgeon: Rudy Ugalde MD;  Location: [address]);  Service: Endoscopy;  Laterality: N/A;  referred by / trish rivas [id number]  pre call 6/19, pt need prep prescription to be send again -  mf    DILATION AND CURETTAGE OF UTERUS      EYE SURGERY      R eye cataract    ovary removed       Anesthesia Assessment: Preoperative EQUATION    Planned Procedure: Procedure(s) (LRB):  LEFT THUMB LESION EXCISION REMOVAL (Left)  Requested Anesthesia Type:Local MAC  Surgeon: Lucinda William MD  Service: Orthopedics  Known or anticipated Date of Surgery:3/24/2025    Surgeon notes: reviewed    Electronic QUestionnaire Assessment completed via nurse interview with patient.      Triage considerations:     The patient has no apparent active cardiac condition (No unstable coronary Syndrome such as severe unstable angina or recent [<1 month] myocardial infarction, decompensated CHF, severe valvular   disease or significant arrhythmia)    Previous anesthesia records:MAC and No problems    Last PCP note: within 1 month   Subspecialty notes: Cardiology: General, Dermatology, Ortho    Other important co-morbidities: HLD, HTN, and Obesity   EKG 2/12/21  Vent. Rate : 069 BPM     Atrial Rate : 069 BPM      P-R Int : 194 ms          QRS Dur : 078 ms       QT Int : 380 ms       P-R-T Axes : 070 072 070 degrees      QTc Int : 407 ms     Normal sinus rhythm   Minimal voltage criteria for LVH, may be normal variant   Borderline Abnormal ECG   When compared with ECG of 09-AUG-2016 10:17,   Previous ECG has undetermined rhythm, needs review   Confirmed by Esme Flores MD ([id number]) on 2/14/2021 6:02:56 PM     Stress Echo 3/9/21  The test was stopped because the patient experienced fatigue.  During stress, the following significant arrhythmias were observed: frequent PVCs.  The patient's exercise capacity was mildly impaired.  The ECG portion of this study is positive for myocardial ischemia.  The left ventricle is normal in size with normal systolic function. The estimated ejection fraction is 63%  Normal left ventricular diastolic function.  Normal right ventricular size with normal right ventricular systolic function.  The  "stress echo portion of this study is negative for myocardial ischemia.  LVEF increased to upper 60s and there were no RWMAs despite the abnormal stress ECG ( officially "False Positive") and the HTN BP response to exercise, which reduces the specificity of the echo .     Tests already available:  Results have been reviewed.             Instructions given. (See in Nurse's note) Pre op medication instructions sent via portal message.    Optimization:  Anesthesia Preop Clinic Assessment not Indicated    Medical Opinion Indicated: no       Sub-specialist consult indicated: no       Plan:   No pre op medical clearance requested.    Navigation:  Straight Line to surgery.               No tests, anesthesia preop clinic visit, or consult required.    Ht: 5'  Wt: 85.8 kg (189 lb)  BMI: 36.94  "

## 2025-03-18 RX ORDER — TRAMADOL HYDROCHLORIDE 50 MG/1
50 TABLET ORAL EVERY 6 HOURS PRN
Qty: 10 TABLET | Refills: 0 | Status: SHIPPED | OUTPATIENT
Start: 2025-03-18

## 2025-03-20 NOTE — PLAN OF CARE
Pt requested a call from RN via portal message.  RN called pt back [phone].  Pt wanted to know if taking 2 advil a couple of days ago was OK.  RN instructed that she can take OTC strength advil up to 24 hours prior to surgery.  Instructed pt that surgeon's office will call her the day before surgery to notify her of arrival time.  Pt verbalized understanding.    Jordyn Yan RN BSN

## 2025-03-21 ENCOUNTER — TELEPHONE (OUTPATIENT)
Dept: ORTHOPEDICS | Facility: CLINIC | Age: 65
End: 2025-03-21
Payer: COMMERCIAL

## 2025-03-21 NOTE — TELEPHONE ENCOUNTER
Spoke c pt. Informed pt of a  7:15 a.m. arrival time for surgery at the [facility]. Confirmed no scratches or open wounds on their hands. Reminded pt of NPO status & PO appt. Pt expressed understanding & was thankful.

## 2025-03-24 ENCOUNTER — ANESTHESIA (OUTPATIENT)
Dept: SURGERY | Facility: HOSPITAL | Age: 65
End: 2025-03-24
Payer: COMMERCIAL

## 2025-03-24 ENCOUNTER — HOSPITAL ENCOUNTER (OUTPATIENT)
Facility: HOSPITAL | Age: 65
Discharge: HOME | End: 2025-03-24
Attending: ORTHOPAEDIC SURGERY | Admitting: ORTHOPAEDIC SURGERY
Payer: COMMERCIAL

## 2025-03-24 ENCOUNTER — ANESTHESIA EVENT (OUTPATIENT)
Dept: SURGERY | Facility: HOSPITAL | Age: 65
End: 2025-03-24
Payer: COMMERCIAL

## 2025-03-24 VITALS
HEART RATE: 63 BPM | BODY MASS INDEX: 37.11 KG/M2 | HEIGHT: 60 IN | OXYGEN SATURATION: 99 % | RESPIRATION RATE: 18 BRPM | WEIGHT: 189 LBS | TEMPERATURE: 98 F | SYSTOLIC BLOOD PRESSURE: 161 MMHG | DIASTOLIC BLOOD PRESSURE: 69 MMHG

## 2025-03-24 DIAGNOSIS — R52 PAIN: ICD-10-CM

## 2025-03-24 DIAGNOSIS — Z00.00 ANNUAL PHYSICAL EXAM: ICD-10-CM

## 2025-03-24 DIAGNOSIS — D48.5 NEOPLASM OF UNCERTAIN BEHAVIOR OF SKIN: ICD-10-CM

## 2025-03-24 DIAGNOSIS — R22.32 MASS OF SKIN OF LEFT THUMB: Primary | ICD-10-CM

## 2025-03-24 DIAGNOSIS — R73.03 PREDIABETES: ICD-10-CM

## 2025-03-24 LAB — POCT GLUCOSE: 103 MG/DL (ref 70–110)

## 2025-03-24 PROCEDURE — 71000033 HC RECOVERY, INTIAL HOUR: Performed by: ORTHOPAEDIC SURGERY

## 2025-03-24 PROCEDURE — 25000003 PHARM REV CODE 250

## 2025-03-24 PROCEDURE — 82962 GLUCOSE BLOOD TEST: CPT | Performed by: ORTHOPAEDIC SURGERY

## 2025-03-24 PROCEDURE — 25000003 PHARM REV CODE 250: Performed by: PHYSICIAN ASSISTANT

## 2025-03-24 PROCEDURE — 71000015 HC POSTOP RECOV 1ST HR: Performed by: ORTHOPAEDIC SURGERY

## 2025-03-24 PROCEDURE — 99900035 HC TECH TIME PER 15 MIN (STAT)

## 2025-03-24 PROCEDURE — 63600175 PHARM REV CODE 636 W HCPCS: Performed by: NURSE ANESTHETIST, CERTIFIED REGISTERED

## 2025-03-24 PROCEDURE — 36000706: Performed by: ORTHOPAEDIC SURGERY

## 2025-03-24 PROCEDURE — 63600175 PHARM REV CODE 636 W HCPCS: Performed by: ORTHOPAEDIC SURGERY

## 2025-03-24 PROCEDURE — 37000008 HC ANESTHESIA 1ST 15 MINUTES: Performed by: ORTHOPAEDIC SURGERY

## 2025-03-24 PROCEDURE — 37000009 HC ANESTHESIA EA ADD 15 MINS: Performed by: ORTHOPAEDIC SURGERY

## 2025-03-24 PROCEDURE — 25000003 PHARM REV CODE 250: Performed by: NURSE ANESTHETIST, CERTIFIED REGISTERED

## 2025-03-24 PROCEDURE — 36000707: Performed by: ORTHOPAEDIC SURGERY

## 2025-03-24 PROCEDURE — 25000003 PHARM REV CODE 250: Performed by: ORTHOPAEDIC SURGERY

## 2025-03-24 PROCEDURE — 94761 N-INVAS EAR/PLS OXIMETRY MLT: CPT

## 2025-03-24 PROCEDURE — 88305 TISSUE EXAM BY PATHOLOGIST: CPT | Mod: TC | Performed by: ORTHOPAEDIC SURGERY

## 2025-03-24 RX ORDER — SODIUM CHLORIDE 0.9 % (FLUSH) 0.9 %
10 SYRINGE (ML) INJECTION
Status: DISCONTINUED | OUTPATIENT
Start: 2025-03-24 | End: 2025-03-24 | Stop reason: HOSPADM

## 2025-03-24 RX ORDER — ONDANSETRON HYDROCHLORIDE 2 MG/ML
4 INJECTION, SOLUTION INTRAVENOUS DAILY PRN
Status: DISCONTINUED | OUTPATIENT
Start: 2025-03-24 | End: 2025-03-24 | Stop reason: HOSPADM

## 2025-03-24 RX ORDER — GLUCAGON 1 MG
1 KIT INJECTION
Status: DISCONTINUED | OUTPATIENT
Start: 2025-03-24 | End: 2025-03-24 | Stop reason: HOSPADM

## 2025-03-24 RX ORDER — BACITRACIN ZINC 500 UNIT/G
OINTMENT (GRAM) TOPICAL
Status: DISCONTINUED | OUTPATIENT
Start: 2025-03-24 | End: 2025-03-24 | Stop reason: HOSPADM

## 2025-03-24 RX ORDER — ONDANSETRON HYDROCHLORIDE 2 MG/ML
INJECTION, SOLUTION INTRAVENOUS
Status: DISCONTINUED | OUTPATIENT
Start: 2025-03-24 | End: 2025-03-24

## 2025-03-24 RX ORDER — FAMOTIDINE 10 MG/ML
INJECTION, SOLUTION INTRAVENOUS
Status: DISCONTINUED | OUTPATIENT
Start: 2025-03-24 | End: 2025-03-24

## 2025-03-24 RX ORDER — HALOPERIDOL LACTATE 5 MG/ML
0.5 INJECTION, SOLUTION INTRAMUSCULAR EVERY 10 MIN PRN
Status: DISCONTINUED | OUTPATIENT
Start: 2025-03-24 | End: 2025-03-24 | Stop reason: HOSPADM

## 2025-03-24 RX ORDER — CEFAZOLIN 2 G/1
INJECTION, POWDER, FOR SOLUTION INTRAMUSCULAR; INTRAVENOUS
Status: DISCONTINUED | OUTPATIENT
Start: 2025-03-24 | End: 2025-03-24

## 2025-03-24 RX ORDER — BUPIVACAINE HYDROCHLORIDE 2.5 MG/ML
INJECTION, SOLUTION EPIDURAL; INFILTRATION; INTRACAUDAL; PERINEURAL
Status: DISCONTINUED | OUTPATIENT
Start: 2025-03-24 | End: 2025-03-24 | Stop reason: HOSPADM

## 2025-03-24 RX ORDER — CEFAZOLIN 2 G/1
2 INJECTION, POWDER, FOR SOLUTION INTRAMUSCULAR; INTRAVENOUS
Status: DISCONTINUED | OUTPATIENT
Start: 2025-03-24 | End: 2025-03-24 | Stop reason: HOSPADM

## 2025-03-24 RX ORDER — PROPOFOL 10 MG/ML
VIAL (ML) INTRAVENOUS CONTINUOUS PRN
Status: DISCONTINUED | OUTPATIENT
Start: 2025-03-24 | End: 2025-03-24

## 2025-03-24 RX ORDER — LIDOCAINE HYDROCHLORIDE 20 MG/ML
INJECTION INTRAVENOUS
Status: DISCONTINUED | OUTPATIENT
Start: 2025-03-24 | End: 2025-03-24

## 2025-03-24 RX ORDER — FENTANYL CITRATE 50 UG/ML
INJECTION, SOLUTION INTRAMUSCULAR; INTRAVENOUS
Status: DISCONTINUED | OUTPATIENT
Start: 2025-03-24 | End: 2025-03-24

## 2025-03-24 RX ORDER — MIDAZOLAM HYDROCHLORIDE 1 MG/ML
INJECTION INTRAMUSCULAR; INTRAVENOUS
Status: DISCONTINUED | OUTPATIENT
Start: 2025-03-24 | End: 2025-03-24

## 2025-03-24 RX ORDER — DEXAMETHASONE SODIUM PHOSPHATE 4 MG/ML
INJECTION, SOLUTION INTRA-ARTICULAR; INTRALESIONAL; INTRAMUSCULAR; INTRAVENOUS; SOFT TISSUE
Status: DISCONTINUED | OUTPATIENT
Start: 2025-03-24 | End: 2025-03-24

## 2025-03-24 RX ORDER — MUPIROCIN 20 MG/G
OINTMENT TOPICAL
Status: DISCONTINUED | OUTPATIENT
Start: 2025-03-24 | End: 2025-03-24 | Stop reason: HOSPADM

## 2025-03-24 RX ORDER — ACETAMINOPHEN 500 MG
1000 TABLET ORAL
Status: COMPLETED | OUTPATIENT
Start: 2025-03-24 | End: 2025-03-24

## 2025-03-24 RX ORDER — METHOCARBAMOL 500 MG/1
1000 TABLET, FILM COATED ORAL ONCE
Status: DISCONTINUED | OUTPATIENT
Start: 2025-03-24 | End: 2025-03-24 | Stop reason: HOSPADM

## 2025-03-24 RX ORDER — LIDOCAINE HYDROCHLORIDE 10 MG/ML
INJECTION, SOLUTION EPIDURAL; INFILTRATION; INTRACAUDAL; PERINEURAL
Status: DISCONTINUED | OUTPATIENT
Start: 2025-03-24 | End: 2025-03-24 | Stop reason: HOSPADM

## 2025-03-24 RX ORDER — SODIUM CHLORIDE 9 MG/ML
INJECTION, SOLUTION INTRAVENOUS CONTINUOUS
Status: DISCONTINUED | OUTPATIENT
Start: 2025-03-24 | End: 2025-03-24 | Stop reason: HOSPADM

## 2025-03-24 RX ADMIN — CEFAZOLIN 2 G: 2 INJECTION, POWDER, FOR SOLUTION INTRAMUSCULAR; INTRAVENOUS at 09:03

## 2025-03-24 RX ADMIN — DEXAMETHASONE SODIUM PHOSPHATE 8 MG: 4 INJECTION, SOLUTION INTRAMUSCULAR; INTRAVENOUS at 09:03

## 2025-03-24 RX ADMIN — MUPIROCIN: 20 OINTMENT TOPICAL at 07:03

## 2025-03-24 RX ADMIN — SODIUM CHLORIDE: 9 INJECTION, SOLUTION INTRAVENOUS at 09:03

## 2025-03-24 RX ADMIN — ONDANSETRON 4 MG: 2 INJECTION INTRAMUSCULAR; INTRAVENOUS at 09:03

## 2025-03-24 RX ADMIN — LIDOCAINE HYDROCHLORIDE 100 MG: 20 INJECTION INTRAVENOUS at 09:03

## 2025-03-24 RX ADMIN — SODIUM CHLORIDE: 9 INJECTION, SOLUTION INTRAVENOUS at 07:03

## 2025-03-24 RX ADMIN — FAMOTIDINE 20 MG: 10 INJECTION, SOLUTION INTRAVENOUS at 09:03

## 2025-03-24 RX ADMIN — MIDAZOLAM HYDROCHLORIDE 2 MG: 2 INJECTION, SOLUTION INTRAMUSCULAR; INTRAVENOUS at 09:03

## 2025-03-24 RX ADMIN — ACETAMINOPHEN 1000 MG: 500 TABLET ORAL at 07:03

## 2025-03-24 RX ADMIN — PROPOFOL 100 MCG/KG/MIN: 10 INJECTION, EMULSION INTRAVENOUS at 09:03

## 2025-03-24 RX ADMIN — FENTANYL CITRATE 25 MCG: 50 INJECTION, SOLUTION INTRAMUSCULAR; INTRAVENOUS at 09:03

## 2025-03-24 NOTE — INTERVAL H&P NOTE
The patient has been examined and the H&P has been reviewed:    I concur with the findings and no changes have occurred since H&P was written.    Surgery risks, benefits and alternative options discussed and understood by patient/family.          Active Hospital Problems    Diagnosis  POA    *Neoplasm of uncertain behavior of skin [D48.5]  Yes      Resolved Hospital Problems   No resolved problems to display.

## 2025-03-24 NOTE — PLAN OF CARE
Pre op complete. Pt resting comfortably. Call light in reach. Family at bedside.   Need anesth consent, site zane, and updated H&P.

## 2025-03-24 NOTE — ANESTHESIA PREPROCEDURE EVALUATION
03/24/2025  Jyoti Blue is a 64 y.o., female.      Pre-op Assessment    I have reviewed the Patient Summary Reports.     I have reviewed the Nursing Notes. I have reviewed the NPO Status.   I have reviewed the Medications.     Review of Systems  Anesthesia Hx:  No problems with previous Anesthesia   History of prior surgery of interest to airway management or planning:  Previous anesthesia: MAC       Colonoscopy 6/23/23 with MAC.  Procedure performed at an [facility].  Denies Family Hx of Anesthesia complications.    Denies Personal Hx of Anesthesia complications.                    Social:  Alcohol Use, Former Smoker Quit smoking 1987      Hematology/Oncology:       -- Anemia:                                  Cardiovascular:     Hypertension           hyperlipidemia    Bilat carotid artery disease  PVC's - controlled on diltiazem                           Musculoskeletal:  Arthritis   Neoplasm of uncertain behavior of skin       Spine Disorders: lumbar Chronic Pain           Endocrine:     Prediabetes      Obesity / BMI > 30      Physical Exam    Airway:  Mallampati: II / II  Mouth Opening: Normal  TM Distance: Normal  Tongue: Normal  Neck ROM: Normal ROM    Dental:  Intact  Past Medical History:   Diagnosis Date    Allergy     Anemia     Hyperlipidemia      Past Surgical History:   Procedure Laterality Date    BREAST SURGERY      L sided lumpectomy, was told it was benign    CATARACT EXTRACTION      CATARACT EXTRACTION W/  INTRAOCULAR LENS IMPLANT Right 11/22/2021    Procedure: EXTRACTION, CATARACT, WITH IOL INSERTION;  Surgeon: Bonnie Choe MD;  Location: [facility] OR;  Service: Ophthalmology;  Laterality: Right;  Right    CATARACT EXTRACTION W/  INTRAOCULAR LENS IMPLANT Left 2/7/2022    Procedure: EXTRACTION, CATARACT, WITH IOL INSERTION;  Surgeon: Bonnie Choe MD;  Location:  [facility] OR;  Service: Ophthalmology;  Laterality: Left;  LEFT    COLONOSCOPY N/A 6/23/2023    Procedure: COLONOSCOPY;  Surgeon: Rudy Ugalde MD;  Location: [facility] ([address] [place] [address]);  Service: Endoscopy;  Laterality: N/A;  referred by / trish rivas [id number]  pre call 6/19, pt need prep prescription to be send again - mf    DILATION AND CURETTAGE OF UTERUS      EYE SURGERY      R eye cataract    ovary removed       Anesthesia Assessment: Preoperative EQUATION    Planned Procedure: Procedure(s) (LRB):  LEFT THUMB LESION EXCISION REMOVAL (Left)  Requested Anesthesia Type:Local MAC  Surgeon: Lucinda William MD  Service: Orthopedics  Known or anticipated Date of Surgery:3/24/2025    Surgeon notes: reviewed    Electronic QUestionnaire Assessment completed via nurse interview with patient.      Triage considerations:     The patient has no apparent active cardiac condition (No unstable coronary Syndrome such as severe unstable angina or recent [<1 month] myocardial infarction, decompensated CHF, severe valvular   disease or significant arrhythmia)    Previous anesthesia records:MAC and No problems    Last PCP note: within 1 month   Subspecialty notes: Cardiology: General, Dermatology, Ortho    Other important co-morbidities: HLD, HTN, and Obesity   EKG 2/12/21  Vent. Rate : 069 BPM     Atrial Rate : 069 BPM      P-R Int : 194 ms          QRS Dur : 078 ms       QT Int : 380 ms       P-R-T Axes : 070 072 070 degrees      QTc Int : 407 ms     Normal sinus rhythm   Minimal voltage criteria for LVH, may be normal variant   Borderline Abnormal ECG   When compared with ECG of 09-AUG-2016 10:17,   Previous ECG has undetermined rhythm, needs review   Confirmed by Esme Flores MD ([id number]) on 2/14/2021 6:02:56 PM     Stress Echo 3/9/21  The test was stopped because the patient experienced fatigue.  During stress, the following significant arrhythmias were observed: frequent PVCs.  The patient's  "exercise capacity was mildly impaired.  The ECG portion of this study is positive for myocardial ischemia.  The left ventricle is normal in size with normal systolic function. The estimated ejection fraction is 63%  Normal left ventricular diastolic function.  Normal right ventricular size with normal right ventricular systolic function.  The stress echo portion of this study is negative for myocardial ischemia.  LVEF increased to upper 60s and there were no RWMAs despite the abnormal stress ECG ( officially "False Positive") and the HTN BP response to exercise, which reduces the specificity of the echo .     Tests already available:  Results have been reviewed.             Instructions given. (See in Nurse's note) Pre op medication instructions sent via portal message.    Optimization:  Anesthesia Preop Clinic Assessment not Indicated    Medical Opinion Indicated: no       Sub-specialist consult indicated: no       Plan:   No pre op medical clearance requested.    Navigation:  Straight Line to surgery.               No tests, anesthesia preop clinic visit, or consult required.    Ht: 5'  Wt: 85.8 kg (189 lb)  BMI: 36.94    Anesthesia Plan  Type of Anesthesia, risks & benefits discussed:    Anesthesia Type: Gen Natural Airway  Intra-op Monitoring Plan: Standard ASA Monitors  Post Op Pain Control Plan: multimodal analgesia and IV/PO Opioids PRN  Induction:  IV  Informed Consent: Informed consent signed with the Patient and all parties understand the risks and agree with anesthesia plan.  All questions answered.   ASA Score: 2    Ready For Surgery From Anesthesia Perspective.   .  "

## 2025-03-24 NOTE — ANESTHESIA POSTPROCEDURE EVALUATION
Anesthesia Post Evaluation    Patient: Jyoti Blue    Procedure(s) Performed: Procedure(s) (LRB):  LEFT THUMB LESION EXCISION REMOVAL (Left)    Final Anesthesia Type: general      Patient location during evaluation: PACU  Patient participation: Yes- Able to Participate  Level of consciousness: awake and alert and oriented  Post-procedure vital signs: reviewed and stable  Pain management: adequate  Airway patency: patent    PONV status at discharge: No PONV  Anesthetic complications: no      Cardiovascular status: hemodynamically stable  Respiratory status: nasal cannula  Hydration status: euvolemic  Follow-up not needed.          Vitals Value Taken Time   /69 03/24/25 10:15   Temp 36.4 °C (97.5 °F) 03/24/25 09:41   Pulse 63 03/24/25 10:12   Resp 18 03/24/25 10:12   SpO2 99 % 03/24/25 10:12         Event Time   Out of Recovery 10:12:00         Pain/Ke Score: Pain Rating Prior to Med Admin: 0 (3/24/2025  7:39 AM)  Ke Score: 10 (3/24/2025 10:00 AM)

## 2025-03-24 NOTE — BRIEF OP NOTE
[place] - Surgery ([facility])  Brief Operative Note    Surgery Date: 3/24/2025     Surgeons and Role:     * Lucinda William MD - Primary    Assisting Surgeon: None    Pre-op Diagnosis:  Neoplasm of uncertain behavior of skin [D48.5]  Pain [R52]    Post-op Diagnosis:  Post-Op Diagnosis Codes:     * Neoplasm of uncertain behavior of skin [D48.5]     * Pain [R52]    Procedure(s) (LRB):  LEFT THUMB LESION EXCISION REMOVAL (Left)    Anesthesia: Local MAC    Operative Findings: See full op note    Estimated Blood Loss: 0 mL         Specimens:   Specimen (24h ago, onward)       Start     Ordered    03/24/25 0929  Specimen to Pathology Orthopedics  Once        Comments: Left thumb mass     References:    Click here for ordering Quick Tip   Question Answer Comment   Service Line: Orthopedics    Specimen Source Thumb, Left Hand    Procedure Type: Excision    Release to patient Immediate        03/24/25 0928                    ID Type Source Tests Collected by Time Destination   1 :  Tissue Thumb, Left Hand SPECIMEN TO PATHOLOGY Lucinda William MD 3/24/2025 0930            Discharge Note    OUTCOME: Patient tolerated treatment/procedure well without complication and is now ready for discharge.    DISPOSITION: Home or Self Care    FINAL DIAGNOSIS:  Neoplasm of uncertain behavior of skin    FOLLOWUP: In clinic    DISCHARGE INSTRUCTIONS:    Discharge Procedure Orders   Notify your health care provider if you experience any of the following:  temperature >100.4     Notify your health care provider if you experience any of the following:  persistent nausea and vomiting or diarrhea     Notify your health care provider if you experience any of the following:  severe uncontrolled pain     Notify your health care provider if you experience any of the following:  redness, tenderness, or signs of infection (pain, swelling, redness, odor or green/yellow discharge around incision site)     Notify your health care provider if  you experience any of the following:  difficulty breathing or increased cough     Notify your health care provider if you experience any of the following:  severe persistent headache     Notify your health care provider if you experience any of the following:  worsening rash     Notify your health care provider if you experience any of the following:  persistent dizziness, light-headedness, or visual disturbances     Notify your health care provider if you experience any of the following:  increased confusion or weakness     Leave dressing on - Keep it clean, dry, and intact until clinic visit     Weight bearing restrictions (specify):   Order Comments: MODESTO MAGDALENO

## 2025-03-26 ENCOUNTER — OFFICE VISIT (OUTPATIENT)
Dept: URGENT CARE | Facility: CLINIC | Age: 65
End: 2025-03-26
Payer: COMMERCIAL

## 2025-03-26 VITALS
DIASTOLIC BLOOD PRESSURE: 62 MMHG | RESPIRATION RATE: 18 BRPM | OXYGEN SATURATION: 98 % | HEART RATE: 84 BPM | SYSTOLIC BLOOD PRESSURE: 146 MMHG | TEMPERATURE: 98 F

## 2025-03-26 DIAGNOSIS — Z51.89 ENCOUNTER FOR WOUND RE-CHECK: Primary | ICD-10-CM

## 2025-03-26 LAB
ESTROGEN SERPL-MCNC: NORMAL PG/ML
INSULIN SERPL-ACNC: NORMAL U[IU]/ML
LAB AP GROSS DESCRIPTION: NORMAL
LAB AP PERFORMING LOCATION(S): NORMAL
LAB AP REPORT FOOTNOTES: NORMAL

## 2025-03-26 PROCEDURE — 99213 OFFICE O/P EST LOW 20 MIN: CPT | Mod: S$GLB,,, | Performed by: STUDENT IN AN ORGANIZED HEALTH CARE EDUCATION/TRAINING PROGRAM

## 2025-03-26 NOTE — PROGRESS NOTES
Subjective:      Patient ID: Jyoti Blue is a 64 y.o. female.    Vitals:  temperature is 97.8 °F (36.6 °C). Her blood pressure is 146/62 (abnormal) and her pulse is 84. Her respiration is 18 and oxygen saturation is 98%.     Chief Complaint: Wound Check    Patient is a 64-year-old female with a past medical history of hypertension, hyperlipidemia, osteoarthritis, anemia, and skin cancer who presents to clinic for wound check.  Patient had mass of the left thumb and skin cancer removed on March 24, 2025.  Patient states that she was taking a shower.  Patient states that her hand got wet.  Patient states dressing is coming off.  Patient states would like someone to look at this.  Patient states that she was told not to take dressing off herself for 2 weeks.  Patient states that she has not had any bleeding, drainage to her knowledge, pain, swelling, decreased range of motion, paresthesias to including numbness or tingling.  Patient denies any fever or chills, headaches or body aches, dizziness, chest pain or shortness for breath, palpitations, abdominal pain, nausea or vomiting.  Patient states other than needing her wound redressed she is doing well.  Patient states has not followed up with surgeon.        Constitution: Negative. Negative for chills, sweating, fatigue and fever.   HENT: Negative.     Neck: neck negative.   Cardiovascular: Negative.  Negative for chest pain and palpitations.   Eyes: Negative.    Respiratory: Negative.  Negative for chest tightness, cough and shortness of breath.    Gastrointestinal: Negative.  Negative for abdominal pain, nausea, vomiting and diarrhea.   Endocrine: negative.   Genitourinary: Negative.  Negative for dysuria, frequency and urgency.   Musculoskeletal: Negative.    Skin:  Negative for erythema.        Wound check of left hand.  Previously surgical incision to the left thumb.   Allergic/Immunologic: Negative.    Neurological: Negative.  Negative for dizziness,  light-headedness, passing out, headaches, disorientation and altered mental status.   Hematologic/Lymphatic: Negative.    Psychiatric/Behavioral: Negative.  Negative for altered mental status, disorientation and confusion.       Objective:     Physical Exam   Constitutional: She is oriented to person, place, and time. She appears well-developed. She is cooperative.  Non-toxic appearance. She does not appear ill. No distress.   HENT:   Head: Normocephalic and atraumatic.   Ears:   Right Ear: Hearing and external ear normal.   Left Ear: Hearing and external ear normal.   Nose: Nose normal. No mucosal edema or nasal deformity. No epistaxis. Right sinus exhibits no maxillary sinus tenderness and no frontal sinus tenderness. Left sinus exhibits no maxillary sinus tenderness and no frontal sinus tenderness.   Mouth/Throat: Uvula is midline, oropharynx is clear and moist and mucous membranes are normal. Mucous membranes are moist. No trismus in the jaw. Normal dentition. No uvula swelling. Oropharynx is clear.   Eyes: Conjunctivae and lids are normal. Pupils are equal, round, and reactive to light. Right eye exhibits no discharge. Left eye exhibits no discharge. No scleral icterus.   Neck: Trachea normal and phonation normal. Neck supple. No neck rigidity present.   Cardiovascular: Normal rate, regular rhythm, normal heart sounds and normal pulses.   Pulmonary/Chest: Effort normal and breath sounds normal. No respiratory distress. She has no wheezes. She has no rhonchi. She has no rales.   Abdominal: Normal appearance and bowel sounds are normal. She exhibits no distension. Soft.   Musculoskeletal: Normal range of motion.         General: Normal range of motion.      Cervical back: She exhibits no tenderness.        Hands:    Neurological: She is alert and oriented to person, place, and time. She exhibits normal muscle tone.   Skin: Skin is warm, dry, intact, not diaphoretic, not pale and no rash. Capillary refill takes 2  to 3 seconds. No erythema   Psychiatric: Her speech is normal and behavior is normal. Judgment and thought content normal.   Nursing note and vitals reviewed.      Assessment:     1. Encounter for wound re-check        Plan:       Encounter for wound re-check                Upon patient arrival to clinic Ace bandage loosely placed.  Underlying gauze and Kerlix saturated.    Previous visit reviewed.    Due to wound dressing being saturated and loosely placed will change dressing.    Old dressing removed.  Sterile gauze placed over surgical incision which appears healing well.  Sterile gauze roll placed over this followed by Ace bandage placement.  Positive pulse motor and sensory noted pre and post placement.  Patient with no discomfort.    Instructed to follow-up closely with surgeon.    Follow-up with PCP in 1-2 days.    Return to clinic as needed.    To ED for any new or acutely worsening symptoms.    Patient in agreement with plan of care.    DISCLAIMER: Please note that my documentation in this Electronic Healthcare Record was produced using speech recognition software and therefore may contain errors related to that software system.These could include grammar, punctuation and spelling errors or the inclusion/exclusion of phrases that were not intended. Garbled syntax, mangled pronouns, and other bizarre constructions may be attributed to that software system.

## 2025-03-26 NOTE — OP NOTE
[facility] Surgery ([facility])  Surgery Department  Operative Note    SUMMARY     Date of Procedure: 3/24/2025     Procedure: Procedure(s) (LRB):  LEFT THUMB LESION EXCISION REMOVAL (Left)     Surgeons and Role:     * Lucinda William MD - Primary    Assisting Surgeon: None    Pre-Operative Diagnosis: Neoplasm of uncertain behavior of skin [D48.5]  Pain [R52]    Post-Operative Diagnosis: Post-Op Diagnosis Codes:     * Neoplasm of uncertain behavior of skin [D48.5]     * Pain [R52]    Anesthesia: Local MAC    Technical Procedures Used: surgery    Description of the Findings of the Procedure:  Indication for procedure Ms. Blue is a 64-year-old female with an enlarging mass on the left thumb it has been bothersome to the patient after much discussion we elected for surgical intervention risks and benefits were explained to the patient in clinic consents were signed in clinic     Procedure in detail the correct site was marked with the patient's participation in the holding area patient was brought to the operating placed in supine position underwent MAC anesthesia well-padded nonsterile tourniquet was placed on the left upper extremity left upper extremities prepped draped normal sterile fashion a time-out was conducted for the correct procedure to be indicated IV antibiotics were given to the patient preoperatively the arm was elevated tourniquet insufflated 250 mm Hg no Esmarch was utilized the mass looked almost consistent with verrucous vulgaris elliptical incision was made that was an oblique fashion so that would not cause contracture this measured roughly less than 5 mm x 5 mm the lesion was excised in completion with a small amount of normal skin surrounding it it was passed off the back table again it look like a wart and there was none structure deep to the subcutaneous fat it all appeared in the epidermis and dermal layer that was removed the areas irrigated copious amounts normal saline nylon closed  the skin sterile dressing was applied tourniquet was deflated brisk cap refill sued patient was taken to the recovery area in stable condition     Postop plans patient keep the dressing clean dry intact will see the patient back in 2 weeks sutures out patient will be given report of her pathology at that time    Significant Surgical Tasks Conducted by the Assistant(s), if Applicable: retraction    Complications: No    Estimated Blood Loss (EBL): * No values recorded between 3/24/2025  9:12 AM and 3/24/2025  9:42 AM *           Implants: * No implants in log *    Specimens:   Specimen (24h ago, onward)      None                    Condition: Good    Disposition: PACU - hemodynamically stable.    Attestation: I performed the procedure.    Discharge Note    SUMMARY     Admit Date: 3/24/2025    Discharge Date and Time: 3/24/2025 10:29 AM    Hospital Course (synopsis of major diagnoses, care, treatment, and services provided during the course of the hospital stay): surgery     Final Diagnosis: Post-Op Diagnosis Codes:     * Neoplasm of uncertain behavior of skin [D48.5]     * Pain [R52]    Disposition: Home or Self Care    Follow Up/Patient Instructions:     Medications:  Reconciled Home Medications:      Medication List        START taking these medications      traMADoL 50 mg tablet  Commonly known as: ULTRAM  Take 1 tablet (50 mg total) by mouth every 6 (six) hours as needed for Pain.            CONTINUE taking these medications      ascorbic acid (vitamin C) 100 MG tablet  Commonly known as: VITAMIN C  Take 100 mg by mouth once daily. Pt is unsure of dose     aspirin 81 MG EC tablet  Commonly known as: ECOTRIN  Take 1 tablet (81 mg total) by mouth once daily.     atorvastatin 80 MG tablet  Commonly known as: LIPITOR  Take 1 tablet (80 mg total) by mouth once daily.     azelastine 137 mcg (0.1 %) nasal spray  Commonly known as: ASTELIN  1 spray (137 mcg total) by Nasal route 2 (two) times daily.     cetirizine 10 MG  tablet  Commonly known as: ZYRTEC  Take 1 tablet (10 mg total) by mouth once daily.     chlorthalidone 25 MG Tab  Commonly known as: HYGROTEN  Take 1 tablet (25 mg total) by mouth once daily.     cholecalciferol (vitamin D3) 25 mcg (1,000 unit) Chew  Take 5 Int'l Units by mouth once daily. Pt is taking 2000 IU daily     cyanocobalamin 100 MCG tablet  Commonly known as: VITAMIN B-12  Take 100 mcg by mouth once daily. Pt is unsure of dose     cyclobenzaprine 10 MG tablet  Commonly known as: FLEXERIL  Take 1 tablet (10 mg total) by mouth 2 (two) times daily as needed.     diclofenac sodium 1 % Gel  Commonly known as: VOLTAREN  Apply 2 g topically 4 (four) times daily.     diltiaZEM 180 MG Cdcr  Commonly known as: DILT-XR  Take 1 capsule (180 mg total) by mouth once daily.     fluticasone propionate 50 mcg/actuation nasal spray  Commonly known as: FLONASE  1 spray (50 mcg total) by Each Nostril route once daily.     meloxicam 15 MG tablet  Commonly known as: MOBIC  Take 1 tablet (15 mg total) by mouth daily as needed for Pain.     metFORMIN 500 MG ER 24hr tablet  Commonly known as: GLUCOPHAGE-XR  Take 1 tablet (500 mg total) by mouth 2 (two) times daily with meals.     triamcinolone acetonide 0.1% 0.1 % cream  Commonly known as: KENALOG  Apply twice daily to rash 2 weeks on, 1 week off until clear            Discharge Procedure Orders   Notify your health care provider if you experience any of the following:  temperature >100.4     Notify your health care provider if you experience any of the following:  persistent nausea and vomiting or diarrhea     Notify your health care provider if you experience any of the following:  severe uncontrolled pain     Notify your health care provider if you experience any of the following:  redness, tenderness, or signs of infection (pain, swelling, redness, odor or green/yellow discharge around incision site)     Notify your health care provider if you experience any of the following:   difficulty breathing or increased cough     Notify your health care provider if you experience any of the following:  severe persistent headache     Notify your health care provider if you experience any of the following:  worsening rash     Notify your health care provider if you experience any of the following:  persistent dizziness, light-headedness, or visual disturbances     Notify your health care provider if you experience any of the following:  increased confusion or weakness     Leave dressing on - Keep it clean, dry, and intact until clinic visit     Weight bearing restrictions (specify):   Order Comments: MODESTO MAGDALENO      Follow-up Information       John Shaw PA-C Follow up in 2 week(s).    Specialty: Orthopedic Surgery  Why: For wound re-check, For suture removal  Contact information:  [id number]  [address] [address] [phone]

## 2025-03-27 ENCOUNTER — TELEPHONE (OUTPATIENT)
Dept: ORTHOPEDICS | Facility: CLINIC | Age: 65
End: 2025-03-27
Payer: COMMERCIAL

## 2025-03-27 NOTE — TELEPHONE ENCOUNTER
Spoke c pt. Confirmed  XR & appt c Dr. Jean. Advised pt that XR is located on 1st floor of [facility]. Pt expressed understanding & was thankful.

## 2025-03-28 ENCOUNTER — OFFICE VISIT (OUTPATIENT)
Dept: ORTHOPEDICS | Facility: CLINIC | Age: 65
End: 2025-03-28
Payer: COMMERCIAL

## 2025-03-28 DIAGNOSIS — Z98.890 POST-OPERATIVE STATE: Primary | ICD-10-CM

## 2025-03-28 DIAGNOSIS — D48.5 NEOPLASM OF UNCERTAIN BEHAVIOR OF SKIN: ICD-10-CM

## 2025-03-28 PROCEDURE — 99999 PR PBB SHADOW E&M-EST. PATIENT-LVL I: CPT | Mod: PBBFAC,,, | Performed by: SPECIALIST/TECHNOLOGIST

## 2025-03-28 NOTE — PROGRESS NOTES
Patient reports 4 days status post left thumb base mass removal.  She states that the dressing got wet.  She went to urgent cares for a dressing change, and they place the dressing onto tight.  She presents for a dressing change today.  The dressing was removed and no signs of any infection.  A new Kerlix was applied to the left hand wrist and thumb loosely and reinforced with Ace bandage.  Patient we will follow up in 1-1/2 weeks for suture removal.

## 2025-04-08 ENCOUNTER — OFFICE VISIT (OUTPATIENT)
Dept: ORTHOPEDICS | Facility: CLINIC | Age: 65
End: 2025-04-08
Payer: COMMERCIAL

## 2025-04-08 DIAGNOSIS — D48.5 NEOPLASM OF UNCERTAIN BEHAVIOR OF SKIN: ICD-10-CM

## 2025-04-08 DIAGNOSIS — Z98.890 POST-OPERATIVE STATE: Primary | ICD-10-CM

## 2025-04-08 PROCEDURE — 99999 PR PBB SHADOW E&M-EST. PATIENT-LVL III: CPT | Mod: PBBFAC,,, | Performed by: SPECIALIST/TECHNOLOGIST

## 2025-04-08 NOTE — PROGRESS NOTES
"Ms. Blue is here today for a post-operative visit.  She is 14 days status post L Thumb Base Mass Removal by Dr. William on 3/24/25. She reports that she is minimal pain.  Delete She denies fever, chills, and sweats since the time of the surgery.     Physical exam:    Vitals:    04/08/25 0901   PainSc: 0-No pain     Vital signs are stable, patient is afebrile.  Patient is well dressed and well groomed, no acute distress.  Alert and oriented to person, place, and time.  Post op dressing taken down.  Incision is clean, dry and intact.  There is no erythema or exudate.  There is no sign of any infection. She is NVI. Sutures removed without difficulty.      Pathology report  Thumb, Left Hand: mass  MRN # on Epic Label:  [id number]  MRN # on Pathology Label:  [id number]    Received in formalin labeled with the patient's name, MRN and "left thumb mass" is a 1.4 x 0.8 x 0.7 cm excision of pink-tan skin and underlying soft tissue.  The skin surface is remarkable for a 0.5 x 0.4 cm slightly ulcerated lesion.  The excisional margin is marked with black ink.  The specimen is serially sectioned and submitted entirely in 1A.     Grossing was completed by You Rosenthal on 3/25/2025.    Assessment:  s/p left thumb base mass removal        Plan:  Jyoti was seen today for post-op evaluation.    Diagnoses and all orders for this visit:    Post-operative state    Neoplasm of uncertain behavior of skin        PO instruction reviewed and provided to patient  Reviewed pathology report with the patient   Educated patient on scar massage   Educated patient on range-of-motion exercises wrist and hand   Patient we will follow up in clinic p.r.n.    "

## 2025-05-05 DIAGNOSIS — R52 PAIN: Primary | ICD-10-CM

## 2025-05-07 ENCOUNTER — TELEPHONE (OUTPATIENT)
Dept: ORTHOPEDICS | Facility: CLINIC | Age: 65
End: 2025-05-07
Payer: COMMERCIAL

## 2025-05-12 ENCOUNTER — OFFICE VISIT (OUTPATIENT)
Dept: ORTHOPEDICS | Facility: CLINIC | Age: 65
End: 2025-05-12
Payer: COMMERCIAL

## 2025-05-12 ENCOUNTER — HOSPITAL ENCOUNTER (OUTPATIENT)
Dept: RADIOLOGY | Facility: OTHER | Age: 65
Discharge: HOME | End: 2025-05-12
Attending: SPECIALIST/TECHNOLOGIST
Payer: COMMERCIAL

## 2025-05-12 DIAGNOSIS — R22.31 FINGER MASS, RIGHT: Primary | ICD-10-CM

## 2025-05-12 DIAGNOSIS — R52 PAIN: ICD-10-CM

## 2025-05-12 PROCEDURE — 99999 PR PBB SHADOW E&M-EST. PATIENT-LVL III: CPT | Mod: PBBFAC,,, | Performed by: SPECIALIST/TECHNOLOGIST

## 2025-05-12 PROCEDURE — 73130 X-RAY EXAM OF HAND: CPT | Mod: TC,FY,RT

## 2025-05-12 PROCEDURE — 99214 OFFICE O/P EST MOD 30 MIN: CPT | Mod: 57,S$GLB,, | Performed by: SPECIALIST/TECHNOLOGIST

## 2025-05-12 PROCEDURE — 73130 X-RAY EXAM OF HAND: CPT | Mod: 26,RT,, | Performed by: RADIOLOGY

## 2025-05-12 PROCEDURE — 3044F HG A1C LEVEL LT 7.0%: CPT | Mod: CPTII,S$GLB,, | Performed by: SPECIALIST/TECHNOLOGIST

## 2025-05-12 PROCEDURE — 1159F MED LIST DOCD IN RCRD: CPT | Mod: CPTII,S$GLB,, | Performed by: SPECIALIST/TECHNOLOGIST

## 2025-05-12 NOTE — PROGRESS NOTES
Patient ID: Jyoti Blue is a 64 y.o. female.    Chief Complaint: Follow-up of the Right Hand    History of Present Illness    CHIEF COMPLAINT:  - Soft mass on the right thumb IP joint    HPI:  Jyoti presents with a soft swelling on the right thumb IP joint that appeared recently after a previous surgery on the opposite hand. The swelling is not painful but is bothersome cosmetically. She denies any numbness or tingling associated with the swelling. She is concerned about its sudden appearance and is considering surgical removal, primarily due to cosmetic concerns and a desire to confirm its nature.    She had a previous surgery on the left hand for a wart, which healed well. Her medical history is significant for borderline diabetes, managed with metformin. She denies any history of kidney issues or lung issues.    MEDICATIONS:  - Metformin    SURGICAL HISTORY:  - Left hand surgery: For wart removal, healed well      ROS:  ROS as indicated in HPI.          Hand/Wrist Musculoskeletal Exam    Inspection    Right      Erythema: none      Ecchymosis: none      Edema: none      Deformity: none    Left      Erythema: none      Ecchymosis: none      Edema: none      Deformity: none    Inspection additional comments: Firm mobile mass present at the IP joint of the right thumb.    Palpation    Palpation additional comments: Firm mobile mass present at the IP joint of the right thumb    Range of Motion        Range of motion additional comments: Able to make a composite fist.    Neurovascular    Right       Radial pulse: normal      Capillary refill: brisk      Ulnar nerve sensory distribution: normal      Median nerve sensory distribution: normal      Superficial radial nerve sensory distribution: normal    Left       Radial pulse: normal      Capillary refill: brisk      Ulnar nerve sensory distribution: normal      Median nerve sensory distribution: normal      Superficial radial nerve sensory distribution:  normal    Neurovascular additional comments:         Physical Exam    Musculoskeletal: Normal sensation in hands. Normal thumb movement. Normal hand strength. Normal hand range of motion. Presence of cyst on right thumb IP joint.  IMAGING:  - XR Right Hand           IMAGING  Right hand XR  Personal interpretation of the XR reveals no signs of fractures or dislocations      PLAN  Assessment & Plan    Discussed the nature of R Thumb Mass Excision surgery.  Reviewed postoperative care.  Inform patient no lifting pushing or pulling 2 weeks postoperatively.  Patient is to keep the dressing on for 2 weeks' post operatively.  If dressing gets dirty, wet, or irritated, patient will f/u in clinic for a dressing change. She will follow up 2 weeks postoperatively for suture removal.  We have discussed risks of hand surgery which include but are not limited to  blood clots in the legs that can travel to the lungs (pulmonary embolism). Pulmonary embolism can cause shortness of breath, chest pain, and even shock. Other risks include urinary tract infection, nausea and vomiting (usually related to pain medication), chronic pain, nerve damage, blood vessel injury, and infection of the hand which can require re-operation. Furthermore, the risks of anesthesia include potential heart, lung, kidney, and liver damage.  Patient  understands and is ready for the procedure.   Offered prior appointment with conducting surgeon. Patient denied and would like to proceed with surgery as planned.   Consents obtained in clinic.            Candido Shaw PA-C, ATC  Hand and Upper Extremity   [facility]    This note was generated with the assistance of ambient listening technology. Verbal consent was obtained by the patient and accompanying visitor(s) for the recording of patient appointment to facilitate this note. I attest to having reviewed and edited the generated note for accuracy, though some syntax or spelling errors may persist. Please  contact the author of this note for any clarification.

## 2025-05-14 ENCOUNTER — PATIENT MESSAGE (OUTPATIENT)
Dept: ORTHOPEDICS | Facility: CLINIC | Age: 65
End: 2025-05-14
Payer: COMMERCIAL

## 2025-05-16 ENCOUNTER — TELEPHONE (OUTPATIENT)
Dept: ORTHOPEDICS | Facility: CLINIC | Age: 65
End: 2025-05-16
Payer: COMMERCIAL

## 2025-05-16 NOTE — TELEPHONE ENCOUNTER
Provided pt with Medical Records office number    We have received your request for medical records.  Medical records and the release of medical records are  handled by our Release of Information department. They  require a signed release from the patient to release these  records and may require a fee for these records. I have  attached a standard HIPPA release should you need one  and the contact information for our medical records  departments. You are welcome to reach out to them  directly with any questions or concerns.    Phone [phone]  Fax [phone]

## 2025-05-16 NOTE — TELEPHONE ENCOUNTER
ELIAS for pt. Requested a call back to the [facility] at [phone] with any questions, concerns or assistance with her insurance complications.

## (undated) DEVICE — SOL IRR STRL WATER 500ML

## (undated) DEVICE — Device

## (undated) DEVICE — TOURNIQUET SB QC DP 18X4IN

## (undated) DEVICE — BANDAGE MATRIX HK LOOP 2IN 5YD

## (undated) DEVICE — SPONGE COTTON TRAY 4X4IN

## (undated) DEVICE — DRAPE OPHTHALMIC 48X62 FEN

## (undated) DEVICE — PACK OPHTHALMIC

## (undated) DEVICE — PACK EYE CUSTOM COVINGTON.

## (undated) DEVICE — NDL MONOJECT BLACK 22GA 1.5IN

## (undated) DEVICE — DRESSING N ADH OIL EMUL 3X3

## (undated) DEVICE — GLOVE BIOGEL PI MICRO INDIC 7

## (undated) DEVICE — TOWEL OR DISP STRL BLUE 4/PK

## (undated) DEVICE — NDL HYPO STD REG BVL 18GX1.5IN

## (undated) DEVICE — SOL BETADINE 5%

## (undated) DEVICE — SCRUB 10% POVIDONE IODINE 4OZ

## (undated) DEVICE — SOL IRR SOD CHL .9% POUR

## (undated) DEVICE — COVER SURG LIGHT HANDLE

## (undated) DEVICE — SEE L#120831

## (undated) DEVICE — BANDAGE BULKEE II 2.25INX3YD

## (undated) DEVICE — SUT 4/0 18IN ETHILON BL P3

## (undated) DEVICE — FORCEP STRAIGHT DISP

## (undated) DEVICE — CORD FOR BIPOLAR FORCEPS 12

## (undated) DEVICE — BLADE SURG #15 CARBON STEEL

## (undated) DEVICE — GLOVE BIOGEL ECLIPSE SZ 6.5

## (undated) DEVICE — DRAPE STERI-DRAPE 1000 17X11IN

## (undated) DEVICE — SOL BSS BALANCED SALT

## (undated) DEVICE — COVER CAMERA OPERATING ROOM

## (undated) DEVICE — GARTER EYE ADULT

## (undated) DEVICE — GOWN ECLIPSE REINF LVL4 TWL XL

## (undated) DEVICE — SYR B-D DISP CONTROL 10CC100/C

## (undated) DEVICE — GLOVE BIOGEL ECLIPSE SZ 7

## (undated) DEVICE — SPONGE WEC CEL SPEARS

## (undated) DEVICE — CONTAINER SPECIMEN OR STER 4OZ